# Patient Record
Sex: FEMALE | Race: WHITE | NOT HISPANIC OR LATINO | Employment: OTHER | URBAN - METROPOLITAN AREA
[De-identification: names, ages, dates, MRNs, and addresses within clinical notes are randomized per-mention and may not be internally consistent; named-entity substitution may affect disease eponyms.]

---

## 2017-01-23 ENCOUNTER — APPOINTMENT (OUTPATIENT)
Dept: OPTOMETRY | Facility: CLINIC | Age: 52
End: 2017-01-23
Payer: COMMERCIAL

## 2017-01-23 ENCOUNTER — OFFICE VISIT (OUTPATIENT)
Dept: OPTOMETRY | Facility: CLINIC | Age: 52
End: 2017-01-23
Payer: COMMERCIAL

## 2017-01-23 DIAGNOSIS — H40.003 GLAUCOMA SUSPECT, BILATERAL: ICD-10-CM

## 2017-01-23 DIAGNOSIS — H52.03 HYPEROPIA, BILATERAL: ICD-10-CM

## 2017-01-23 DIAGNOSIS — H52.4 PRESBYOPIA: ICD-10-CM

## 2017-01-23 DIAGNOSIS — H52.203 ASTIGMATISM, BILATERAL: Primary | ICD-10-CM

## 2017-01-23 PROCEDURE — 92014 COMPRE OPH EXAM EST PT 1/>: CPT | Performed by: OPTOMETRIST

## 2017-01-23 PROCEDURE — 92015 DETERMINE REFRACTIVE STATE: CPT | Performed by: OPTOMETRIST

## 2017-01-23 PROCEDURE — 92340 FIT SPECTACLES MONOFOCAL: CPT | Performed by: OPTOMETRIST

## 2017-01-23 ASSESSMENT — REFRACTION_WEARINGRX
OS_CYLINDER: +0.50
OS_AXIS: 175
OS_SPHERE: +2.00
OD_SPHERE: +2.00
OS_SPHERE: PLANO
OD_AXIS: 12
OD_SPHERE: -0.25
SPECS_TYPE: EXAM
OS_ADD: +2.00
OD_CYLINDER: +0.75
OD_ADD: +2.00

## 2017-01-23 ASSESSMENT — VISUAL ACUITY
OD_CC: 20/60
OD_SC: 20/30
OD_SC+: +3
OS_CC: 20/60
METHOD: SNELLEN - LINEAR
OS_SC+: -1
OS_SC: 20/30

## 2017-01-23 ASSESSMENT — TONOMETRY
OS_IOP_MMHG: 14
OD_IOP_MMHG: 14
IOP_METHOD: TONOPEN

## 2017-01-23 ASSESSMENT — EXTERNAL EXAM - RIGHT EYE: OD_EXAM: NORMAL

## 2017-01-23 ASSESSMENT — REFRACTION_MANIFEST
OD_SPHERE: PLANO
OS_AXIS: 172
OD_AXIS: 010
OD_CYLINDER: +0.50
OS_ADD: +2.00
OS_CYLINDER: +0.50
OD_ADD: +2.00
OS_SPHERE: PLANO

## 2017-01-23 ASSESSMENT — SLIT LAMP EXAM - LIDS
COMMENTS: NORMAL
COMMENTS: NORMAL

## 2017-01-23 ASSESSMENT — CUP TO DISC RATIO
OS_RATIO: 0.5
OD_RATIO: 0.7

## 2017-01-23 ASSESSMENT — EXTERNAL EXAM - LEFT EYE: OS_EXAM: NORMAL

## 2017-01-23 ASSESSMENT — CONF VISUAL FIELD
OS_NORMAL: 1
OD_NORMAL: 1

## 2017-01-23 NOTE — PROGRESS NOTES
Chief Complaint   Patient presents with     COMPREHENSIVE EYE EXAM      Accompanied by   Last Eye Exam: 1/13/16  Dilated Previously: Yes    What are you currently using to see?  glasses       Distance Vision Acuity: Noticed gradual change in both eyes    Near Vision Acuity: Not satisfied     Eye Comfort: sometimes dry in winter  Do you use eye drops? : No  Occupation or Hobbies:         Medical, surgical and family histories reviewed and updated 1/23/2017.    Paternal gm- glaucoma     OBJECTIVE: See Ophthalmology exam    ASSESSMENT:    ICD-10-CM    1. Astigmatism, bilateral H52.203 REFRACTION   2. Hyperopia, bilateral H52.03 REFRACTION   3. Presbyopia H52.4 REFRACTION   4. Glaucoma suspect, bilateral H40.003 EYE EXAM (SIMPLE-NONBILLABLE)    Last VF and GDX- 2012    PLAN:     Patient Instructions   Recommend new glasses.    Recommend follow up OCT and visual field at [place].    Return in 1 year for a complete eye exam or sooner if needed.    Eric Chu, OD

## 2017-01-24 NOTE — PATIENT INSTRUCTIONS
Recommend new glasses.    Recommend follow up OCT and visual field at [place].    Return in 1 year for a complete eye exam or sooner if needed.    Eric Chu, OD

## 2017-04-05 ENCOUNTER — OFFICE VISIT (OUTPATIENT)
Dept: FAMILY MEDICINE | Facility: CLINIC | Age: 52
End: 2017-04-05
Payer: COMMERCIAL

## 2017-04-05 VITALS
TEMPERATURE: 97.8 F | DIASTOLIC BLOOD PRESSURE: 84 MMHG | OXYGEN SATURATION: 97 % | HEART RATE: 82 BPM | SYSTOLIC BLOOD PRESSURE: 126 MMHG

## 2017-04-05 DIAGNOSIS — R68.84 JAW PAIN: Primary | ICD-10-CM

## 2017-04-05 DIAGNOSIS — M54.2 NECK PAIN: ICD-10-CM

## 2017-04-05 DIAGNOSIS — F32.9 MAJOR DEPRESSIVE DISORDER, SINGLE EPISODE, UNSPECIFIED: ICD-10-CM

## 2017-04-05 LAB
BASOPHILS # BLD AUTO: 0 10E9/L (ref 0–0.2)
BASOPHILS NFR BLD AUTO: 0.3 %
DIFFERENTIAL METHOD BLD: NORMAL
EOSINOPHIL # BLD AUTO: 0.1 10E9/L (ref 0–0.7)
EOSINOPHIL NFR BLD AUTO: 2.3 %
ERYTHROCYTE [DISTWIDTH] IN BLOOD BY AUTOMATED COUNT: 12.4 % (ref 10–15)
ERYTHROCYTE [SEDIMENTATION RATE] IN BLOOD BY WESTERGREN METHOD: 9 MM/H (ref 0–30)
HCT VFR BLD AUTO: 36.2 % (ref 35–47)
HGB BLD-MCNC: 12 G/DL (ref 11.7–15.7)
LYMPHOCYTES # BLD AUTO: 1.6 10E9/L (ref 0.8–5.3)
LYMPHOCYTES NFR BLD AUTO: 40.6 %
MCH RBC QN AUTO: 29.8 PG (ref 26.5–33)
MCHC RBC AUTO-ENTMCNC: 33.1 G/DL (ref 31.5–36.5)
MCV RBC AUTO: 90 FL (ref 78–100)
MONOCYTES # BLD AUTO: 0.4 10E9/L (ref 0–1.3)
MONOCYTES NFR BLD AUTO: 10.3 %
NEUTROPHILS # BLD AUTO: 1.9 10E9/L (ref 1.6–8.3)
NEUTROPHILS NFR BLD AUTO: 46.5 %
PLATELET # BLD AUTO: 242 10E9/L (ref 150–450)
RBC # BLD AUTO: 4.03 10E12/L (ref 3.8–5.2)
WBC # BLD AUTO: 4 10E9/L (ref 4–11)

## 2017-04-05 PROCEDURE — 85025 COMPLETE CBC W/AUTO DIFF WBC: CPT | Performed by: PHYSICIAN ASSISTANT

## 2017-04-05 PROCEDURE — 99214 OFFICE O/P EST MOD 30 MIN: CPT | Performed by: PHYSICIAN ASSISTANT

## 2017-04-05 PROCEDURE — 85652 RBC SED RATE AUTOMATED: CPT | Performed by: PHYSICIAN ASSISTANT

## 2017-04-05 PROCEDURE — 36415 COLL VENOUS BLD VENIPUNCTURE: CPT | Performed by: PHYSICIAN ASSISTANT

## 2017-04-05 ASSESSMENT — PAIN SCALES - GENERAL: PAINLEVEL: MODERATE PAIN (4)

## 2017-04-05 NOTE — NURSING NOTE
"Chief Complaint   Patient presents with     Jaw Pain       Initial /84 (BP Location: Right arm, Patient Position: Chair, Cuff Size: Adult Large)  Pulse 82  Temp 97.8  F (36.6  C) (Oral)  LMP 02/21/2014  SpO2 97%  Breastfeeding? No Estimated body mass index is 30.02 kg/(m^2) as calculated from the following:    Height as of 10/25/16: 1.645 m (5' 4.75\").    Weight as of 10/25/16: 81.2 kg (179 lb).  Medication Reconciliation: complete     Pt declined Height and weight.    KAYLA Ballard MA      "

## 2017-04-05 NOTE — NURSING NOTE
"Chief Complaint   Patient presents with     Jaw Pain       Initial /84 (BP Location: Right arm, Patient Position: Chair, Cuff Size: Adult Large)  Pulse 82  Temp 97.8  F (36.6  C) (Oral)  LMP 02/21/2014  SpO2 97%  Breastfeeding? No Estimated body mass index is 30.02 kg/(m^2) as calculated from the following:    Height as of 10/25/16: 1.645 m (5' 4.75\").    Weight as of 10/25/16: 81.2 kg (179 lb).  Medication Reconciliation: complete     KAYLA Ballard MA      "

## 2017-04-05 NOTE — PROGRESS NOTES
SUBJECTIVE:                                                    Aishwarya Savage is a 52 year old female who presents to clinic today for the following health issues:      Concern - Jaw Pain- Left side     Onset: 2 months    Description:   Feels impacted/cant sleep/ pain shoots into head    Intensity: mild, moderate    Progression of Symptoms:  worsening and intermittent    Accompanying Signs & Symptoms:  no       Previous history of similar problem:   no    Precipitating factors:   Worsened by: no    Alleviating factors:  Improved by: no       Therapies Tried and outcome: none      Jaw pain right side just inferior to lower jaw.  Taking ibuprofen 3-4 times a day without improvement.  Pain for two months.  Pain is constantly there but sometimes shoots up into top of head.  Ears will flush.  At times radiate into neck where she believes salivary gland is and have sharp pain in both ears.  Will have a hot flash into both ears as well. Adamantly reports not tmj.  Does not grind teeth at night   Not sleeping well at night.  Benadryl without improvement.    Wonders about imaging with MRI or antibiotic.   Admits under a lot of stress and emotional.  Had weaned self down to 10 mg prozac but feels needs to increase dose.   Sometimes will take 1/2 of 's hydrocodone to get to sleep and just needs something to help her relax.    Complains that multiple tendons hurt.  Will wake up in pain.  Naproxen without relief.    Reports ambien made too sedated next day and owns  and cannot be sedated next day    Problem list and histories reviewed & adjusted, as indicated.  Additional history: as documented    Patient Active Problem List   Diagnosis     Hypothyroidism     HSV (herpes simplex virus) infection     Depression     Allergic state     CARDIOVASCULAR SCREENING; LDL GOAL LESS THAN 160     Glaucoma suspect     Symptomatic menopausal or female climacteric states     Myalgia     HARLEY (generalized anxiety disorder)      CHEYANNE (obstructive sleep apnea)- mild (AHI 6)     Fibromyalgia     Hyperlipidemia LDL goal <160     Past Surgical History:   Procedure Laterality Date     CL AFF SURGICAL PATHOLOGY  2001     COLONOSCOPY  2/21/2014    Procedure: COLONOSCOPY;  Colonoscopy, screening;  Surgeon: Micheal Webb MD;  Location: [facility] OR     HC EXCISE HAND/FOOT NEUROMA  2009, 2010    Rt Foot     HC TOOTH EXTRACTION W/FORCEP       TUBAL LIGATION  1/91       Social History   Substance Use Topics     Smoking status: Never Smoker     Smokeless tobacco: Never Used     Alcohol use 1.2 oz/week     2 Standard drinks or equivalent per week      Comment: Socially     Family History   Problem Relation Age of Onset     CANCER Maternal Grandmother      lung     CANCER Mother      lung     Blood Disease Mother      nonhodgkins lymphoma     Thyroid Disease Mother      hypothyroid     DIABETES Mother      Type 2     DIABETES Father      2     Hypertension Father      C.A.D. Father      ? MI at 69     Cardiovascular Paternal Grandfather      AAA     Breast Cancer Other      Glaucoma Paternal Grandmother      DIABETES Sister      diabetic     CEREBROVASCULAR DISEASE Maternal Aunt      Macular Degeneration No family hx of          Current Outpatient Prescriptions   Medication Sig Dispense Refill       1     FLUoxetine (PROZAC) 10 MG capsule Take 3 capsules (30 mg) by mouth daily Take with 20 mg tablet for a total of 30 mg a day 90 capsule 1     Multiple Vitamins-Minerals (MULTIVITAMIN ADULT PO) Take 1 tablet by mouth daily       Calcium Carbonate-Vitamin D (CALCIUM-D PO) Take 1 tablet by mouth 2 times daily       levothyroxine (SYNTHROID, LEVOTHROID) 150 MCG tablet Take 1 tablet (150 mcg) by mouth daily 90 tablet 3     triamcinolone (KENALOG) 0.1 % ointment Apply sparingly to affected area bid for 14 days. 15 g 0     albuterol (PROAIR HFA, PROVENTIL HFA, VENTOLIN HFA) 108 (90 BASE) MCG/ACT inhaler Inhale 2 puffs into the lungs every 6 hours as needed for  shortness of breath / dyspnea or wheezing 1 Inhaler 3     zolpidem (AMBIEN) 5 MG tablet Take tablet by mouth 15 minutes prior to sleep, for Sleep Study 1 tablet 0     nystatin POWD Apply 1 Bottle topically as needed Apply topically, qd-bid, prn 1 each 3     acyclovir (ZOVIRAX) 5 % ointment Apply topically 6 times daily 15 g 1     OMEPRAZOLE PO        fluticasone (FLONASE) 50 MCG/ACT nasal spray Spray 1-2 sprays into both nostrils daily 1 Package 6     ZYRTEC 10 MG OR TABS 1 TABLET DAILY       IBUPROFEN 200 MG OR TABS 1 TABLET EVERY 4 TO 6 HOURS AS NEEDED         Reviewed and updated as needed this visit by clinical staff  Tobacco  Allergies  Meds  Med Hx  Surg Hx  Fam Hx  Soc Hx      Reviewed and updated as needed this visit by Provider         ROS:  Constitutional, HEENT, cardiovascular, pulmonary, gi and gu systems are negative, except as otherwise noted.    OBJECTIVE:                                                    /84 (BP Location: Right arm, Patient Position: Chair, Cuff Size: Adult Large)  Pulse 82  Temp 97.8  F (36.6  C) (Oral)  LMP 02/21/2014  SpO2 97%  Breastfeeding? No  There is no height or weight on file to calculate BMI.  GENERAL: healthy, alert and no distress  EYES: Eyes grossly normal to inspection, PERRL and conjunctivae and sclerae normal  HENT: ear canals and TM's normal, nose and mouth without ulcers or lesions  NECK: no adenopathy, no asymmetry, masses, or scars and thyroid normal to palpation, no carotid bruits   RESP: lungs clear to auscultation - no rales, rhonchi or wheezes  CV: regular rate and rhythm, normal S1 S2, no S3 or S4, no murmur, click or rub, no peripheral edema and peripheral pulses strong  MS: no gross musculoskeletal defects noted, no edema  NEURO: Normal strength and tone, sensory exam grossly normal, mentation intact, cranial nerves 2-12 intact, DTR's normal and symmetric bilaterally , gait normal including heel/toe/tandem walking and rapid alternating  movements normal  PSYCH: mentation appears normal, tearful, anxious, judgement and insight intact and appearance well groomed    Diagnostic Test Results:  Results for orders placed or performed in visit on 04/05/17   CBC with platelets differential   Result Value Ref Range    WBC 4.0 4.0 - 11.0 10e9/L    RBC Count 4.03 3.8 - 5.2 10e12/L    Hemoglobin 12.0 11.7 - 15.7 g/dL    Hematocrit 36.2 35.0 - 47.0 %    MCV 90 78 - 100 fl    MCH 29.8 26.5 - 33.0 pg    MCHC 33.1 31.5 - 36.5 g/dL    RDW 12.4 10.0 - 15.0 %    Platelet Count 242 150 - 450 10e9/L    Diff Method Automated Method     % Neutrophils 46.5 %    % Lymphocytes 40.6 %    % Monocytes 10.3 %    % Eosinophils 2.3 %    % Basophils 0.3 %    Absolute Neutrophil 1.9 1.6 - 8.3 10e9/L    Absolute Lymphocytes 1.6 0.8 - 5.3 10e9/L    Absolute Monocytes 0.4 0.0 - 1.3 10e9/L    Absolute Eosinophils 0.1 0.0 - 0.7 10e9/L    Absolute Basophils 0.0 0.0 - 0.2 10e9/L   ESR: Erythrocyte sedimentation rate   Result Value Ref Range    Sed Rate 9 0 - 30 mm/h        ASSESSMENT/PLAN:                                                            1. Jaw pain  Will image with CT although I do not find any abnormalities on physical exam  Normal cbc and sed rated and advised I did not feel antibiotic warranted  - CBC with platelets differential  - ESR: Erythrocyte sedimentation rate  - CT Soft Tissue Neck w Contrast; Future    2. Neck pain    - CT Soft Tissue Neck w Contrast; Future    3. Major depressive disorder, single episode, unspecified  Long discussion regarding medications to sleep and inappropriate use of medications and medications that I would feel comfortable using for insomnia and in end elected to not treat at this time.  Patient would like to increase prozac from 10 mg daily to 20 mg daily  Recommended follow up with PCP to discuss sleep especially if not improved with increasing prozac (had been on 30 mg but weaned self to 10 mg)   - FLUoxetine (PROZAC) 20 MG capsule; Take 1  capsule (20 mg) by mouth daily  Dispense: 30 capsule; Refill: 1    30 min spent in direct face to face time with this pt, greater than 50% in counseling and coordination of care.      CC chart to PCP for uriel Kay PA-C  [facility]

## 2017-04-05 NOTE — MR AVS SNAPSHOT
After Visit Summary   4/5/2017    Aishwarya Savage    MRN: [id number]           Patient Information     Date Of Birth          1965        Visit Information        Provider Department      4/5/2017 6:20 PM Margo Kay PA-C [facility]        Today's Diagnoses     Jaw pain    -  1    Neck pain        Major depressive disorder, single episode, unspecified           Follow-ups after your visit        Who to contact     If you have questions or need follow up information about today's clinic visit or your schedule please contact [facility] directly at [phone].  Normal or non-critical lab and imaging results will be communicated to you by Acumaticahart, letter or phone within 4 business days after the clinic has received the results. If you do not hear from us within 7 days, please contact the clinic through Acumaticahart or phone. If you have a critical or abnormal lab result, we will notify you by phone as soon as possible.  Submit refill requests through [employer]Rider or call your pharmacy and they will forward the refill request to us. Please allow 3 business days for your refill to be completed.          Additional Information About Your Visit        MyChart Information     [employer] gives you secure access to your electronic health record. If you see a primary care provider, you can also send messages to your care team and make appointments. If you have questions, please call your primary care clinic.  If you do not have a primary care provider, please call [phone] and they will assist you.        Care EveryWhere ID     This is your Care EveryWhere ID. This could be used by other organizations to access your [facility] medical records  [id number]        Your Vitals Were     Pulse Temperature Last Period Pulse Oximetry Breastfeeding?       82 97.8  F (36.6  C) (Oral) 02/21/2014 97% No        Blood Pressure from Last 3 Encounters:   04/05/17 126/84   10/25/16 138/74    09/08/16 121/76    Weight from Last 3 Encounters:   10/25/16 81.2 kg (179 lb)   09/08/16 80.7 kg (178 lb)   05/05/15 84.6 kg (186 lb 9.6 oz)              We Performed the Following     CBC with platelets differential     ESR: Erythrocyte sedimentation rate          Today's Medication Changes          These changes are accurate as of: 4/5/17 11:59 PM.  If you have any questions, ask your nurse or doctor.               These medicines have changed or have updated prescriptions.        Dose/Directions    FLUoxetine 20 MG capsule   Commonly known as:  PROzac   This may have changed:    - medication strength  - how much to take  - additional instructions   Used for:  Major depressive disorder, single episode, unspecified   Changed by:  Margo Kay PA-C        Dose:  20 mg   Take 1 capsule (20 mg) by mouth daily   Quantity:  30 capsule   Refills:  1            Where to get your medicines      These medications were sent to [place] [address] [facility] [place] - [address]  [address] [facility] [address]     Phone:  [phone]     FLUoxetine 20 MG capsule                Primary Care Provider Office Phone # Fax #    Makeda Gabrielle Bryan [phone]       [facility] [address] [facility] [address]        Thank you!     Thank you for choosing [facility]  for your care. Our goal is always to provide you with excellent care. Hearing back from our patients is one way we can continue to improve our services. Please take a few minutes to complete the written survey that you may receive in the mail after your visit with us. Thank you!             Your Updated Medication List - Protect others around you: Learn how to safely use, store and throw away your medicines at www.disposemymeds.org.          This list is accurate as of: 4/5/17 11:59 PM.  Always use your most recent med list.                   Brand Name Dispense Instructions for use    acyclovir  5 % ointment    ZOVIRAX    15 g    Apply topically 6 times daily       albuterol 108 (90 BASE) MCG/ACT Inhaler    PROAIR HFA/PROVENTIL HFA/VENTOLIN HFA    1 Inhaler    Inhale 2 puffs into the lungs every 6 hours as needed for shortness of breath / dyspnea or wheezing       CALCIUM-D PO      Take 1 tablet by mouth 2 times daily       FLUoxetine 20 MG capsule    PROzac    30 capsule    Take 1 capsule (20 mg) by mouth daily       fluticasone 50 MCG/ACT spray    FLONASE    1 Package    Spray 1-2 sprays into both nostrils daily       ibuprofen 200 MG tablet    ADVIL/MOTRIN     1 TABLET EVERY 4 TO 6 HOURS AS NEEDED       levothyroxine 150 MCG tablet    SYNTHROID/LEVOTHROID    90 tablet    Take 1 tablet (150 mcg) by mouth daily       MULTIVITAMIN ADULT PO      Take 1 tablet by mouth daily       nystatin Powd     1 each    Apply 1 Bottle topically as needed Apply topically, qd-bid, prn       OMEPRAZOLE PO          triamcinolone 0.1 % ointment    KENALOG    15 g    Apply sparingly to affected area bid for 14 days.       ZYRTEC 10 MG tablet   Generic drug:  cetirizine      1 TABLET DAILY

## 2017-04-26 ENCOUNTER — RADIANT APPOINTMENT (OUTPATIENT)
Dept: CT IMAGING | Facility: CLINIC | Age: 52
End: 2017-04-26
Attending: PHYSICIAN ASSISTANT
Payer: COMMERCIAL

## 2017-04-26 DIAGNOSIS — R68.84 JAW PAIN: ICD-10-CM

## 2017-04-26 DIAGNOSIS — M54.2 NECK PAIN: ICD-10-CM

## 2017-04-26 PROCEDURE — 70491 CT SOFT TISSUE NECK W/DYE: CPT | Performed by: RADIOLOGY

## 2017-04-26 RX ORDER — IOPAMIDOL 755 MG/ML
100 INJECTION, SOLUTION INTRAVASCULAR ONCE
Status: COMPLETED | OUTPATIENT
Start: 2017-04-26 | End: 2017-04-26

## 2017-04-26 RX ADMIN — IOPAMIDOL 100 ML: 755 INJECTION, SOLUTION INTRAVASCULAR at 16:10

## 2017-04-27 NOTE — PROGRESS NOTES
uriel Neff  Your CT showed some arthritic changes of the TMJ joint but nothing concerning.  If still symptomatic please follow up with your PCP . Please call or MyChart my office with any questions or concerns.    Margo Kay, PAC

## 2017-05-24 DIAGNOSIS — E03.9 HYPOTHYROIDISM, UNSPECIFIED TYPE: ICD-10-CM

## 2017-05-25 RX ORDER — LEVOTHYROXINE SODIUM 150 UG/1
TABLET ORAL
Qty: 90 TABLET | Refills: 2 | OUTPATIENT
Start: 2017-05-25

## 2017-05-25 NOTE — TELEPHONE ENCOUNTER
Medication is being denied due to: RX was sent 10/25/2016 for #90 with 3 refills.  No additional refills should be needed at this time.   Monet Cordova RN

## 2017-11-28 ENCOUNTER — OFFICE VISIT (OUTPATIENT)
Dept: FAMILY MEDICINE | Facility: CLINIC | Age: 52
End: 2017-11-28
Payer: COMMERCIAL

## 2017-11-28 VITALS
BODY MASS INDEX: 30.73 KG/M2 | HEART RATE: 74 BPM | SYSTOLIC BLOOD PRESSURE: 132 MMHG | WEIGHT: 180 LBS | OXYGEN SATURATION: 99 % | HEIGHT: 64 IN | DIASTOLIC BLOOD PRESSURE: 74 MMHG | TEMPERATURE: 97.7 F

## 2017-11-28 DIAGNOSIS — Z00.00 NORMAL PHYSICAL EXAM, ROUTINE: Primary | ICD-10-CM

## 2017-11-28 DIAGNOSIS — Z23 NEED FOR PROPHYLACTIC VACCINATION AND INOCULATION AGAINST INFLUENZA: ICD-10-CM

## 2017-11-28 DIAGNOSIS — F43.23 SITUATIONAL MIXED ANXIETY AND DEPRESSIVE DISORDER: ICD-10-CM

## 2017-11-28 DIAGNOSIS — R05.3 COUGH, PERSISTENT: ICD-10-CM

## 2017-11-28 DIAGNOSIS — F41.1 GAD (GENERALIZED ANXIETY DISORDER): Chronic | ICD-10-CM

## 2017-11-28 DIAGNOSIS — M79.7 FIBROMYALGIA: ICD-10-CM

## 2017-11-28 DIAGNOSIS — E03.9 HYPOTHYROIDISM, UNSPECIFIED TYPE: ICD-10-CM

## 2017-11-28 DIAGNOSIS — Z12.31 VISIT FOR SCREENING MAMMOGRAM: ICD-10-CM

## 2017-11-28 LAB — TSH SERPL DL<=0.005 MIU/L-ACNC: 0.43 MU/L (ref 0.4–4)

## 2017-11-28 PROCEDURE — 90471 IMMUNIZATION ADMIN: CPT | Performed by: FAMILY MEDICINE

## 2017-11-28 PROCEDURE — 84443 ASSAY THYROID STIM HORMONE: CPT | Performed by: FAMILY MEDICINE

## 2017-11-28 PROCEDURE — 90686 IIV4 VACC NO PRSV 0.5 ML IM: CPT | Performed by: FAMILY MEDICINE

## 2017-11-28 PROCEDURE — 36415 COLL VENOUS BLD VENIPUNCTURE: CPT | Performed by: FAMILY MEDICINE

## 2017-11-28 PROCEDURE — 99396 PREV VISIT EST AGE 40-64: CPT | Mod: 25 | Performed by: FAMILY MEDICINE

## 2017-11-28 RX ORDER — ALBUTEROL SULFATE 90 UG/1
2 AEROSOL, METERED RESPIRATORY (INHALATION) EVERY 6 HOURS PRN
Qty: 1 INHALER | Refills: 3 | Status: SHIPPED | OUTPATIENT
Start: 2017-11-28 | End: 2018-10-22

## 2017-11-28 RX ORDER — LEVOTHYROXINE SODIUM 150 UG/1
150 TABLET ORAL DAILY
Qty: 90 TABLET | Refills: 3 | Status: SHIPPED | OUTPATIENT
Start: 2017-11-28 | End: 2018-11-21

## 2017-11-28 ASSESSMENT — PAIN SCALES - GENERAL: PAINLEVEL: NO PAIN (1)

## 2017-11-28 NOTE — LETTER
My Depression Action Plan  Name: Aishwarya Savage   Date of Birth 1965  Date: 11/28/2017    My doctor: Makeda Bryan   My clinic: [address] [id number] [phone]          GREEN    ZONE   Good Control    What it looks like:     Things are going generally well. You have normal up s and down s. You may even feel depressed from time to time, but bad moods usually last less than a day.   What you need to do:  1. Continue to care for yourself (see self care plan)  2. Check your depression survival kit and update it as needed  3. Follow your physician s recommendations including any medication.  4. Do not stop taking medication unless you consult with your physician first.           YELLOW         ZONE Getting Worse    What it looks like:     Depression is starting to interfere with your life.     It may be hard to get out of bed; you may be starting to isolate yourself from others.    Symptoms of depression are starting to last most all day and this has happened for several days.     You may have suicidal thoughts but they are not constant.   What you need to do:     1. Call your care team, your response to treatment will improve if you keep your care team informed of your progress. Yellow periods are signs an adjustment may need to be made.     2. Continue your self-care, even if you have to fake it!    3. Talk to someone in your support network    4. Open up your depression survival kit           RED    ZONE Medical Alert - Get Help    What it looks like:     Depression is seriously interfering with your life.     You may experience these or other symptoms: You can t get out of bed most days, can t work or engage in other necessary activities, you have trouble taking care of basic hygiene, or basic responsibilities, thoughts of suicide or death that will not go away, self-injurious behavior.     What you need to do:  1. Call your  care team and request a same-day appointment. If they are not available (weekends or after hours) call your local crisis line, emergency room or 911.      Electronically signed by: Makeda Bryan, November 28, 2017    Depression Self Care Plan / Survival Kit    Self-Care for Depression  Here s the deal. Your body and mind are really not as separate as most people think.  What you do and think affects how you feel and how you feel influences what you do and think. This means if you do things that people who feel good do, it will help you feel better.  Sometimes this is all it takes.  There is also a place for medication and therapy depending on how severe your depression is, so be sure to consult with your medical provider and/ or Behavioral Health Consultant if your symptoms are worsening or not improving.     In order to better manage my stress, I will:    Exercise  Get some form of exercise, every day. This will help reduce pain and release endorphins, the  feel good  chemicals in your brain. This is almost as good as taking antidepressants!  This is not the same as joining a gym and then never going! (they count on that by the way ) It can be as simple as just going for a walk or doing some gardening, anything that will get you moving.      Hygiene   Maintain good hygiene (Get out of bed in the morning, Make your bed, Brush your teeth, Take a shower, and Get dressed like you were going to work, even if you are unemployed).  If your clothes don't fit try to get ones that do.    Diet  I will strive to eat foods that are good for me, drink plenty of water, and avoid excessive sugar, caffeine, alcohol, and other mood-altering substances.  Some foods that are helpful in depression are: complex carbohydrates, B vitamins, flaxseed, fish or fish oil, fresh fruits and vegetables.    Psychotherapy  I agree to participate in Individual Therapy (if recommended).    Medication  If prescribed medications, I agree to take  them.  Missing doses can result in serious side effects.  I understand that drinking alcohol, or other illicit drug use, may cause potential side effects.  I will not stop my medication abruptly without first discussing it with my provider.    Staying Connected With Others  I will stay in touch with my friends, family members, and my primary care provider/team.    Use your imagination  Be creative.  We all have a creative side; it doesn t matter if it s oil painting, sand castles, or mud pies! This will also kick up the endorphins.    Witness Beauty  (AKA stop and smell the roses) Take a look outside, even in mid-winter. Notice colors, textures. Watch the squirrels and birds.     Service to others  Be of service to others.  There is always someone else in need.  By helping others we can  get out of ourselves  and remember the really important things.  This also provides opportunities for practicing all the other parts of the program.    Humor  Laugh and be silly!  Adjust your TV habits for less news and crime-drama and more comedy.    Control your stress  Try breathing deep, massage therapy, biofeedback, and meditation. Find time to relax each day.     My support system    Clinic Contact:  Phone number:    Contact 1:  Phone number:    Contact 2:  Phone number:    Mormonism/:  Phone number:    Therapist:  Phone number:    Local crisis center:    Phone number:    Other community support:  Phone number:

## 2017-11-28 NOTE — NURSING NOTE
"Chief Complaint   Patient presents with     Physical       Initial /74 (BP Location: Right arm, Patient Position: Chair, Cuff Size: Adult Regular)  Pulse 74  Temp 97.7  F (36.5  C) (Oral)  Ht 5' 4.25\" (1.632 m)  Wt 180 lb (81.6 kg)  LMP 02/21/2014  SpO2 99%  Breastfeeding? No  BMI 30.66 kg/m2 Estimated body mass index is 30.66 kg/(m^2) as calculated from the following:    Height as of this encounter: 5' 4.25\" (1.632 m).    Weight as of this encounter: 180 lb (81.6 kg).  Medication Reconciliation: complete     Gordo Brink CMA    "

## 2017-11-28 NOTE — MR AVS SNAPSHOT
After Visit Summary   11/28/2017    Aishwarya Savage    MRN: [id number]           Patient Information     Date Of Birth          1965        Visit Information        Provider Department      11/28/2017 5:40 PM Makeda Bryan MD [facility]        Today's Diagnoses     Hypothyroidism, unspecified type    -  1    HARLEY (generalized anxiety disorder)        Visit for screening mammogram        Need for prophylactic vaccination and inoculation against influenza        Fibromyalgia        Situational mixed anxiety and depressive disorder          Care Instructions    At [facility], we strive to deliver an exceptional experience to you, every time we see you.  If you receive a survey in the mail, please send us back your thoughts. We really do value your feedback.    Based on your medical history, these are the current health maintenance/preventive care services that you are due for (some may have been done at this visit.)  Health Maintenance Due   Topic Date Due     PHQ-9 Q6 MONTHS  04/10/2017     INFLUENZA VACCINE (SYSTEM ASSIGNED)  09/01/2017     TSH Q1 YEAR  10/25/2017     DEPRESSION ACTION PLAN Q1 YR  10/25/2017     MAMMO SCREEN Q2 YR (SYSTEM ASSIGNED)  11/04/2017         Suggested websites for health information:  Www.[employer] : Up to date and easily searchable information on multiple topics.  Www.medlineplus.gov : medication info, interactive tutorials, watch real surgeries online  Www.familydoctor.org : good info from the Academy of Family Physicians  Www.cdc.gov : public health info, travel advisories, epidemics (H1N1)  Www.aap.org : children's health info, normal development, vaccinations  Www.health.state.[place].us : [place] dept of health, public health issues in [place], N1N1    Your care team:                            Family Medicine Internal Medicine   MD Toby Short MD Shantel Branch-Fleming, MD Katya Georgiev PA-C Nam Ho, MD  Pediatrics   JUAN Gonzalez CNP Amelia Massimini APRMD Coreen Rey CNP, MD Deborah Mielke, MD Kim Thein, APRN CNP      Clinic hours: Monday - Thursday 7 am-7 pm; Fridays 7 am-5 pm.   Urgent care: Monday - Friday 11 am-9 pm; Saturday and Sunday 9 am-5 pm.  Pharmacy : Monday -Thursday 8 am-8 pm; Friday 8 am-6 pm; Saturday and Sunday 9 am-5 pm.     Clinic: [phone]   Pharmacy: [phone]    Preventive Health Recommendations  Female Ages 50 - 64    Yearly exam: See your health care provider every year in order to  o Review health changes.   o Discuss preventive care.    o Review your medicines if your doctor has prescribed any.      Get a Pap test every three years (unless you have an abnormal result and your provider advises testing more often).    If you get Pap tests with HPV test, you only need to test every 5 years, unless you have an abnormal result.     You do not need a Pap test if your uterus was removed (hysterectomy) and you have not had cancer.    You should be tested each year for STDs (sexually transmitted diseases) if you're at risk.     Have a mammogram every 1 to 2 years.    Have a colonoscopy at age 50, or have a yearly FIT test (stool test). These exams screen for colon cancer.      Have a cholesterol test every 5 years, or more often if advised.    Have a diabetes test (fasting glucose) every three years. If you are at risk for diabetes, you should have this test more often.     If you are at risk for osteoporosis (brittle bone disease), think about having a bone density scan (DEXA).    Shots: Get a flu shot each year. Get a tetanus shot every 10 years.    Nutrition:     Eat at least 5 servings of fruits and vegetables each day.    Eat whole-grain bread, whole-wheat pasta and brown rice instead of white grains and rice.    Talk to your provider about Calcium and Vitamin D.     Lifestyle    Exercise at least 150 minutes a week (30 minutes a  day, 5 days a week). This will help you control your weight and prevent disease.    Limit alcohol to one drink per day.    No smoking.     Wear sunscreen to prevent skin cancer.     See your dentist every six months for an exam and cleaning.    See your eye doctor every 1 to 2 years.    Anxiety Reaction  Anxiety is the feeling we all get when we think something bad might happen. It is a normal response to stress and usually causes only a mild reaction. When anxiety becomes more severe, it can interfere with daily life. In some cases, you may not even be aware of what it is you re anxious about. There may also be a genetic link or it may be a learned behavior in the home.  Both psychological and physical triggers cause stress reaction. It's often a response to fear or emotional stress, real or imagined. This stress may come from home, family, work, or social relationships.  During an anxiety reaction, you may feel:    Helpless    Nervous    Depressed    Irritable  Your body may show signs of anxiety in many ways. You may experience:    Dry mouth    Shakiness    Dizziness    Weakness    Trouble breathing    Breathing fast (hyperventilating)    Chest pressure    Sweating    Headache    Nausea    Diarrhea    Tiredness    Inability to sleep    Sexual problems  Home care    Try to locate the sources of stress in your life. They may not be obvious. These may include:    Daily hassles of life (traffic jams, missed appointments, car troubles, etc.)    Major life changes, both good (new baby, job promotion) and bad (loss of job, loss of loved one)    Overload: feeling that you have too many responsibilities and can't take care of all of them at once    Feeling helpless, feeling that your problems are beyond what you re able to solve    Notice how your body reacts to stress. Learn to listen to your body signals. This will help you take action before the stress becomes severe.    When you can, do something about the source of  your stress. (Avoid hassles, limit the amount of change that happens in your life at one time and take a break when you feel overloaded).    Unfortunately, many stressful situations can't be avoided. It is necessary to learn how to better manage stress. There are many proven methods that will reduce your anxiety. These include simple things like exercise, good nutrition and adequate rest. Also, there are certain techniques that are helpful:    Relaxation    Breathing exercises    Visualization    Biofeedback    Meditation  For more information about this, consult your doctor or go to a local bookstore and review the many books and tapes available on this subject.  Follow-up care  If you feel that your anxiety is not responding to self-help measures, contact your doctor or make an appointment with a counselor. You may need short-term psychological counseling and temporary medicine to help you manage stress.  Call 911  Call your healthcare provider right away if any of these occur:    Trouble breathing    Confusion    Drowsiness or trouble wakening    Fainting or loss of consciousness    Rapid heart rate    Seizure    New chest pain that becomes more severe, lasts longer, or spreads into your shoulder, arm, neck, jaw, or back  When to seek medical advice  Call your healthcare provider right away if any of these occur:    Your symptoms get worse    Severe headache not relieved by rest and mild pain reliever  Date Last Reviewed: 9/29/2015 2000-2017 [employer]. [address] [place] [address], [place] [address]. All rights reserved. This information is not intended as a substitute for professional medical care. Always follow your healthcare professional's instructions.                Follow-ups after your visit        Additional Services     MENTAL HEALTH REFERRAL  - Adult; Outpatient Treatment; Individual/Couples/Family/Group Therapy/Health Psychology; [facility] [phone]; The  scheduling team will contact you to schedule your appointment.  If you have any ...       All scheduling is subject to the client's specific insurance plan & benefits, provider/location availability, and provider clinical specialities.  Please arrive 15 minutes early for your first appointment and bring your completed paperwork.    Please be aware that coverage of these services is subject to the terms and limitations of your health insurance plan.  Call member services at your health plan with any benefit or coverage questions.                            Future tests that were ordered for you today     Open Future Orders        Priority Expected Expires Ordered    MA SCREENING DIGITAL BILAT - Future  (s+30) Routine  11/28/2018 11/28/2017            Who to contact     If you have questions or need follow up information about today's clinic visit or your schedule please contact [facility] directly at [phone].  Normal or non-critical lab and imaging results will be communicated to you by CoTweethart, letter or phone within 4 business days after the clinic has received the results. If you do not hear from us within 7 days, please contact the clinic through CoTwe[employer]art or phone. If you have a critical or abnormal lab result, we will notify you by phone as soon as possible.  Submit refill requests through [employer]ter or call your pharmacy and they will forward the refill request to us. Please allow 3 business days for your refill to be completed.          Additional Information About Your Visit        D[employer]ter Information     [employer] gives you secure access to your electronic health record. If you see a primary care provider, you can also send messages to your care team and make appointments. If you have questions, please call your primary care clinic.  If you do not have a primary care provider, please call [phone] and they will assist you.        Care EveryWhere ID     This is your Care EveryWhere ID.  "This could be used by other organizations to access your [place] medical records  [id number]        Your Vitals Were     Pulse Temperature Height Last Period Pulse Oximetry Breastfeeding?    74 97.7  F (36.5  C) (Oral) 5' 4.25\" (1.632 m) 02/21/2014 99% No    BMI (Body Mass Index)                   30.66 kg/m2            Blood Pressure from Last 3 Encounters:   11/28/17 132/74   04/05/17 126/84   10/25/16 138/74    Weight from Last 3 Encounters:   11/28/17 180 lb (81.6 kg)   10/25/16 179 lb (81.2 kg)   09/08/16 178 lb (80.7 kg)              We Performed the Following          ADMIN VACCINE, FIRST [12392]     HC FLU VAC PRESRV FREE QUAD SPLIT VIR 3+YRS IM  [21288]     MENTAL HEALTH REFERRAL  - Adult; Outpatient Treatment; Individual/Couples/Family/Group Therapy/Health Psychology; [facility] [phone]; The scheduling team will contact you to schedule your appointment.  If you have any ...     TSH WITH FREE T4 REFLEX          Today's Medication Changes          These changes are accurate as of: 11/28/17  6:19 PM.  If you have any questions, ask your nurse or doctor.               Stop taking these medicines if you haven't already. Please contact your care team if you have questions.     acyclovir 5 % ointment   Commonly known as:  ZOVIRAX   Stopped by:  Makeda Bryan MD           fluticasone 50 MCG/ACT spray   Commonly known as:  FLONASE   Stopped by:  Makeda Bryan MD           nystatin Powd   Stopped by:  Makeda Bryan MD                Where to get your medicines      These medications were sent to [place] [address] [facility] [place] [place] - [address]  [address] [address]     Phone:  [phone]     FLUoxetine 20 MG capsule    levothyroxine 150 MCG tablet                Primary Care Provider Office Phone # Fax #    Makeda Bryan [phone]       [address]  [place] [address]        Equal Access to Services     " [facility] : Hadii aad ku ritika Mera, waaxda luqadaha, qaybta kaalmada adeezio, neeta sandie juanann alvarengaharveydurga vela . So [facility] [phone].    ATENCIÓN: Si habla español, tiene a benton disposición servicios gratuitos de asistencia lingüística. Llame al [phone].    We comply with applicable federal civil rights laws and [place] laws. We do not discriminate on the basis of race, color, national origin, age, disability, sex, sexual orientation, or gender identity.            Thank you!     Thank you for choosing [facility]  for your care. Our goal is always to provide you with excellent care. Hearing back from our patients is one way we can continue to improve our services. Please take a few minutes to complete the written survey that you may receive in the mail after your visit with us. Thank you!             Your Updated Medication List - Protect others around you: Learn how to safely use, store and throw away your medicines at www.disposemymeds.org.          This list is accurate as of: 11/28/17  6:19 PM.  Always use your most recent med list.                   Brand Name Dispense Instructions for use Diagnosis    albuterol 108 (90 BASE) MCG/ACT Inhaler    PROAIR HFA/PROVENTIL HFA/VENTOLIN HFA    1 Inhaler    Inhale 2 puffs into the lungs every 6 hours as needed for shortness of breath / dyspnea or wheezing    Cough, persistent       FLUoxetine 20 MG capsule    PROzac    90 capsule    Take 1 capsule (20 mg) by mouth daily    HARLEY (generalized anxiety disorder)       ibuprofen 200 MG tablet    ADVIL/MOTRIN     1 TABLET EVERY 4 TO 6 HOURS AS NEEDED        levothyroxine 150 MCG tablet    SYNTHROID/LEVOTHROID    90 tablet    Take 1 tablet (150 mcg) by mouth daily    Hypothyroidism, unspecified type       MULTIVITAMIN ADULT PO      Take 1 tablet by mouth daily        ZYRTEC 10 MG tablet   Generic drug:  cetirizine      1 TABLET DAILY

## 2017-11-28 NOTE — PROGRESS NOTES
SUBJECTIVE:   CC: Aishwarya Savage is an 52 year old woman who presents for preventive health visit.     Physical   Annual:     Getting at least 3 servings of Calcium per day::  Yes    Bi-annual eye exam::  Yes    Dental care twice a year::  Yes    Sleep apnea or symptoms of sleep apnea::  None (Hx sleep study negative)    Diet::  Regular (no restrictions)    Frequency of exercise::  None    Taking medications regularly::  Yes    Additional concerns today::  YES (trouble falling alseep and cold/strep throat exosure with feeling sick x4days)          Depression and Anxiety Follow-Up    Status since last visit: No change    Other associated symptoms:None    Complicating factors:     Significant life event: Yes-  Children have grown up and left home,  at home on disability, home  and has problems with increased stress.      Current substance abuse: None    PHQ-9 Score and MyChart F/U Questions 10/28/2015 10/11/2016 11/30/2017   Total Score 9 11 6   Q9: Suicide Ideation Not at all Several days Not at all     HARLEY-7 SCORE 10/28/2015 10/11/2016   Total Score 3 2       PHQ-9  English  PHQ-9   Any Language  GAD7  Suicide Assessment Five-step Evaluation and Treatment (SAFE-T)  Hypothyroidism Follow-up      Since last visit, patient describes the following symptoms: Weight stable, no hair loss, no skin changes, no constipation, no loose stools        Today's PHQ-2 Score:   PHQ-2 ( 1999 Pfizer) 11/28/2017   Q1: Little interest or pleasure in doing things 1   Q2: Feeling down, depressed or hopeless 0   PHQ-2 Score 1       Abuse: Current or Past(Physical, Sexual or Emotional)- No  Do you feel safe in your environment - Yes    Social History   Substance Use Topics     Smoking status: Never Smoker     Smokeless tobacco: Never Used     Alcohol use 1.2 oz/week     2 Standard drinks or equivalent per week      Comment: Socially     The patient does not drink >3 drinks per day nor >7 drinks per week.    Reviewed orders  with patient.  Reviewed health maintenance and updated orders accordingly - Yes  Labs reviewed in EPIC  BP Readings from Last 3 Encounters:   11/28/17 132/74   04/05/17 126/84   10/25/16 138/74    Wt Readings from Last 3 Encounters:   11/28/17 180 lb (81.6 kg)   10/25/16 179 lb (81.2 kg)   09/08/16 178 lb (80.7 kg)                  Patient Active Problem List   Diagnosis     Hypothyroidism     HSV (herpes simplex virus) infection     Depression     CARDIOVASCULAR SCREENING; LDL GOAL LESS THAN 160     Glaucoma suspect     HARLEY (generalized anxiety disorder)     CHEYANNE (obstructive sleep apnea)- mild (AHI 6)     Fibromyalgia     Hyperlipidemia LDL goal <160     Situational mixed anxiety and depressive disorder     Past Surgical History:   Procedure Laterality Date     CL AFF SURGICAL PATHOLOGY  2001     COLONOSCOPY  2/21/2014    Procedure: COLONOSCOPY;  Colonoscopy, screening;  Surgeon: Micheal Webb MD;  Location: [facility] OR     HC EXCISE HAND/FOOT NEUROMA  2009, 2010    Rt Foot     HC TOOTH EXTRACTION W/FORCEP       TUBAL LIGATION  1/91       Social History   Substance Use Topics     Smoking status: Never Smoker     Smokeless tobacco: Never Used     Alcohol use 1.2 oz/week     2 Standard drinks or equivalent per week      Comment: Socially     Family History   Problem Relation Age of Onset     CANCER Maternal Grandmother      lung     CANCER Mother      lung     Blood Disease Mother      nonhodgkins lymphoma     Thyroid Disease Mother      hypothyroid     DIABETES Mother      Type 2     DIABETES Father      2     Hypertension Father      C.A.D. Father      ? MI at 69     Cardiovascular Paternal Grandfather      AAA     Breast Cancer Other      Glaucoma Paternal Grandmother      DIABETES Sister      diabetic     CEREBROVASCULAR DISEASE Maternal Aunt      Macular Degeneration No family hx of          Current Outpatient Prescriptions   Medication Sig Dispense Refill     FLUoxetine (PROZAC) 20 MG capsule Take 1 capsule (20 mg)  by mouth daily 90 capsule 3     levothyroxine (SYNTHROID/LEVOTHROID) 150 MCG tablet Take 1 tablet (150 mcg) by mouth daily 90 tablet 3     albuterol (PROAIR HFA/PROVENTIL HFA/VENTOLIN HFA) 108 (90 BASE) MCG/ACT Inhaler Inhale 2 puffs into the lungs every 6 hours as needed for shortness of breath / dyspnea or wheezing 1 Inhaler 3     Multiple Vitamins-Minerals (MULTIVITAMIN ADULT PO) Take 1 tablet by mouth daily       ZYRTEC 10 MG OR TABS 1 TABLET DAILY       IBUPROFEN 200 MG OR TABS 1 TABLET EVERY 4 TO 6 HOURS AS NEEDED       Allergies   Allergen Reactions     Clindamycin Swelling     face     Sulfa Drugs Hives     Recent Labs   Lab Test  11/28/17   1821  10/25/16   1834  10/28/15   1554   10/28/14   1925   11/21/11   1842   LDL   --   102*   --    --    --    --   97   HDL   --   53   --    --    --    --   55   TRIG   --    --    --    --    --    --   82   ALT   --    --   31   --   34   --    --    CR   --    --   0.73   --   0.88   < >   --    GFRESTIMATED   --    --   84   --   68   < >   --    GFRESTBLACK   --    --   >90   GFR Calc     --   82   < >   --    POTASSIUM   --    --   3.7   --   4.3   < >   --    TSH  0.43  0.45  0.51   < >   --    < >  0.60    < > = values in this interval not displayed.              Patient over age 50, mutual decision to screen reflected in health maintenance.      Pertinent mammograms are reviewed under the imaging tab.  History of abnormal Pap smear: NO - age 30-65 PAP every 5 years with negative HPV co-testing recommended    Reviewed and updated as needed this visit by clinical staffTobacco  Allergies  Meds         Reviewed and updated as needed this visit by Provider            Review of Systems  C: NEGATIVE for fever, chills, change in weight  I: NEGATIVE for worrisome rashes, moles or lesions  E: NEGATIVE for vision changes or irritation  ENT: NEGATIVE for ear, mouth and throat problems  R: NEGATIVE for significant cough or SOB  B: NEGATIVE for masses,  "tenderness or discharge  CV: NEGATIVE for chest pain, palpitations or peripheral edema  GI: NEGATIVE for nausea, abdominal pain, heartburn, or change in bowel habits  : NEGATIVE for unusual urinary or vaginal symptoms. No vaginal bleeding.  M: NEGATIVE for significant arthralgias or myalgia  N: NEGATIVE for weakness, dizziness or paresthesias  P: NEGATIVE for changes in mood or affect      OBJECTIVE:   /74 (BP Location: Right arm, Patient Position: Chair, Cuff Size: Adult Regular)  Pulse 74  Temp 97.7  F (36.5  C) (Oral)  Ht 5' 4.25\" (1.632 m)  Wt 180 lb (81.6 kg)  LMP 02/21/2014  SpO2 99%  Breastfeeding? No  BMI 30.66 kg/m2  Physical Exam  GENERAL APPEARANCE: healthy, alert and no distress  EYES: Eyes grossly normal to inspection, PERRL and conjunctivae and sclerae normal  HENT: ear canals and TM's normal, nose and mouth without ulcers or lesions, oropharynx clear and oral mucous membranes moist  NECK: no adenopathy, no asymmetry, masses, or scars and thyroid normal to palpation  RESP: lungs clear to auscultation - no rales, rhonchi or wheezes  CV: regular rates and rhythm, normal S1 S2, no S3 or S4, no murmur, click or rub, no peripheral edema and peripheral pulses strong  ABDOMEN: soft, nontender, no hepatosplenomegaly, no masses and bowel sounds normal  MS: no musculoskeletal defects are noted and gait is age appropriate without ataxia  SKIN: no suspicious lesions or rashes  NEURO: Normal strength and tone, sensory exam grossly normal, mentation intact and speech normal  PSYCH: mentation appears normal and affect normal/bright but anxious    ASSESSMENT/PLAN:       ICD-10-CM    1. Hypothyroidism, unspecified type E03.9 TSH WITH FREE T4 REFLEX     levothyroxine (SYNTHROID/LEVOTHROID) 150 MCG tablet   2. HARLEY (generalized anxiety disorder) F41.1 FLUoxetine (PROZAC) 20 MG capsule   3. Visit for screening mammogram Z12.31 MA SCREENING DIGITAL BILAT - Future  (s+30)   4. Need for prophylactic " "vaccination and inoculation against influenza Z23 HC FLU VAC PRESRV FREE QUAD SPLIT VIR 3+YRS IM  [49704]          ADMIN VACCINE, FIRST [36833]   5. Fibromyalgia M79.7 May be due to chronic stress   6. Situational mixed anxiety and depressive disorder- patient says that she is unlikely to attend counseling due to time constraints.  F43.23 MENTAL HEALTH REFERRAL  - Adult; Outpatient Treatment; Individual/Couples/Family/Group Therapy/Health Psychology; [facility]: [facility] [phone]; The scheduling team will contact you to schedule your appointment.  If you have any ...   7. Cough, persistent R05 albuterol (PROAIR HFA/PROVENTIL HFA/VENTOLIN HFA) 108 (90 BASE) MCG/ACT Inhaler       COUNSELING:  Reviewed preventive health counseling, as reflected in patient instructions       Regular exercise       Healthy diet/nutrition       Osteoporosis Prevention/Bone Health         reports that she has never smoked. She has never used smokeless tobacco.    Estimated body mass index is 30.66 kg/(m^2) as calculated from the following:    Height as of this encounter: 5' 4.25\" (1.632 m).    Weight as of this encounter: 180 lb (81.6 kg).   Weight management plan: Discussed healthy diet and exercise guidelines and patient will follow up in 6 months in clinic to re-evaluate.    Counseling Resources:  ATP IV Guidelines  Pooled Cohorts Equation Calculator  Breast Cancer Risk Calculator  FRAX Risk Assessment  ICSI Preventive Guidelines  Dietary Guidelines for Americans, 2010  USDA's MyPlate  ASA Prophylaxis  Lung CA Screening    Makeda Bryan MD  [facility]  "

## 2017-11-28 NOTE — PATIENT INSTRUCTIONS
At [facility], we strive to deliver an exceptional experience to you, every time we see you.  If you receive a survey in the mail, please send us back your thoughts. We really do value your feedback.    Based on your medical history, these are the current health maintenance/preventive care services that you are due for (some may have been done at this visit.)  Health Maintenance Due   Topic Date Due     PHQ-9 Q6 MONTHS  04/10/2017     INFLUENZA VACCINE (SYSTEM ASSIGNED)  09/01/2017     TSH Q1 YEAR  10/25/2017     DEPRESSION ACTION PLAN Q1 YR  10/25/2017     MAMMO SCREEN Q2 YR (SYSTEM ASSIGNED)  11/04/2017         Suggested websites for health information:  Www.MedArkive.[employer] : Up to date and easily searchable information on multiple topics.  Www.Accessbio.gov : medication info, interactive tutorials, watch real surgeries online  Www.familydoctor.org : good info from the Academy of Family Physicians  Www.cdc.gov : public health info, travel advisories, epidemics (H1N1)  Www.aap.org : children's health info, normal development, vaccinations  Www.health.[facility].[place].us : [place] dept of health, public health issues in [place], N1N1    Your care team:                            Family Medicine Internal Medicine   MD Toby Short MD Shantel Branch-Fleming, MD Katya Georgiev PA-C Nam Ho, MD Pediatrics   JUAN Gonzalez, MD Coreen Landry CNP, MD Deborah Mielke, MD Kim Thein, APRN [facility]      Clinic hours: Monday - Thursday 7 am-7 pm; Fridays 7 am-5 pm.   Urgent care: Monday - Friday 11 am-9 pm; Saturday and Sunday 9 am-5 pm.  Pharmacy : Monday -Thursday 8 am-8 pm; Friday 8 am-6 pm; Saturday and Sunday 9 am-5 pm.     Clinic: [phone]   Pharmacy: [phone]    Preventive Health Recommendations  Female Ages 50 - 64    Yearly exam: See your health care provider every year in order to  o Review health changes.   o Discuss  preventive care.    o Review your medicines if your doctor has prescribed any.      Get a Pap test every three years (unless you have an abnormal result and your provider advises testing more often).    If you get Pap tests with HPV test, you only need to test every 5 years, unless you have an abnormal result.     You do not need a Pap test if your uterus was removed (hysterectomy) and you have not had cancer.    You should be tested each year for STDs (sexually transmitted diseases) if you're at risk.     Have a mammogram every 1 to 2 years.    Have a colonoscopy at age 50, or have a yearly FIT test (stool test). These exams screen for colon cancer.      Have a cholesterol test every 5 years, or more often if advised.    Have a diabetes test (fasting glucose) every three years. If you are at risk for diabetes, you should have this test more often.     If you are at risk for osteoporosis (brittle bone disease), think about having a bone density scan (DEXA).    Shots: Get a flu shot each year. Get a tetanus shot every 10 years.    Nutrition:     Eat at least 5 servings of fruits and vegetables each day.    Eat whole-grain bread, whole-wheat pasta and brown rice instead of white grains and rice.    Talk to your provider about Calcium and Vitamin D.     Lifestyle    Exercise at least 150 minutes a week (30 minutes a day, 5 days a week). This will help you control your weight and prevent disease.    Limit alcohol to one drink per day.    No smoking.     Wear sunscreen to prevent skin cancer.     See your dentist every six months for an exam and cleaning.    See your eye doctor every 1 to 2 years.    Anxiety Reaction  Anxiety is the feeling we all get when we think something bad might happen. It is a normal response to stress and usually causes only a mild reaction. When anxiety becomes more severe, it can interfere with daily life. In some cases, you may not even be aware of what it is you re anxious about. There may  also be a genetic link or it may be a learned behavior in the home.  Both psychological and physical triggers cause stress reaction. It's often a response to fear or emotional stress, real or imagined. This stress may come from home, family, work, or social relationships.  During an anxiety reaction, you may feel:    Helpless    Nervous    Depressed    Irritable  Your body may show signs of anxiety in many ways. You may experience:    Dry mouth    Shakiness    Dizziness    Weakness    Trouble breathing    Breathing fast (hyperventilating)    Chest pressure    Sweating    Headache    Nausea    Diarrhea    Tiredness    Inability to sleep    Sexual problems  Home care    Try to locate the sources of stress in your life. They may not be obvious. These may include:    Daily hassles of life (traffic jams, missed appointments, car troubles, etc.)    Major life changes, both good (new baby, job promotion) and bad (loss of job, loss of loved one)    Overload: feeling that you have too many responsibilities and can't take care of all of them at once    Feeling helpless, feeling that your problems are beyond what you re able to solve    Notice how your body reacts to stress. Learn to listen to your body signals. This will help you take action before the stress becomes severe.    When you can, do something about the source of your stress. (Avoid hassles, limit the amount of change that happens in your life at one time and take a break when you feel overloaded).    Unfortunately, many stressful situations can't be avoided. It is necessary to learn how to better manage stress. There are many proven methods that will reduce your anxiety. These include simple things like exercise, good nutrition and adequate rest. Also, there are certain techniques that are helpful:    Relaxation    Breathing exercises    Visualization    Biofeedback    Meditation  For more information about this, consult your doctor or go to a local bookstore and  review the many books and tapes available on this subject.  Follow-up care  If you feel that your anxiety is not responding to self-help measures, contact your doctor or make an appointment with a counselor. You may need short-term psychological counseling and temporary medicine to help you manage stress.  Call 911  Call your healthcare provider right away if any of these occur:    Trouble breathing    Confusion    Drowsiness or trouble wakening    Fainting or loss of consciousness    Rapid heart rate    Seizure    New chest pain that becomes more severe, lasts longer, or spreads into your shoulder, arm, neck, jaw, or back  When to seek medical advice  Call your healthcare provider right away if any of these occur:    Your symptoms get worse    Severe headache not relieved by rest and mild pain reliever  Date Last Reviewed: 9/29/2015 2000-2017 [employer]. [address] [place] [address], [place] [address]. All rights reserved. This information is not intended as a substitute for professional medical care. Always follow your healthcare professional's instructions.

## 2017-11-30 ASSESSMENT — ANXIETY QUESTIONNAIRES
5. BEING SO RESTLESS THAT IT IS HARD TO SIT STILL: NOT AT ALL
3. WORRYING TOO MUCH ABOUT DIFFERENT THINGS: NOT AT ALL
7. FEELING AFRAID AS IF SOMETHING AWFUL MIGHT HAPPEN: NOT AT ALL
2. NOT BEING ABLE TO STOP OR CONTROL WORRYING: NOT AT ALL
IF YOU CHECKED OFF ANY PROBLEMS ON THIS QUESTIONNAIRE, HOW DIFFICULT HAVE THESE PROBLEMS MADE IT FOR YOU TO DO YOUR WORK, TAKE CARE OF THINGS AT HOME, OR GET ALONG WITH OTHER PEOPLE: NOT DIFFICULT AT ALL
GAD7 TOTAL SCORE: 1
6. BECOMING EASILY ANNOYED OR IRRITABLE: NOT AT ALL
1. FEELING NERVOUS, ANXIOUS, OR ON EDGE: SEVERAL DAYS

## 2017-11-30 ASSESSMENT — PATIENT HEALTH QUESTIONNAIRE - PHQ9
SUM OF ALL RESPONSES TO PHQ QUESTIONS 1-9: 6
5. POOR APPETITE OR OVEREATING: NOT AT ALL

## 2017-11-30 NOTE — PROGRESS NOTES
Dear Aishwarya    Your test results are attached. I am happy to let you know that they are stable.    The thyroid test is normal.     Please contact me by MyChart if you have any questions about your labs or management.    Makeda Bryan MD

## 2017-12-01 ASSESSMENT — ANXIETY QUESTIONNAIRES: GAD7 TOTAL SCORE: 1

## 2017-12-16 ENCOUNTER — RADIANT APPOINTMENT (OUTPATIENT)
Dept: MAMMOGRAPHY | Facility: CLINIC | Age: 52
End: 2017-12-16
Attending: FAMILY MEDICINE
Payer: COMMERCIAL

## 2017-12-16 DIAGNOSIS — Z12.31 VISIT FOR SCREENING MAMMOGRAM: ICD-10-CM

## 2017-12-16 PROCEDURE — G0202 SCR MAMMO BI INCL CAD: HCPCS | Mod: TC

## 2017-12-16 NOTE — LETTER
December 19, 2017      Aishwarya Savage  [address] [place] [address]        Dear ,    We are writing to inform you of your test results.    I am happy to let you know that your mammogram was normal. You may schedule a follow up mammogram in 1-2 years depending on your risk factors and family history. Please let me know if you have any questions about your results. You should be receiving a letter from the radiologist.     Resulted Orders   MA SCREENING DIGITAL BILAT - Future  (s+30)    Narrative    SCREENING MAMMOGRAM, BILATERAL, DIGITAL w/CAD - 12/16/2017 9:39 AM.    BREAST SYMPTOMS: No current breast complaints.     COMPARISON:  11/04/2015, 01/05/2013, 11/26/2011, 03/25/2010.    BREAST DENSITY: Scattered fibroglandular densities.    COMMENTS: No findings of suspicion for malignancy.       Impression    IMPRESSION: BI-RADS CATEGORY: 1 - Negative.    RECOMMENDED FOLLOW-UP: Annual Mammography.  Recommend routine annual screening mammography.    Exam results letter mailed to patient.                TWAN GARCÍA MD           If you have any questions or concerns, please call the clinic at the number listed above.       Sincerely,    Dr. Bryan

## 2017-12-19 NOTE — PROGRESS NOTES
Dear Aishwarya Savage,    I am happy to let you know that your mammogram was normal. You may schedule a follow up mammogram in 1-2 years depending on your risk factors and family history. Please let me know if you have any questions about your results. You should be receiving a letter from the radiologist.    Makeda Bryan MD

## 2018-04-04 ENCOUNTER — OFFICE VISIT (OUTPATIENT)
Dept: OPTOMETRY | Facility: CLINIC | Age: 53
End: 2018-04-04
Payer: COMMERCIAL

## 2018-04-04 ENCOUNTER — APPOINTMENT (OUTPATIENT)
Dept: OPTOMETRY | Facility: CLINIC | Age: 53
End: 2018-04-04
Payer: COMMERCIAL

## 2018-04-04 DIAGNOSIS — H52.4 HYPEROPIA OF BOTH EYES WITH ASTIGMATISM AND PRESBYOPIA: Primary | ICD-10-CM

## 2018-04-04 DIAGNOSIS — H52.203 HYPEROPIA OF BOTH EYES WITH ASTIGMATISM AND PRESBYOPIA: Primary | ICD-10-CM

## 2018-04-04 DIAGNOSIS — H40.003 GLAUCOMA SUSPECT OF BOTH EYES: ICD-10-CM

## 2018-04-04 DIAGNOSIS — H52.03 HYPEROPIA OF BOTH EYES WITH ASTIGMATISM AND PRESBYOPIA: Primary | ICD-10-CM

## 2018-04-04 PROCEDURE — 92340 FIT SPECTACLES MONOFOCAL: CPT | Performed by: OPTOMETRIST

## 2018-04-04 PROCEDURE — 92015 DETERMINE REFRACTIVE STATE: CPT | Performed by: OPTOMETRIST

## 2018-04-04 PROCEDURE — 92014 COMPRE OPH EXAM EST PT 1/>: CPT | Performed by: OPTOMETRIST

## 2018-04-04 ASSESSMENT — SLIT LAMP EXAM - LIDS
COMMENTS: MEIBOMIAN GLAND DYSFUNCTION
COMMENTS: MEIBOMIAN GLAND DYSFUNCTION

## 2018-04-04 ASSESSMENT — REFRACTION_MANIFEST
OS_SPHERE: +0.50
OD_AXIS: 010
OS_SPHERE: +0.25
OD_SPHERE: +0.25
OD_SPHERE: PLANO
OS_AXIS: 166
OS_CYLINDER: +0.50
OD_CYLINDER: +0.75
OS_AXIS: 170
OS_CYLINDER: +0.50
OD_CYLINDER: +0.50
OD_AXIS: 012
METHOD_AUTOREFRACTION: 1

## 2018-04-04 ASSESSMENT — TONOMETRY
OD_IOP_MMHG: 19
IOP_METHOD: APPLANATION
OS_IOP_MMHG: 18

## 2018-04-04 ASSESSMENT — CUP TO DISC RATIO
OD_RATIO: 0.7
OS_RATIO: 0.5

## 2018-04-04 ASSESSMENT — VISUAL ACUITY
OD_SC+: -2
OD_CC: 20/60
CORRECTION_TYPE: GLASSES
OS_CC: 20/60
METHOD: SNELLEN - LINEAR
OS_SC: 20/30
OD_SC: 20/30

## 2018-04-04 ASSESSMENT — REFRACTION_WEARINGRX
OS_ADD: +2.00
OS_AXIS: 172
OS_CYLINDER: +0.50
OS_SPHERE: PLANO
OD_ADD: +2.00
OD_AXIS: 010
OD_CYLINDER: +0.50
OD_SPHERE: PLANO

## 2018-04-04 ASSESSMENT — CONF VISUAL FIELD
METHOD: COUNTING FINGERS
OD_NORMAL: 1
OS_NORMAL: 1

## 2018-04-04 ASSESSMENT — EXTERNAL EXAM - LEFT EYE: OS_EXAM: NORMAL

## 2018-04-04 ASSESSMENT — EXTERNAL EXAM - RIGHT EYE: OD_EXAM: NORMAL

## 2018-04-04 NOTE — PROGRESS NOTES
Chief Complaint   Patient presents with     COMPREHENSIVE EYE EXAM      Accompanied by  who also has an appointment  Last Eye Exam: 2017  Dilated Previously: Yes    What are you currently using to see?  Glasses for distance - dog ate  - using readers - may be interested in a bifocal       Distance Vision Acuity: Noticed gradual change in both eyes    Near Vision Acuity: Not satisfied     Eye Comfort: dry  Do you use eye drops? : No  Occupation or Hobbies:  provider    Keira Davidson  Optometric Tech            Medical, surgical and family histories reviewed and updated 4/4/2018.       OBJECTIVE: See Ophthalmology exam    ASSESSMENT:    ICD-10-CM    1. Hyperopia of both eyes with astigmatism and presbyopia H52.03 EYE EXAM (SIMPLE-NONBILLABLE)    H52.203 REFRACTION    H52.4    2. Glaucoma suspect of both eyes H40.003 EYE EXAM (SIMPLE-NONBILLABLE)     OPHTHALMOLOGY ADULT REFERRAL      PLAN:     Patient Instructions   It would be a good idea to repeat the glaucoma testing again. Referral card given. Aishwarya will check insurance coverage and call to make an appointment.    Your eyes may be blurry at near and sensitive to light for several hours from the dilating drops.    Yearly eye exams recommended.    Thank you!

## 2018-04-04 NOTE — MR AVS SNAPSHOT
After Visit Summary   4/4/2018    Aishwarya Savage    MRN: [id number]           Patient Information     Date Of Birth          1965        Visit Information        Provider Department      4/4/2018 5:40 PM Giulia Bah OD [facility]        Today's Diagnoses     Hyperopia of both eyes with astigmatism and presbyopia    -  1    Glaucoma suspect of both eyes          Care Instructions    It would be a good idea to repeat the glaucoma testing again. Referral card given. Aishwarya will check insurance coverage and call to make an appointment.    Your eyes may be blurry at near and sensitive to light for several hours from the dilating drops.    Yearly eye exams recommended.    Thank you!            Follow-ups after your visit        Additional Services     OPHTHALMOLOGY ADULT REFERRAL       Your provider has referred you to:  [facility] [phone]   [email or web address]      Please be aware that coverage of these services is subject to the terms and limitations of your health insurance plan.  Call member services at your health plan with any benefit or coverage questions.      Please bring the following to your appointment:  >>   Any x-rays, CTs or MRIs which have been performed.  Contact the facility where they were done to arrange for  prior to your scheduled appointment.  Any new CT, MRI or other procedures ordered by your specialist must be performed at a [facility] facility or coordinated by your clinic's referral office.    >>   List of current medications   >>   This referral request   >>   Any documents/labs given to you for this referral                  Who to contact     If you have questions or need follow up information about today's clinic visit or your schedule please contact [facility] directly at [phone].  Normal or non-critical lab and imaging results will be communicated to you by  MyChart, letter or phone within 4 business days after the clinic has received the results. If you do not hear from us within 7 days, please contact the clinic through Lottayt or phone. If you have a critical or abnormal lab result, we will notify you by phone as soon as possible.  Submit refill requests through [employer] or call your pharmacy and they will forward the refill request to us. Please allow 3 business days for your refill to be completed.          Additional Information About Your Visit        [employer] Information     [employer] gives you secure access to your electronic health record. If you see a primary care provider, you can also send messages to your care team and make appointments. If you have questions, please call your primary care clinic.  If you do not have a primary care provider, please call [phone] and they will assist you.        Care EveryWhere ID     This is your Care EveryWhere ID. This could be used by other organizations to access your [facility] medical records  [id number]        Your Vitals Were     Last Period                   02/24/2014            Blood Pressure from Last 3 Encounters:   11/28/17 132/74   04/05/17 126/84   10/25/16 138/74    Weight from Last 3 Encounters:   11/28/17 81.6 kg (180 lb)   10/25/16 81.2 kg (179 lb)   09/08/16 80.7 kg (178 lb)              We Performed the Following     EYE EXAM (SIMPLE-NONBILLABLE)     OPHTHALMOLOGY ADULT REFERRAL     REFRACTION        Primary Care Provider Office Phone # Fax #    Makeda Gabrielle Bryan [phone]       [address]  [facility] [address]        Equal Access to Services     [facility]: Hadii aad ku hadasho Soomaali, waaxda luqadaha, qaybta kaalmada riya, neeta vela . So [facility] [phone].    ATENCIÓN: Si habla español, tiene a benton disposición servicios gratuitos de asistencia lingüística. Llame al [phone].    We comply with applicable federal civil rights  laws and [place] laws. We do not discriminate on the basis of race, color, national origin, age, disability, sex, sexual orientation, or gender identity.            Thank you!     Thank you for choosing [facility]  for your care. Our goal is always to provide you with excellent care. Hearing back from our patients is one way we can continue to improve our services. Please take a few minutes to complete the written survey that you may receive in the mail after your visit with us. Thank you!             Your Updated Medication List - Protect others around you: Learn how to safely use, store and throw away your medicines at www.disposemymeds.org.          This list is accurate as of 4/4/18 11:59 PM.  Always use your most recent med list.                   Brand Name Dispense Instructions for use Diagnosis    albuterol 108 (90 BASE) MCG/ACT Inhaler    PROAIR HFA/PROVENTIL HFA/VENTOLIN HFA    1 Inhaler    Inhale 2 puffs into the lungs every 6 hours as needed for shortness of breath / dyspnea or wheezing    Cough, persistent       FLUoxetine 20 MG capsule    PROzac    90 capsule    Take 1 capsule (20 mg) by mouth daily    HARLEY (generalized anxiety disorder)       ibuprofen 200 MG tablet    ADVIL/MOTRIN     1 TABLET EVERY 4 TO 6 HOURS AS NEEDED        levothyroxine 150 MCG tablet    SYNTHROID/LEVOTHROID    90 tablet    Take 1 tablet (150 mcg) by mouth daily    Hypothyroidism, unspecified type       MULTIVITAMIN ADULT PO      Take 1 tablet by mouth daily        ZYRTEC 10 MG tablet   Generic drug:  cetirizine      1 TABLET DAILY

## 2018-04-04 NOTE — LETTER
4/4/2018         RE: Aishwarya Savage  [address]  [facility] [address]        Dear Colleague,    Thank you for referring your patient, Aishwarya Savage, to the [facility]. Please see a copy of my visit note below.    Chief Complaint   Patient presents with     COMPREHENSIVE EYE EXAM      Accompanied by  who also has an appointment  Last Eye Exam: 2017  Dilated Previously: Yes    What are you currently using to see?  Glasses for distance - dog ate  - using readers - may be interested in a bifocal       Distance Vision Acuity: Noticed gradual change in both eyes    Near Vision Acuity: Not satisfied     Eye Comfort: dry  Do you use eye drops? : No  Occupation or Hobbies:  provider    Keira Davidson  Optometric Tech            Medical, surgical and family histories reviewed and updated 4/4/2018.       OBJECTIVE: See Ophthalmology exam    ASSESSMENT:    ICD-10-CM    1. Hyperopia of both eyes with astigmatism and presbyopia H52.03 EYE EXAM (SIMPLE-NONBILLABLE)    H52.203 REFRACTION    H52.4    2. Glaucoma suspect of both eyes H40.003 EYE EXAM (SIMPLE-NONBILLABLE)     OPHTHALMOLOGY ADULT REFERRAL      PLAN:     Patient Instructions   It would be a good idea to repeat the glaucoma testing again. Referral card given. Aishwarya will check insurance coverage and call to make an appointment.    Your eyes may be blurry at near and sensitive to light for several hours from the dilating drops.    Yearly eye exams recommended.    Thank you!           Again, thank you for allowing me to participate in the care of your patient.        Sincerely,        Giulia Bah OD

## 2018-04-05 NOTE — PATIENT INSTRUCTIONS
It would be a good idea to repeat the glaucoma testing again. Referral card given. Aishwarya will check insurance coverage and call to make an appointment.    Your eyes may be blurry at near and sensitive to light for several hours from the dilating drops.    Yearly eye exams recommended.    Thank you!

## 2018-04-11 ENCOUNTER — TELEPHONE (OUTPATIENT)
Dept: OPHTHALMOLOGY | Facility: CLINIC | Age: 53
End: 2018-04-11

## 2018-04-11 NOTE — TELEPHONE ENCOUNTER
Prior authorization not required for office visits. There will be no procedures done during this visit. There is a referral in the chart already for this visit from the doctor that saw her and wants additional tests done.  Devika HOOVER. [facility]

## 2018-04-11 NOTE — TELEPHONE ENCOUNTER
[facility] Call Center    Phone Message    May a detailed message be left on voicemail: yes    Reason for Call: Other: Patient is requesting a prior auth be sent for Dr. Jameson visit. Please advise.      Action Taken: Message routed to:  Adult Clinics: Eye p [phone]

## 2018-04-20 ASSESSMENT — REFRACTION_MANIFEST
OS_ADD: +2.25
OD_ADD: +2.25

## 2018-04-24 ENCOUNTER — APPOINTMENT (OUTPATIENT)
Dept: OPTOMETRY | Facility: CLINIC | Age: 53
End: 2018-04-24
Payer: COMMERCIAL

## 2018-04-24 PROCEDURE — 92340 FIT SPECTACLES MONOFOCAL: CPT | Performed by: OPTOMETRIST

## 2018-05-01 ENCOUNTER — OFFICE VISIT (OUTPATIENT)
Dept: FAMILY MEDICINE | Facility: CLINIC | Age: 53
End: 2018-05-01
Payer: COMMERCIAL

## 2018-05-01 VITALS
TEMPERATURE: 98.1 F | HEIGHT: 64 IN | HEART RATE: 69 BPM | OXYGEN SATURATION: 96 % | SYSTOLIC BLOOD PRESSURE: 130 MMHG | RESPIRATION RATE: 18 BRPM | DIASTOLIC BLOOD PRESSURE: 82 MMHG

## 2018-05-01 DIAGNOSIS — J06.9 URI WITH COUGH AND CONGESTION: Primary | ICD-10-CM

## 2018-05-01 DIAGNOSIS — G47.33 OSA (OBSTRUCTIVE SLEEP APNEA): ICD-10-CM

## 2018-05-01 DIAGNOSIS — F41.1 GAD (GENERALIZED ANXIETY DISORDER): ICD-10-CM

## 2018-05-01 DIAGNOSIS — J20.9 ACUTE BRONCHITIS WITH SYMPTOMS > 10 DAYS: ICD-10-CM

## 2018-05-01 DIAGNOSIS — E03.4 HYPOTHYROIDISM DUE TO ACQUIRED ATROPHY OF THYROID: ICD-10-CM

## 2018-05-01 DIAGNOSIS — R06.02 SHORTNESS OF BREATH: ICD-10-CM

## 2018-05-01 DIAGNOSIS — J30.1 ACUTE SEASONAL ALLERGIC RHINITIS DUE TO POLLEN: ICD-10-CM

## 2018-05-01 PROCEDURE — 99214 OFFICE O/P EST MOD 30 MIN: CPT | Performed by: FAMILY MEDICINE

## 2018-05-01 RX ORDER — CETIRIZINE HYDROCHLORIDE 10 MG/1
10 TABLET ORAL DAILY
Qty: 60 TABLET | Refills: 1 | Status: SHIPPED | OUTPATIENT
Start: 2018-05-01 | End: 2018-09-09

## 2018-05-01 RX ORDER — AZITHROMYCIN 250 MG/1
TABLET, FILM COATED ORAL
Qty: 6 TABLET | Refills: 0 | Status: SHIPPED | OUTPATIENT
Start: 2018-05-01 | End: 2018-12-10

## 2018-05-01 ASSESSMENT — PAIN SCALES - GENERAL: PAINLEVEL: MODERATE PAIN (4)

## 2018-05-01 NOTE — PATIENT INSTRUCTIONS
At [facility], we strive to deliver an exceptional experience to you, every time we see you.  If you receive a survey in the mail, please send us back your thoughts. We really do value your feedback.    Based on your medical history, these are the current health maintenance/preventive care services that you are due for (some may have been done at this visit.)  Health Maintenance Due   Topic Date Due     HIV SCREEN (SYSTEM ASSIGNED)  03/29/1981     ADVANCE DIRECTIVE PLANNING Q5 YRS  03/29/2018     PHQ-9 Q6 MONTHS  05/28/2018         Suggested websites for health information:  Www.DJZ.[employer] : Up to date and easily searchable information on multiple topics.  Www.Scint-X.gov : medication info, interactive tutorials, watch real surgeries online  Www.familydoctor.org : good info from the Academy of Family Physicians  Www.cdc.gov : public health info, travel advisories, epidemics (H1N1)  Www.aap.org : children's health info, normal development, vaccinations  Www.health.[facility].[place].us : [place] dept of health, public health issues in [place], N1N1    Your care team:                            Family Medicine Internal Medicine   MD Toby Short MD Shantel Branch-Fleming, MD Katya Georgiev PA-C Nam Ho, MD Pediatrics   JUAN Gonzalez, MD Coreen Landry CNP, MD Deborah Mielke, MD Kim Thein, APRN [employer]      Clinic hours: Monday - Thursday 7 am-7 pm; Fridays 7 am-5 pm.   Urgent care: Monday - Friday 11 am-9 pm; Saturday and Sunday 9 am-5 pm.  Pharmacy : Monday -Thursday 8 am-8 pm; Friday 8 am-6 pm; Saturday and Sunday 9 am-5 pm.     Clinic: [phone]   Pharmacy: [phone]    Allergic Rhinitis  Allergic rhinitis is an allergic reaction that affects the nose, and often the eyes. It s often known as nasal allergies. Nasal allergies are often due to things in the environment that are breathed in. Depending what you are  sensitive to, nasal allergies may occur only during certain seasons. Or they may occur year round. Common indoor allergens include house dust mites, mold, cockroaches, and pet dander. Outdoor allergens include pollen from trees, grasses, and weeds.   Symptoms include a drippy, stuffy, and itchy nose. They also include sneezing and red and itchy eyes. You may feel tired more often. Severe allergies may also affect your breathing and trigger a condition called asthma.   Tests can be done to see what allergens are affecting you. You may be referred to an allergy specialist for testing and further evaluation.  Home care  Your healthcare provider may prescribe medicines to help relieve allergy symptoms. These may include oral medicines, nasal sprays, or eye drops.  Ask your provider for advice on how to avoid substances that you are allergic to. Below are a few tips for each type of allergen.  Pet dander:    Do not have pets with fur and feathers.    If you can't avoid having a pet, keep it out of your bedroom and off upholstered furniture.  Pollen:    When pollen counts are high, keep windows of your car and home closed. If possible, use an air conditioner instead.    Wear a filter mask when mowing or doing yard work.  House dust mites:    Wash bedding every week in warm water and detergent and dry on a hot setting.    Cover the mattress, box spring, and pillows with allergy covers.     If possible, sleep in a room with no carpet, curtains, or upholstered furniture.  Cockroaches:    Store food in sealed containers.    Remove garbage from the home promptly.    Fix water leaks  Mold:    Keep humidity low by using a dehumidifier or air conditioner. Keep the dehumidifier and air conditioner clean and free of mold.    Clean moldy areas with bleach and water.  In general:    Vacuum once or twice a week. If possible, use a vacuum with a high-efficiency particulate air (HEPA) filter.    Do not smoke. Avoid cigarette smoke.  Cigarette smoke is an irritant that can make symptoms worse.  Follow-up care  Follow up as advised by the healthcare provider or our staff. If you were referred to an allergy specialist, make this appointment promptly.  When to seek medical advice  Call your healthcare provider right away if the following occur:    Coughing or wheezing    Fever of 100.4 F (38 C) or higher, or as directed by your healthcare provider    Raised red bumps (hives)    Continuing symptoms, new symptoms, or worsening symptoms  Call 911 if you have:    Trouble breathing    Severe swelling of the face or severe itching of the eyes or mouth  Date Last Reviewed: 3/1/2017    5351-4524 [employer]. [address] [place] [address]. All rights reserved. This information is not intended as a substitute for professional medical care. Always follow your healthcare professional's instructions.        Bronchitis, Antibiotic Treatment (Adult)    Bronchitis is an infection of the air passages (bronchial tubes) in your lungs. It often occurs when you have a cold. This illness is contagious during the first few days and is spread through the air by coughing and sneezing, or by direct contact (touching the sick person and then touching your own eyes, nose, or mouth).  Symptoms of bronchitis include cough with mucus (phlegm) and low-grade fever. Bronchitis usually lasts 7 to 14 days. Mild cases can be treated with simple home remedies. More severe infection is treated with an antibiotic.  Home care  Follow these guidelines when caring for yourself at home:    If your symptoms are severe, rest at home for the first 2 to 3 days. When you go back to your usual activities, don't let yourself get too tired.    Do not smoke. Also avoid being exposed to secondhand smoke.    You may use over-the-counter medicines to control fever or pain, unless another medicine was prescribed. If you have chronic liver or kidney disease or have ever had a  stomach ulcer or gastrointestinal bleeding, talk with your healthcare provider before using these medicines. Also talk to your provider if you are taking medicine to prevent blood clots. Aspirin should never be given to anyone younger than 18 years of age who is ill with a viral infection or fever. It may cause severe liver or brain damage.    Your appetite may be poor, so a light diet is fine. Avoid dehydration by drinking 6 to 8 glasses of fluids per day (such as water, soft drinks, sports drinks, juices, tea, or soup). Extra fluids will help loosen secretions in the nose and lungs.    Over-the-counter cough, cold, and sore-throat medicines will not shorten the length of the illness, but they may be helpful to reduce symptoms. (Note: Do not use decongestants if you have high blood pressure.)    Finish all antibiotic medicine. Do this even if you are feeling better after only a few days.  Follow-up care  Follow up with your healthcare provider, or as advised. If you had an X-ray or ECG (electrocardiogram), a specialist will review it. You will be notified of any new findings that may affect your care.  If you are age 65 or older, or if you have a chronic lung disease or condition that affects your immune system, or you smoke, ask your healthcare provider about getting a pneumococcal vaccine and a yearly flu shot (influenza vaccine).  When to seek medical advice  Call your healthcare provider right away if any of these occur:    Fever of 100.4 F (38 C) or higher, or as directed by your healthcare provider    Coughing up increased amounts of colored sputum    Weakness, drowsiness, headache, facial pain, ear pain, or a stiff neck  Call 911  Call 911 if any of these occur.    Coughing up blood    Worsening weakness, drowsiness, headache, or stiff neck    Trouble breathing, wheezing, or pain with breathing  Date Last Reviewed: 9/13/2015 2000-2017 [employer]. [address] [place] [address], [place] [address].  All rights reserved. This information is not intended as a substitute for professional medical care. Always follow your healthcare professional's instructions.

## 2018-05-01 NOTE — MR AVS SNAPSHOT
After Visit Summary   5/1/2018    Aishwarya Savage    MRN: [id number]           Patient Information     Date Of Birth          1965        Visit Information        Provider Department      5/1/2018 5:40 PM Makeda Bryan MD [facility]        Today's Diagnoses     URI with cough and congestion    -  1    Shortness of breath        Hypothyroidism due to acquired atrophy of thyroid        HARLEY (generalized anxiety disorder)        CHEYANNE (obstructive sleep apnea)- mild (AHI 6)        Acute seasonal allergic rhinitis due to pollen        Acute bronchitis with symptoms > 10 days          Care Instructions    At [facility], we strive to deliver an exceptional experience to you, every time we see you.  If you receive a survey in the mail, please send us back your thoughts. We really do value your feedback.    Based on your medical history, these are the current health maintenance/preventive care services that you are due for (some may have been done at this visit.)  Health Maintenance Due   Topic Date Due     HIV SCREEN (SYSTEM ASSIGNED)  03/29/1981     ADVANCE DIRECTIVE PLANNING Q5 YRS  03/29/2018     PHQ-9 Q6 MONTHS  05/28/2018         Suggested websites for health information:  Www.[employer] : Up to date and easily searchable information on multiple topics.  Www.medlineplus.gov : medication info, interactive tutorials, watch real surgeries online  Www.familydoctor.org : good info from the Academy of Family Physicians  Www.cdc.gov : public health info, travel advisories, epidemics (H1N1)  Www.aap.org : children's health info, normal development, vaccinations  Www.health.[facility].[place].us : [place] dept of health, public health issues in [place], N1N1    Your care team:                            Family Medicine Internal Medicine   MD Toby Short MD Shantel Branch-Fleming, MD Katya Georgiev PA-C Nam Ho, MD Pediatrics   JUAN Gonzalez  OPAL Piedra APRMD Coreen Rey CNP, MD Deborah Mielke, MD Kim Thein, APRN CNP      Clinic hours: Monday - Thursday 7 am-7 pm; Fridays 7 am-5 pm.   Urgent care: Monday - Friday 11 am-9 pm; Saturday and Sunday 9 am-5 pm.  Pharmacy : Monday -Thursday 8 am-8 pm; Friday 8 am-6 pm; Saturday and Sunday 9 am-5 pm.     Clinic: [phone]   Pharmacy: [phone]    Allergic Rhinitis  Allergic rhinitis is an allergic reaction that affects the nose, and often the eyes. It s often known as nasal allergies. Nasal allergies are often due to things in the environment that are breathed in. Depending what you are sensitive to, nasal allergies may occur only during certain seasons. Or they may occur year round. Common indoor allergens include house dust mites, mold, cockroaches, and pet dander. Outdoor allergens include pollen from trees, grasses, and weeds.   Symptoms include a drippy, stuffy, and itchy nose. They also include sneezing and red and itchy eyes. You may feel tired more often. Severe allergies may also affect your breathing and trigger a condition called asthma.   Tests can be done to see what allergens are affecting you. You may be referred to an allergy specialist for testing and further evaluation.  Home care  Your healthcare provider may prescribe medicines to help relieve allergy symptoms. These may include oral medicines, nasal sprays, or eye drops.  Ask your provider for advice on how to avoid substances that you are allergic to. Below are a few tips for each type of allergen.  Pet dander:    Do not have pets with fur and feathers.    If you can't avoid having a pet, keep it out of your bedroom and off upholstered furniture.  Pollen:    When pollen counts are high, keep windows of your car and home closed. If possible, use an air conditioner instead.    Wear a filter mask when mowing or doing yard work.  House dust mites:    Wash bedding every week in warm water  and detergent and dry on a hot setting.    Cover the mattress, box spring, and pillows with allergy covers.     If possible, sleep in a room with no carpet, curtains, or upholstered furniture.  Cockroaches:    Store food in sealed containers.    Remove garbage from the home promptly.    Fix water leaks  Mold:    Keep humidity low by using a dehumidifier or air conditioner. Keep the dehumidifier and air conditioner clean and free of mold.    Clean moldy areas with bleach and water.  In general:    Vacuum once or twice a week. If possible, use a vacuum with a high-efficiency particulate air (HEPA) filter.    Do not smoke. Avoid cigarette smoke. Cigarette smoke is an irritant that can make symptoms worse.  Follow-up care  Follow up as advised by the healthcare provider or our staff. If you were referred to an allergy specialist, make this appointment promptly.  When to seek medical advice  Call your healthcare provider right away if the following occur:    Coughing or wheezing    Fever of 100.4 F (38 C) or higher, or as directed by your healthcare provider    Raised red bumps (hives)    Continuing symptoms, new symptoms, or worsening symptoms  Call 911 if you have:    Trouble breathing    Severe swelling of the face or severe itching of the eyes or mouth  Date Last Reviewed: 3/1/2017    3472-5906 [employer]. [address] [place], [place] [address]. All rights reserved. This information is not intended as a substitute for professional medical care. Always follow your healthcare professional's instructions.        Bronchitis, Antibiotic Treatment (Adult)    Bronchitis is an infection of the air passages (bronchial tubes) in your lungs. It often occurs when you have a cold. This illness is contagious during the first few days and is spread through the air by coughing and sneezing, or by direct contact (touching the sick person and then touching your own eyes, nose, or mouth).  Symptoms of bronchitis  include cough with mucus (phlegm) and low-grade fever. Bronchitis usually lasts 7 to 14 days. Mild cases can be treated with simple home remedies. More severe infection is treated with an antibiotic.  Home care  Follow these guidelines when caring for yourself at home:    If your symptoms are severe, rest at home for the first 2 to 3 days. When you go back to your usual activities, don't let yourself get too tired.    Do not smoke. Also avoid being exposed to secondhand smoke.    You may use over-the-counter medicines to control fever or pain, unless another medicine was prescribed. If you have chronic liver or kidney disease or have ever had a stomach ulcer or gastrointestinal bleeding, talk with your healthcare provider before using these medicines. Also talk to your provider if you are taking medicine to prevent blood clots. Aspirin should never be given to anyone younger than 18 years of age who is ill with a viral infection or fever. It may cause severe liver or brain damage.    Your appetite may be poor, so a light diet is fine. Avoid dehydration by drinking 6 to 8 glasses of fluids per day (such as water, soft drinks, sports drinks, juices, tea, or soup). Extra fluids will help loosen secretions in the nose and lungs.    Over-the-counter cough, cold, and sore-throat medicines will not shorten the length of the illness, but they may be helpful to reduce symptoms. (Note: Do not use decongestants if you have high blood pressure.)    Finish all antibiotic medicine. Do this even if you are feeling better after only a few days.  Follow-up care  Follow up with your healthcare provider, or as advised. If you had an X-ray or ECG (electrocardiogram), a specialist will review it. You will be notified of any new findings that may affect your care.  If you are age 65 or older, or if you have a chronic lung disease or condition that affects your immune system, or you smoke, ask your healthcare provider about getting  a pneumococcal vaccine and a yearly flu shot (influenza vaccine).  When to seek medical advice  Call your healthcare provider right away if any of these occur:    Fever of 100.4 F (38 C) or higher, or as directed by your healthcare provider    Coughing up increased amounts of colored sputum    Weakness, drowsiness, headache, facial pain, ear pain, or a stiff neck  Call 911  Call 911 if any of these occur.    Coughing up blood    Worsening weakness, drowsiness, headache, or stiff neck    Trouble breathing, wheezing, or pain with breathing  Date Last Reviewed: 9/13/2015 2000-2017 [employer]. [address] [place] [address]. All rights reserved. This information is not intended as a substitute for professional medical care. Always follow your healthcare professional's instructions.                Follow-ups after your visit        Follow-up notes from your care team     Return in about 3 months (around 8/1/2018).      Your next 10 appointments already scheduled     Jun 11, 2018  3:30 PM CDT   New Visit with Olvin Jameson MD, [facility]    [address] [place] [id number] [phone]              Who to contact     If you have questions or need follow up information about today's clinic visit or your schedule please contact [facility] directly at [phone].  Normal or non-critical lab and imaging results will be communicated to you by MyChart, letter or phone within 4 business days after the clinic has received the results. If you do not hear from us within 7 days, please contact the clinic through MyChart or phone. If you have a critical or abnormal lab result, we will notify you by phone as soon as possible.  Submit refill requests through [employer] or call your pharmacy and they will forward the refill request to us. Please allow 3 business days for your refill to be  "completed.          Additional Information About Your Visit        [employer] Information     [employer] gives you secure access to your electronic health record. If you see a primary care provider, you can also send messages to your care team and make appointments. If you have questions, please call your primary care clinic.  If you do not have a primary care provider, please call [phone] and they will assist you.        Care EveryWhere ID     This is your Care EveryWhere ID. This could be used by other organizations to access your [place] medical records  [id number]        Your Vitals Were     Pulse Temperature Respirations Height Last Period Pulse Oximetry    69 98.1  F (36.7  C) (Oral) 18 5' 4.25\" (1.632 m) 02/24/2014 96%    Breastfeeding?                   No            Blood Pressure from Last 3 Encounters:   05/01/18 130/82   11/28/17 132/74   04/05/17 126/84    Weight from Last 3 Encounters:   11/28/17 180 lb (81.6 kg)   10/25/16 179 lb (81.2 kg)   09/08/16 178 lb (80.7 kg)              Today, you had the following     No orders found for display         Today's Medication Changes          These changes are accurate as of 5/1/18  6:42 PM.  If you have any questions, ask your nurse or doctor.               Start taking these medicines.        Dose/Directions    azithromycin 250 MG tablet   Commonly known as:  ZITHROMAX   Used for:  Acute bronchitis with symptoms > 10 days   Started by:  Makeda Bryan MD        Two tablets first day, then one tablet daily for four days.   Quantity:  6 tablet   Refills:  0         These medicines have changed or have updated prescriptions.        Dose/Directions    cetirizine 10 MG tablet   Commonly known as:  ZYRTEC   This may have changed:  See the new instructions.   Used for:  Acute seasonal allergic rhinitis due to pollen   Changed by:  Makeda Bryan MD        Dose:  10 mg   Take 1 tablet (10 mg) by mouth daily   Quantity:  60 tablet   Refills:  1          "   Where to get your medicines      These medications were sent to [facility] [address] [facility] - [place], [place] - [address]  [address] [address]     Phone:  [phone]     azithromycin 250 MG tablet    cetirizine 10 MG tablet                Primary Care Provider Office Phone # Fax #    Makeda Gabrielle Bryan [phone]       [address]  [place] [address]        Equal Access to Services     [facility]: Hadii aad ku hadasho Soomaali, waaxda luqadaha, qaybta kaalmada adeegyada, waxay idiin hayaan adeeg kharash la'raj . So [facility] [phone].    ATENCIÓN: Si habla español, tiene a benton disposición servicios gratuitos de asistencia lingüística. [facility] [phone].    We comply with applicable federal civil rights laws and [place] laws. We do not discriminate on the basis of race, color, national origin, age, disability, sex, sexual orientation, or gender identity.            Thank you!     Thank you for choosing [facility]  for your care. Our goal is always to provide you with excellent care. Hearing back from our patients is one way we can continue to improve our services. Please take a few minutes to complete the written survey that you may receive in the mail after your visit with us. Thank you!             Your Updated Medication List - Protect others around you: Learn how to safely use, store and throw away your medicines at www.disposemymeds.org.          This list is accurate as of 5/1/18  6:42 PM.  Always use your most recent med list.                   Brand Name Dispense Instructions for use Diagnosis    albuterol 108 (90 Base) MCG/ACT Inhaler    PROAIR HFA/PROVENTIL HFA/VENTOLIN HFA    1 Inhaler    Inhale 2 puffs into the lungs every 6 hours as needed for shortness of breath / dyspnea or wheezing    Cough, persistent       azithromycin 250 MG tablet    ZITHROMAX    6 tablet    Two tablets first day, then one tablet daily for four days.     Acute bronchitis with symptoms > 10 days       cetirizine 10 MG tablet    ZYRTEC    60 tablet    Take 1 tablet (10 mg) by mouth daily    Acute seasonal allergic rhinitis due to pollen       FLUoxetine 20 MG capsule    PROzac    90 capsule    Take 1 capsule (20 mg) by mouth daily    HARLEY (generalized anxiety disorder)       ibuprofen 200 MG tablet    ADVIL/MOTRIN     1 TABLET EVERY 4 TO 6 HOURS AS NEEDED        levothyroxine 150 MCG tablet    SYNTHROID/LEVOTHROID    90 tablet    Take 1 tablet (150 mcg) by mouth daily    Hypothyroidism, unspecified type       MULTIVITAMIN ADULT PO      Take 1 tablet by mouth daily

## 2018-05-01 NOTE — PROGRESS NOTES
SUBJECTIVE:   Aishwarya Savage is a 53 year old female who presents to clinic today for the following health issues:    Acute Illness   Acute illness concerns: Nasal drainage with SOB  Onset: 3/2/18 started with burning in throat, loss of voice and sinus problems x10 days. Voice came back, throat pain subsided and chest congestion subsided but continues to have other symptoms, including cough and back pain    Fever: no    Chills/Sweats: no     Headache (location?): YES- with intermittent dizziness    Sinus Pressure:YES    Conjunctivitis:  no    Ear Pain: no    Rhinorrhea: YES- post nasal drainage    Congestion: No but chest tightness    Sore Throat: no but feels like there is a heart beat in the throat     Cough: YES intermittent, SOB    Wheeze: YES- sometimes but not severe    Decreased Appetite: no     Nausea: no     Vomiting: no     Diarrhea:  no    Dysuria/Freq.: no    Fatigue/Achiness: no    Sick/Strep Exposure: no     Therapies Tried and outcome: Inhaler, steam, Sudafed, nasal spray, allergy pill      Depression and Anxiety Follow-Up    Status since last visit: No change    Other associated symptoms:None    Complicating factors:     Significant life event: No     Current substance abuse: None    PHQ-9 10/28/2015 10/11/2016 11/30/2017   Total Score 9 11 6   Q9: Suicide Ideation Not at all Several days Not at all     HARLEY-7 SCORE 10/28/2015 10/11/2016 11/30/2017   Total Score 3 2 1       PHQ-9  English  PHQ-9   Any Language  HARLEY-7  Suicide Assessment Five-step Evaluation and Treatment (SAFE-T)  Hypothyroidism Follow-up      Since last visit, patient describes the following symptoms: Weight stable, no hair loss, no skin changes, no constipation, no loose stools      Problem list and histories reviewed & adjusted, as indicated.  Additional history: as documented    Patient Active Problem List   Diagnosis     Hypothyroidism     HSV (herpes simplex virus) infection     Depression     CARDIOVASCULAR SCREENING; LDL  GOAL LESS THAN 160     Glaucoma suspect     HARLEY (generalized anxiety disorder)     CHEYANNE (obstructive sleep apnea)- mild (AHI 6)     Fibromyalgia     Hyperlipidemia LDL goal <160     Situational mixed anxiety and depressive disorder     Past Surgical History:   Procedure Laterality Date     CL AFF SURGICAL PATHOLOGY  2001     COLONOSCOPY  2/21/2014    Procedure: COLONOSCOPY;  Colonoscopy, screening;  Surgeon: Micheal Webb MD;  Location: [facility]     HC EXCISE HAND/FOOT NEUROMA  2009, 2010    Rt Foot     HC TOOTH EXTRACTION W/FORCEP       TUBAL LIGATION  1/91       Social History   Substance Use Topics     Smoking status: Never Smoker     Smokeless tobacco: Never Used     Alcohol use 1.2 oz/week     2 Standard drinks or equivalent per week      Comment: Socially     Family History   Problem Relation Age of Onset     CANCER Maternal Grandmother      lung     CANCER Mother      lung     Blood Disease Mother      nonhodgkins lymphoma     Thyroid Disease Mother      hypothyroid     DIABETES Mother      Type 2     DIABETES Father      2     Hypertension Father      C.A.D. Father      ? MI at 69     Cardiovascular Paternal Grandfather      AAA     Breast Cancer Other      Glaucoma Paternal Grandmother      DIABETES Sister      diabetic     CEREBROVASCULAR DISEASE Maternal Aunt      Macular Degeneration No family hx of          Current Outpatient Prescriptions   Medication Sig Dispense Refill     albuterol (PROAIR HFA/PROVENTIL HFA/VENTOLIN HFA) 108 (90 BASE) MCG/ACT Inhaler Inhale 2 puffs into the lungs every 6 hours as needed for shortness of breath / dyspnea or wheezing 1 Inhaler 3     FLUoxetine (PROZAC) 20 MG capsule Take 1 capsule (20 mg) by mouth daily 90 capsule 3     IBUPROFEN 200 MG OR TABS 1 TABLET EVERY 4 TO 6 HOURS AS NEEDED       levothyroxine (SYNTHROID/LEVOTHROID) 150 MCG tablet Take 1 tablet (150 mcg) by mouth daily 90 tablet 3     Multiple Vitamins-Minerals (MULTIVITAMIN ADULT PO) Take 1 tablet by mouth  "daily       ZYRTEC 10 MG OR TABS 1 TABLET DAILY       Allergies   Allergen Reactions     Clindamycin Swelling     face     Sulfa Drugs Hives     Recent Labs   Lab Test  11/28/17   1821  10/25/16   1834  10/28/15   1554   10/28/14   1925   11/21/11   1842   LDL   --   102*   --    --    --    --   97   HDL   --   53   --    --    --    --   55   TRIG   --    --    --    --    --    --   82   ALT   --    --   31   --   34   --    --    CR   --    --   0.73   --   0.88   < >   --    GFRESTIMATED   --    --   84   --   68   < >   --    GFRESTBLACK   --    --   >90   GFR Calc     --   82   < >   --    POTASSIUM   --    --   3.7   --   4.3   < >   --    TSH  0.43  0.45  0.51   < >   --    < >  0.60    < > = values in this interval not displayed.      BP Readings from Last 3 Encounters:   05/01/18 130/82   11/28/17 132/74   04/05/17 126/84    Wt Readings from Last 3 Encounters:   11/28/17 180 lb (81.6 kg)   10/25/16 179 lb (81.2 kg)   09/08/16 178 lb (80.7 kg)                  Labs reviewed in EPIC    Reviewed and updated as needed this visit by clinical staff       Reviewed and updated as needed this visit by Provider         ROS:  Constitutional, HEENT, cardiovascular, pulmonary, gi and gu systems are negative, except as otherwise noted.    OBJECTIVE:     /82 (BP Location: Right arm, Patient Position: Chair, Cuff Size: Adult Regular)  Pulse 69  Temp 98.1  F (36.7  C) (Oral)  Resp 18  Ht 5' 4.25\" (1.632 m)  LMP 02/24/2014  SpO2 96%  Breastfeeding? No  There is no height or weight on file to calculate BMI.  GENERAL: acutely ill, alert, well nourished, well hydrated, no distress, with cough  HENT: ear canals- normal; TMs- normal; Nose- rhinorrhea ; Mouth- no ulcers, no lesions, throat is erythematous without exudate. Sinuses without tenderness to percussion.   NECK: no tenderness, negative anterior cervical adenopathy, no asymmetry, no masses, no stiffness; thyroid- normal to palpation  RESP: " lungs clear to auscultation - no rales, no rhonchi, some expiratory wheezes  CV: regular rates and rhythm, normal S1 S2, no S3 or S4 and no murmur, no click or rub -  ABDOMEN: soft, no tenderness, no  hepatosplenomegaly, no masses, normal bowel sounds  MS: extremities- no gross deformities noted, no edema  SKIN: no suspicious lesions, no rashes  PSYCH: Alert and oriented times 3; affect- normal     Diagnostic Test Results:  none     ASSESSMENT/PLAN:               ICD-10-CM    1. URI with cough and congestion J06.9 Symptoms started with upper respiratory infection but seem to be more bronchitis that is persistent beyond 3 weeks. Symptomatic treatment with rest, fluids, tylenol and OTC cold medications as needed. Call if symptoms worse or do not improve for further evaluation and treatment.    2. Acute bronchitis with symptoms > 10 days J20.9 azithromycin (ZITHROMAX) 250 MG tablet   3. Shortness of breath R06.02 Using albuterol inhaler and this is helping.   4. Hypothyroidism due to acquired atrophy of thyroid E03.4 Stable    5. HARLEY (generalized anxiety disorder) F41.1 Increased anxiety due to relationship issues and not being allowed to rest.    6. CHEYANNE (obstructive sleep apnea)- mild (AHI 6) G47.33 Snoring more with upper respiratory infection symptoms    7. Acute seasonal allergic rhinitis due to pollen J30.1 cetirizine (ZYRTEC) 10 MG tablet restart and may use twice a day during season if symptoms are severe.        FUTURE APPOINTMENTS:       - Follow-up for annual visit or as needed  See Patient Instructions    Makeda Bryan MD  [facility]

## 2018-06-11 ENCOUNTER — OFFICE VISIT (OUTPATIENT)
Dept: OPHTHALMOLOGY | Facility: CLINIC | Age: 53
End: 2018-06-11
Attending: OPTOMETRIST
Payer: COMMERCIAL

## 2018-06-11 DIAGNOSIS — H40.003 GLAUCOMA SUSPECT OF BOTH EYES: Primary | ICD-10-CM

## 2018-06-11 PROCEDURE — 92004 COMPRE OPH EXAM NEW PT 1/>: CPT | Performed by: OPHTHALMOLOGY

## 2018-06-11 PROCEDURE — 92133 CPTRZD OPH DX IMG PST SGM ON: CPT | Performed by: OPHTHALMOLOGY

## 2018-06-11 PROCEDURE — 92083 EXTENDED VISUAL FIELD XM: CPT | Performed by: OPHTHALMOLOGY

## 2018-06-11 ASSESSMENT — VISUAL ACUITY
OD_SC: 20/25
METHOD: SNELLEN - LINEAR
OD_SC+: -1
OS_SC: 20/20

## 2018-06-11 ASSESSMENT — CUP TO DISC RATIO
OD_RATIO: 0.7
OS_RATIO: 0.5

## 2018-06-11 ASSESSMENT — EXTERNAL EXAM - RIGHT EYE: OD_EXAM: NORMAL

## 2018-06-11 ASSESSMENT — SLIT LAMP EXAM - LIDS
COMMENTS: MEIBOMIAN GLAND DYSFUNCTION
COMMENTS: MEIBOMIAN GLAND DYSFUNCTION

## 2018-06-11 ASSESSMENT — EXTERNAL EXAM - LEFT EYE: OS_EXAM: NORMAL

## 2018-06-11 ASSESSMENT — TONOMETRY
OS_IOP_MMHG: 18
IOP_METHOD: ICARE
OD_IOP_MMHG: 17

## 2018-06-11 NOTE — NURSING NOTE
Patient presents with:  Glaucoma Evaluation: ref by dr bah      Referring Provider:  [place]  [address] [place] [address]    HPI    Last Eye Exam:  4/10/18   Informant(s):  pt   Symptoms:           Do you have eye pain now?:  No      Comments:  Ref by Dr Bah for glaucoma evaluation  Pt states she has occasional headaches behind both eyes  No visual complaints  Pt uses art tears prn             Erika Gonzalez, COA

## 2018-06-11 NOTE — PROGRESS NOTES
Assessment & Plan   Aishwarya Savage is a 53 year old female who presents with:   Review of systems for the eyes was negative other than the pertinent positives and negatives noted in the HPI.  History is obtained from the patient.    Chief Complaint   Patient presents with     Glaucoma Evaluation     ref by dr giordano       Glaucoma suspect of both eyes  - OCT Optic Nerve RNFL Optovue OU (both eyes)  - HVF 24-2 OU  - Recommend Dyopisis at the [place] office    All reassuring. Recommend repeat testing in one year.      Return in 1 year for DFE, OCT, HVF    Documentation for today's encounter was performed by Devika Del Toro COA. OSC. Acting as a scribe in my presence. I have reviewed and verified that it is an accurate recording of today's encounter.    Attending Physician Attestation:  Complete documentation of historical and exam elements from today's encounter can be found in the full encounter summary report (not reduplicated in this progress note).  I personally obtained the chief complaint(s) and history of present illness.  I confirmed and edited as necessary the review of systems, past medical/surgical history, family history, social history, and examination findings as documented by others; and I examined the patient myself.  I personally reviewed the relevant tests, images, and reports as documented above.  I formulated and edited as necessary the assessment and plan and discussed the findings and management plan with the patient and family. - Olvin Jameson MD

## 2018-06-11 NOTE — MR AVS SNAPSHOT
After Visit Summary   6/11/2018    Aishwarya Savage    MRN: [id number]           Patient Information     Date Of Birth          1965        Visit Information        Provider Department      6/11/2018 3:30 PM Olvin Jameson MD;  OPHTH NURSE ONLY [facility]        Today's Diagnoses     Glaucoma suspect of both eyes    -  1      Care Instructions    What Is Glaucoma?   Glaucoma is an eye disease that can cause blindness. If caught early, it can usually be controlled. But it often has no symptoms, so you need regular eye exams. Glaucoma usually begins when pressure builds up in the eye. This pressure can damage the optic nerve. The optic nerve sends messages to the brain so you can see. There are two main kinds of glaucoma:  open-angle  and  closed-angle.   Drainage area  The eye is always producing fluid. The eye s drainage areas may become clogged or blocked. Too much fluid stays in the eye. This increases eye pressure.  Optic nerve  Too much pressure in the eye can damage the optic nerve. If damaged, this nerve cannot send the messages to the brain that let you see.  Open-Angle Glaucoma  Open-angle is the most common kind of glaucoma. It occurs slowly as people age. The drainage area in the eye becomes clogged. Not enough fluid drains from the eye, so pressure slowly builds up. This causes gradual loss of side (peripheral) vision. You may not even notice changes until much of your vision is lost.   Closed-Angle Glaucoma  Closed-angle glaucoma is less common than open-angle. It usually comes on quickly. The drainage area in the eye suddenly becomes completely blocked. Eye pressure builds rapidly. You may notice blurred vision and rainbow halos around lights. You may also have headaches, nausea, vomiting, and severe pain. If not treated right away, blindness can occur quickly.    7351-5055 [facility], [address] [place] [address], [place] [address]. All rights reserved.  This information is not intended as a substitute for professional medical care. Always follow your healthcare professional's instructions.            Follow-ups after your visit        Follow-up notes from your care team     Return in about 1 year (around 6/11/2019) for Annual Eye Exam, Glaucoma check, Visual field and OCT.      Who to contact     If you have questions or need follow up information about today's clinic visit or your schedule please contact [facility] directly at [phone].  Normal or non-critical lab and imaging results will be communicated to you by Office Depothart, letter or phone within 4 business days after the clinic has received the results. If you do not hear from us within 7 days, please contact the clinic through Office Depothart or phone. If you have a critical or abnormal lab result, we will notify you by phone as soon as possible.  Submit refill requests through [employer] or call your pharmacy and they will forward the refill request to us. Please allow 3 business days for your refill to be completed.          Additional Information About Your Visit        Office Depothart Information     [employer] gives you secure access to your electronic health record. If you see a primary care provider, you can also send messages to your care team and make appointments. If you have questions, please call your primary care clinic.  If you do not have a primary care provider, please call [phone] and they will assist you.      [employer] is an electronic gateway that provides easy, online access to your medical records. With [employer], you can request a clinic appointment, read your test results, renew a prescription or communicate with your care team.     To access your existing account, please contact your [facility] or call [phone] for assistance.        Care EveryWhere ID     This is your Care EveryWhere ID. This could be used by other organizations to access your [place]  medical records  [id number]        Your Vitals Were     Last Period                   02/24/2014            Blood Pressure from Last 3 Encounters:   05/01/18 130/82   11/28/17 132/74   04/05/17 126/84    Weight from Last 3 Encounters:   11/28/17 81.6 kg (180 lb)   10/25/16 81.2 kg (179 lb)   09/08/16 80.7 kg (178 lb)              We Performed the Following     EYE EXAM, NEW PATIENT,COMPREHESV     HVF 24-2 OU     OCT Optic Nerve RNFL Optovue OU (both eyes)        Primary Care Provider Office Phone # Fax #    Makeda Gabrielle Bryan [phone]       [address] [place]  [place] [facility] [address]        Equal Access to Services     DAMARI FIGUEROA : Hadii susan boyd hadasho Soomaali, waaxda luqadaha, qaybta kaalmada adeegyada, waxay idiin hayraj vela . So [facility] [phone].    ATENCIÓN: Si habla español, tiene a benton disposición servicios gratuitos de asistencia lingüística. Llame al [phone].    We comply with applicable federal civil rights laws and [place] laws. We do not discriminate on the basis of race, color, national origin, age, disability, sex, sexual orientation, or gender identity.            Thank you!     Thank you for choosing [facility]  for your care. Our goal is always to provide you with excellent care. Hearing back from our patients is one way we can continue to improve our services. Please take a few minutes to complete the written survey that you may receive in the mail after your visit with us. Thank you!             Your Updated Medication List - Protect others around you: Learn how to safely use, store and throw away your medicines at www.disposemymeds.org.          This list is accurate as of 6/11/18 11:59 PM.  Always use your most recent med list.                   Brand Name Dispense Instructions for use Diagnosis    albuterol 108 (90 Base) MCG/ACT Inhaler    PROAIR HFA/PROVENTIL HFA/VENTOLIN HFA    1 Inhaler    Inhale 2 puffs into the lungs every 6  hours as needed for shortness of breath / dyspnea or wheezing    Cough, persistent       azithromycin 250 MG tablet    ZITHROMAX    6 tablet    Two tablets first day, then one tablet daily for four days.    Acute bronchitis with symptoms > 10 days       cetirizine 10 MG tablet    ZYRTEC    60 tablet    Take 1 tablet (10 mg) by mouth daily    Acute seasonal allergic rhinitis due to pollen       FLUoxetine 20 MG capsule    PROzac    90 capsule    Take 1 capsule (20 mg) by mouth daily    HARLEY (generalized anxiety disorder)       ibuprofen 200 MG tablet    ADVIL/MOTRIN     1 TABLET EVERY 4 TO 6 HOURS AS NEEDED        levothyroxine 150 MCG tablet    SYNTHROID/LEVOTHROID    90 tablet    Take 1 tablet (150 mcg) by mouth daily    Hypothyroidism, unspecified type       MULTIVITAMIN ADULT PO      Take 1 tablet by mouth daily

## 2018-06-11 NOTE — PATIENT INSTRUCTIONS
What Is Glaucoma?   Glaucoma is an eye disease that can cause blindness. If caught early, it can usually be controlled. But it often has no symptoms, so you need regular eye exams. Glaucoma usually begins when pressure builds up in the eye. This pressure can damage the optic nerve. The optic nerve sends messages to the brain so you can see. There are two main kinds of glaucoma:  open-angle  and  closed-angle.   Drainage area  The eye is always producing fluid. The eye s drainage areas may become clogged or blocked. Too much fluid stays in the eye. This increases eye pressure.  Optic nerve  Too much pressure in the eye can damage the optic nerve. If damaged, this nerve cannot send the messages to the brain that let you see.  Open-Angle Glaucoma  Open-angle is the most common kind of glaucoma. It occurs slowly as people age. The drainage area in the eye becomes clogged. Not enough fluid drains from the eye, so pressure slowly builds up. This causes gradual loss of side (peripheral) vision. You may not even notice changes until much of your vision is lost.   Closed-Angle Glaucoma  Closed-angle glaucoma is less common than open-angle. It usually comes on quickly. The drainage area in the eye suddenly becomes completely blocked. Eye pressure builds rapidly. You may notice blurred vision and rainbow halos around lights. You may also have headaches, nausea, vomiting, and severe pain. If not treated right away, blindness can occur quickly.    [id number] [facility], [address] [place] [address]. All rights reserved. This information is not intended as a substitute for professional medical care. Always follow your healthcare professional's instructions.

## 2018-06-26 ENCOUNTER — TELEPHONE (OUTPATIENT)
Dept: OPHTHALMOLOGY | Facility: CLINIC | Age: 53
End: 2018-06-26

## 2018-06-26 NOTE — TELEPHONE ENCOUNTER
"Feels like there is something in the eye. She has been using refresh but the dryness is not going away. Advised her to use ATs frequently. But not to use \"gets the red out gtts\" explained that the twitching is a muscle spasm and that is usually caused by stress / or to much caffeine.   Pt verbalized understanding and is going to call Dr Bah and see if she can get something stronger than OTC lubricating gtts.  Devika CARRILLO. COA. OSC    "

## 2018-06-26 NOTE — TELEPHONE ENCOUNTER
6.26.18  Pt is requesting a call back.    Had eyes dilated for last appt.  She has continued eye blurriness and twitching.  Has used refresh drops and not working.    [phone]

## 2018-09-09 DIAGNOSIS — J30.1 ACUTE SEASONAL ALLERGIC RHINITIS DUE TO POLLEN: ICD-10-CM

## 2018-09-10 NOTE — TELEPHONE ENCOUNTER
"Requested Prescriptions   Pending Prescriptions Disp Refills     cetirizine (ZYRTEC) 10 MG tablet [Pharmacy Med Name: CETIRIZINE HCL 10 MG TABLET]  Last Written Prescription Date:  05/01/18  Last Fill Quantity: 60,  # refills: 1   Last Office Visit with [facility]ULIO, [facility] or [facility] prescribing provider:  05/01/18-Randi   Future Office Visit:    60 tablet 1     Sig: TAKE 1 TABLET BY MOUTH EVERY DAY    Antihistamines Protocol Passed    9/9/2018 11:40 AM       Passed - Patient is 3-64 years of age    Apply weight-based dosing for peds patients age 3 - 12 years of age.    Forward request to provider for patients under the age of 3 or over the age of 64.         Passed - Recent (12 mo) or future (30 days) visit within the authorizing provider's specialty    Patient had office visit in the last 12 months or has a visit in the next 30 days with authorizing provider or within the authorizing provider's specialty.  See \"Patient Info\" tab in inbasket, or \"Choose Columns\" in Meds & Orders section of the refill encounter.              "

## 2018-09-11 RX ORDER — CETIRIZINE HYDROCHLORIDE 10 MG/1
TABLET ORAL
Qty: 60 TABLET | Refills: 3 | Status: SHIPPED | OUTPATIENT
Start: 2018-09-11

## 2018-10-22 DIAGNOSIS — R05.3 COUGH, PERSISTENT: ICD-10-CM

## 2018-10-22 NOTE — TELEPHONE ENCOUNTER
"Requested Prescriptions   Pending Prescriptions Disp Refills     albuterol (PROAIR HFA/PROVENTIL HFA/VENTOLIN HFA) 108 (90 Base) MCG/ACT inhaler [Pharmacy Med Name: VENTOLIN HFA 90 MCG INHALER]  Last Written Prescription Date:  11/28/17  Last Fill Quantity: 1,  # refills: 3   Last Office Visit with [facility], [facility] or [facility] prescribing provider:  05/01/18-Randi   Future Office Visit:    Next 5 appointments (look out 90 days)     Nov 13, 2018  5:40 PM CST   [facility]    [address] [facility] [address]   [phone]            Dec 11, 2018  5:40 PM CST   [facility]    [address] [facility] [address]   [phone]                18 Inhaler 3     Sig: INHALE 2 PUFFS BY MOUTH EVERY 6 HOURS AS NEEDED FOR SHORTNESS OF BREATH / DYSPNEA OR WHEEZING    Asthma Maintenance Inhalers - Anticholinergics Passed    10/22/2018  8:23 AM       Passed - Patient is age 12 years or older       Passed - Recent (12 mo) or future (30 days) visit within the authorizing provider's specialty    Patient had office visit in the last 12 months or has a visit in the next 30 days with authorizing provider or within the authorizing provider's specialty.  See \"Patient Info\" tab in inbasket, or \"Choose Columns\" in Meds & Orders section of the refill encounter.                "

## 2018-10-23 RX ORDER — ALBUTEROL SULFATE 90 UG/1
AEROSOL, METERED RESPIRATORY (INHALATION)
Qty: 1 INHALER | Refills: 3 | Status: SHIPPED | OUTPATIENT
Start: 2018-10-23 | End: 2019-12-16

## 2018-10-23 NOTE — TELEPHONE ENCOUNTER
Prescription approved per [facility] Refill Protocol.      Jose Gutierrez RN, BSN

## 2018-11-21 DIAGNOSIS — E03.9 HYPOTHYROIDISM, UNSPECIFIED TYPE: ICD-10-CM

## 2018-11-21 NOTE — TELEPHONE ENCOUNTER
"Requested Prescriptions   Pending Prescriptions Disp Refills     levothyroxine (SYNTHROID/LEVOTHROID) 150 MCG tablet [Pharmacy Med Name: LEVOTHYROXINE 150 MCG TABLET] 90 tablet 3     Sig: TAKE 1 TABLET (150 MCG) BY MOUTH DAILY    Thyroid Protocol Passed    11/21/2018 10:47 AM       Passed - Patient is 12 years or older       Passed - Recent (12 mo) or future (30 days) visit within the authorizing provider's specialty    Patient had office visit in the last 12 months or has a visit in the next 30 days with authorizing provider or within the authorizing provider's specialty.  See \"Patient Info\" tab in inbasket, or \"Choose Columns\" in Meds & Orders section of the refill encounter.             Passed - Normal TSH on file in past 12 months    Recent Labs   Lab Test  11/28/17   1821   TSH  0.43             Passed - No active pregnancy on record    If patient is pregnant or has had a positive pregnancy test, please check TSH.         Passed - No positive pregnancy test in past 12 months    If patient is pregnant or has had a positive pregnancy test, please check TSH.          Last Written Prescription Date:  11/28/17  Last Fill Quantity: 90,  # refills: 3   Last office visit: 5/1/2018 with prescribing provider:  5/1/18   Future Office Visit:   Next 5 appointments (look out 90 days)     Dec 11, 2018  5:40 PM CST   PHYSICAL with Makeda Bryan MD   [facility]    [address] [place] [id number]   [phone]                   "

## 2018-11-26 RX ORDER — LEVOTHYROXINE SODIUM 150 UG/1
TABLET ORAL
Qty: 90 TABLET | Refills: 1 | Status: SHIPPED | OUTPATIENT
Start: 2018-11-26 | End: 2019-07-13

## 2018-11-26 NOTE — TELEPHONE ENCOUNTER
Prescription approved per [facility] [place] Refill Protocol.  Itzel Gibbs RN

## 2018-11-27 ENCOUNTER — DOCUMENTATION ONLY (OUTPATIENT)
Dept: LAB | Facility: CLINIC | Age: 53
End: 2018-11-27

## 2018-11-27 DIAGNOSIS — E03.4 HYPOTHYROIDISM DUE TO ACQUIRED ATROPHY OF THYROID: ICD-10-CM

## 2018-11-27 DIAGNOSIS — E78.5 HYPERLIPIDEMIA LDL GOAL <160: Primary | ICD-10-CM

## 2018-11-27 NOTE — PROGRESS NOTES
Patient has an upcoming previsit appointment on 12/08/2018. Please review pended orders and add additional orders if needed.     Thank you,   Ileana Mejia

## 2018-12-06 DIAGNOSIS — E03.4 HYPOTHYROIDISM DUE TO ACQUIRED ATROPHY OF THYROID: ICD-10-CM

## 2018-12-06 DIAGNOSIS — E78.5 HYPERLIPIDEMIA LDL GOAL <160: ICD-10-CM

## 2018-12-06 PROCEDURE — 84443 ASSAY THYROID STIM HORMONE: CPT | Performed by: FAMILY MEDICINE

## 2018-12-06 PROCEDURE — 80061 LIPID PANEL: CPT | Performed by: FAMILY MEDICINE

## 2018-12-06 PROCEDURE — 36415 COLL VENOUS BLD VENIPUNCTURE: CPT | Performed by: FAMILY MEDICINE

## 2018-12-07 LAB
CHOLEST SERPL-MCNC: 181 MG/DL
HDLC SERPL-MCNC: 61 MG/DL
LDLC SERPL CALC-MCNC: 109 MG/DL
NONHDLC SERPL-MCNC: 120 MG/DL
TRIGL SERPL-MCNC: 57 MG/DL
TSH SERPL DL<=0.005 MIU/L-ACNC: 0.46 MU/L (ref 0.4–4)

## 2018-12-09 ASSESSMENT — ENCOUNTER SYMPTOMS
WEAKNESS: 0
ARTHRALGIAS: 1
PALPITATIONS: 0
HEADACHES: 0
NAUSEA: 0
SHORTNESS OF BREATH: 0
JOINT SWELLING: 0
FREQUENCY: 0
SORE THROAT: 0
BREAST MASS: 0
EYE PAIN: 0
MYALGIAS: 1
DIARRHEA: 0
CONSTIPATION: 0
HEMATOCHEZIA: 0
DYSURIA: 0
FEVER: 0
CHILLS: 0
ABDOMINAL PAIN: 0
COUGH: 0
HEMATURIA: 0
NERVOUS/ANXIOUS: 0
DIZZINESS: 0
PARESTHESIAS: 0
HEARTBURN: 0

## 2018-12-11 ENCOUNTER — OFFICE VISIT (OUTPATIENT)
Dept: FAMILY MEDICINE | Facility: CLINIC | Age: 53
End: 2018-12-11
Payer: COMMERCIAL

## 2018-12-11 VITALS
HEART RATE: 84 BPM | BODY MASS INDEX: 30.66 KG/M2 | OXYGEN SATURATION: 95 % | HEIGHT: 64 IN | RESPIRATION RATE: 20 BRPM | TEMPERATURE: 98.1 F | SYSTOLIC BLOOD PRESSURE: 124 MMHG | DIASTOLIC BLOOD PRESSURE: 82 MMHG

## 2018-12-11 DIAGNOSIS — F41.1 GAD (GENERALIZED ANXIETY DISORDER): Chronic | ICD-10-CM

## 2018-12-11 DIAGNOSIS — Z13.1 SCREENING FOR DIABETES MELLITUS: ICD-10-CM

## 2018-12-11 DIAGNOSIS — Z00.00 NORMAL PHYSICAL EXAM: Primary | ICD-10-CM

## 2018-12-11 DIAGNOSIS — Z23 NEED FOR PROPHYLACTIC VACCINATION AND INOCULATION AGAINST INFLUENZA: ICD-10-CM

## 2018-12-11 DIAGNOSIS — F32.0 CURRENT MILD EPISODE OF MAJOR DEPRESSIVE DISORDER WITHOUT PRIOR EPISODE (H): ICD-10-CM

## 2018-12-11 PROCEDURE — 90686 IIV4 VACC NO PRSV 0.5 ML IM: CPT | Performed by: FAMILY MEDICINE

## 2018-12-11 PROCEDURE — 90714 TD VACC NO PRESV 7 YRS+ IM: CPT | Performed by: FAMILY MEDICINE

## 2018-12-11 PROCEDURE — 90471 IMMUNIZATION ADMIN: CPT | Performed by: FAMILY MEDICINE

## 2018-12-11 PROCEDURE — 99396 PREV VISIT EST AGE 40-64: CPT | Mod: 25 | Performed by: FAMILY MEDICINE

## 2018-12-11 PROCEDURE — 90472 IMMUNIZATION ADMIN EACH ADD: CPT | Performed by: FAMILY MEDICINE

## 2018-12-11 PROCEDURE — 99213 OFFICE O/P EST LOW 20 MIN: CPT | Mod: 25 | Performed by: FAMILY MEDICINE

## 2018-12-11 RX ORDER — HYDROXYZINE HYDROCHLORIDE 25 MG/1
25 TABLET, FILM COATED ORAL EVERY 8 HOURS PRN
Qty: 30 TABLET | Refills: 3 | Status: SHIPPED | OUTPATIENT
Start: 2018-12-11 | End: 2018-12-21

## 2018-12-11 ASSESSMENT — ANXIETY QUESTIONNAIRES
7. FEELING AFRAID AS IF SOMETHING AWFUL MIGHT HAPPEN: SEVERAL DAYS
GAD7 TOTAL SCORE: 6
6. BECOMING EASILY ANNOYED OR IRRITABLE: NOT AT ALL
3. WORRYING TOO MUCH ABOUT DIFFERENT THINGS: SEVERAL DAYS
5. BEING SO RESTLESS THAT IT IS HARD TO SIT STILL: NOT AT ALL
2. NOT BEING ABLE TO STOP OR CONTROL WORRYING: SEVERAL DAYS
1. FEELING NERVOUS, ANXIOUS, OR ON EDGE: MORE THAN HALF THE DAYS
IF YOU CHECKED OFF ANY PROBLEMS ON THIS QUESTIONNAIRE, HOW DIFFICULT HAVE THESE PROBLEMS MADE IT FOR YOU TO DO YOUR WORK, TAKE CARE OF THINGS AT HOME, OR GET ALONG WITH OTHER PEOPLE: SOMEWHAT DIFFICULT

## 2018-12-11 ASSESSMENT — ENCOUNTER SYMPTOMS
HEARTBURN: 0
DIZZINESS: 0
ARTHRALGIAS: 1
MYALGIAS: 1
NAUSEA: 0
PALPITATIONS: 0
DIARRHEA: 0
SORE THROAT: 0
JOINT SWELLING: 0
PARESTHESIAS: 0
HEMATOCHEZIA: 0
SHORTNESS OF BREATH: 0
DYSURIA: 0
FREQUENCY: 0
CONSTIPATION: 0
HEMATURIA: 0
BREAST MASS: 0
EYE PAIN: 0
ABDOMINAL PAIN: 0
COUGH: 0
WEAKNESS: 0
HEADACHES: 0
CHILLS: 0
FEVER: 0
NERVOUS/ANXIOUS: 0

## 2018-12-11 ASSESSMENT — PATIENT HEALTH QUESTIONNAIRE - PHQ9
5. POOR APPETITE OR OVEREATING: SEVERAL DAYS
SUM OF ALL RESPONSES TO PHQ QUESTIONS 1-9: 6

## 2018-12-11 ASSESSMENT — PAIN SCALES - GENERAL: PAINLEVEL: NO PAIN (0)

## 2018-12-11 NOTE — RESULT ENCOUNTER NOTE
Dear Aishwarya    Your test results are attached. I am happy to let you know that they are stable.    The thyroid test is normal. The cholesterol looks good also.     Please contact me by MyChart if you have any questions about your labs or management.    Makeda Bryan MD

## 2018-12-11 NOTE — PROGRESS NOTES
SUBJECTIVE:   CC: Aishwarya Savage is an 53 year old woman who presents for preventive health visit.     Physical   Annual:     Getting at least 3 servings of Calcium per day:  Yes    Bi-annual eye exam:  Yes    Dental care twice a year:  NO    Sleep apnea or symptoms of sleep apnea:  Excessive snoring    Diet:  Regular (no restrictions)    Frequency of exercise:  None    Taking medications regularly:  Yes    Medication side effects:  Other    Additional concerns today:  Yes    PHQ-2 Total Score: 0    Additional Concerns:  1. Anxiety/panic attack  2. Pain in left ovary area  3. Rash in the vaginal area towards rectum x few months, tried Desitin  4. Pain in back and legs. Sister has familial spastic hemiplegia. 9 years older than patient and started to having symptoms 5 years ago.         Depression and Anxiety Follow-Up    Status since last visit: No change    Other associated symptoms:None    Complicating factors:     Significant life event: No     Current substance abuse: None    PHQ 10/11/2016 11/30/2017 12/11/2018   PHQ-9 Total Score 11 6 6   Q9: Suicide Ideation Several days Not at all Not at all     HARLEY-7 SCORE 10/11/2016 11/30/2017 12/11/2018   Total Score 2 1 6       PHQ-9  English  PHQ-9   Any Language  HARLEY-7  Suicide Assessment Five-step Evaluation and Treatment (SAFE-T)    Today's PHQ-2 Score:   PHQ-2 ( 1999 Pf[employer]) 12/9/2018   Q1: Little interest or pleasure in doing things 0   Q2: Feeling down, depressed or hopeless 0   PHQ-2 Score 0   Q1: Little interest or pleasure in doing things Not at all   Q2: Feeling down, depressed or hopeless Not at all   PHQ-2 Score 0       Abuse: Current or Past(Physical, Sexual or Emotional)- No  Do you feel safe in your environment? Yes    Social History     Tobacco Use     Smoking status: Never Smoker     Smokeless tobacco: Never Used   Substance Use Topics     Alcohol use: Yes     Alcohol/week: 1.2 oz     Types: 2 Standard drinks or equivalent per week     Comment:  Socially     Alcohol Use 12/9/2018   If you drink alcohol do you typically have greater than 3 drinks per day OR greater than 7 drinks per week? No   No flowsheet data found.    Reviewed orders with patient.  Reviewed health maintenance and updated orders accordingly - Yes  Labs reviewed in EPIC  BP Readings from Last 3 Encounters:   12/11/18 124/82   05/01/18 130/82   11/28/17 132/74    Wt Readings from Last 3 Encounters:   11/28/17 81.6 kg (180 lb)   10/25/16 81.2 kg (179 lb)   09/08/16 80.7 kg (178 lb)                  Patient Active Problem List   Diagnosis     Hypothyroidism     HSV (herpes simplex virus) infection     Depression     Glaucoma suspect     HARLEY (generalized anxiety disorder)     CHEYANNE (obstructive sleep apnea)- mild (AHI 6)     Fibromyalgia     Hyperlipidemia LDL goal <160     Situational mixed anxiety and depressive disorder     Acute seasonal allergic rhinitis due to pollen     Current mild episode of major depressive disorder without prior episode (H)     Past Surgical History:   Procedure Laterality Date     CL AFF SURGICAL PATHOLOGY  2001     COLONOSCOPY  2/21/2014    Procedure: COLONOSCOPY;  Colonoscopy, screening;  Surgeon: Micheal Webb MD;  Location: [facility] OR     HC EXCISE HAND/FOOT NEUROMA  2009, 2010    Rt Foot     HC TOOTH EXTRACTION W/FORCEP       TUBAL LIGATION  1/91       Social History     Tobacco Use     Smoking status: Never Smoker     Smokeless tobacco: Never Used   Substance Use Topics     Alcohol use: Yes     Alcohol/week: 1.2 oz     Types: 2 Standard drinks or equivalent per week     Comment: Socially     Family History   Problem Relation Age of Onset     Cancer Maternal Grandmother         lung     Cancer Mother         lung     Blood Disease Mother         nonhodgkins lymphoma     Thyroid Disease Mother         hypothyroid     Diabetes Mother         Type 2     Diabetes Father         2     Hypertension Father      C.A.D. Father         ? MI at 69     Cardiovascular Paternal  Grandfather         AAA     Breast Cancer Other      Glaucoma Paternal Grandmother      Diabetes Sister         diabetic     Cerebrovascular Disease Maternal Aunt      Macular Degeneration No family hx of          Current Outpatient Medications   Medication Sig Dispense Refill     albuterol (PROAIR HFA/PROVENTIL HFA/VENTOLIN HFA) 108 (90 Base) MCG/ACT inhaler INHALE 2 PUFFS BY MOUTH EVERY 6 HOURS AS NEEDED FOR SHORTNESS OF BREATH / DYSPNEA OR WHEEZING 1 Inhaler 3     cetirizine (ZYRTEC) 10 MG tablet TAKE 1 TABLET BY MOUTH EVERY DAY 60 tablet 3     FLUoxetine (PROZAC) 20 MG capsule Take 1 capsule (20 mg) by mouth daily 90 capsule 3     hydrOXYzine (ATARAX) 25 MG tablet Take 1 tablet (25 mg) by mouth every 8 hours as needed for anxiety 30 tablet 3     IBUPROFEN 200 MG OR TABS 1 TABLET EVERY 4 TO 6 HOURS AS NEEDED       levothyroxine (SYNTHROID/LEVOTHROID) 150 MCG tablet TAKE 1 TABLET (150 MCG) BY MOUTH DAILY 90 tablet 1     Multiple Vitamins-Minerals (MULTIVITAMIN ADULT PO) Take 1 tablet by mouth daily       Allergies   Allergen Reactions     Clindamycin Swelling     face     Sulfa Drugs Hives     Recent Labs   Lab Test 12/06/18  1757 11/28/17  1821 10/25/16  1834 10/28/15  1554  10/28/14  1925  11/21/11  1842   *  --  102*  --   --   --   --  97   HDL 61  --  53  --   --   --   --  55   TRIG 57  --   --   --   --   --   --  82   ALT  --   --   --  31  --  34  --   --    CR  --   --   --  0.73  --  0.88   < >  --    GFRESTIMATED  --   --   --  84  --  68   < >  --    GFRESTBLACK  --   --   --  >90  African American GFR Calc    --  82   < >  --    POTASSIUM  --   --   --  3.7  --  4.3   < >  --    TSH 0.46 0.43 0.45 0.51   < >  --    < > 0.60    < > = values in this interval not displayed.        Mammogram Screening: Patient over age 50, mutual decision to screen reflected in health maintenance.    Pertinent mammograms are reviewed under the imaging tab.  History of abnormal Pap smear: NO - age 30-65 PAP every 5  years with negative HPV co-testing recommended  PAP / HPV 12/23/2014 11/21/2011 9/28/2009   PAP NIL NIL NIL     Reviewed and updated as needed this visit by clinical staff  Tobacco  Allergies  Meds  Problems  Med Hx  Surg Hx  Fam Hx  Soc Hx          Reviewed and updated as needed this visit by Provider  Meds  Problems            Review of Systems   Constitutional: Negative for chills and fever.   HENT: Negative for congestion, ear pain, hearing loss and sore throat.    Eyes: Negative for pain and visual disturbance.   Respiratory: Negative for cough and shortness of breath.    Cardiovascular: Negative for chest pain, palpitations and peripheral edema.   Gastrointestinal: Negative for abdominal pain, constipation, diarrhea, heartburn, hematochezia and nausea.   Breasts:  Negative for tenderness, breast mass and discharge.   Genitourinary: Positive for pelvic pain and urgency. Negative for dysuria, frequency, genital sores, hematuria, vaginal bleeding and vaginal discharge.   Musculoskeletal: Positive for arthralgias and myalgias. Negative for joint swelling.   Skin: Positive for rash.   Neurological: Negative for dizziness, weakness, headaches and paresthesias.   Psychiatric/Behavioral: Negative for mood changes. The patient is not nervous/anxious.      CONSTITUTIONAL: NEGATIVE for fever, chills, change in weight  INTEGUMENTARY/SKIN: NEGATIVE for worrisome rashes, moles or lesions  EYES: NEGATIVE for vision changes or irritation  ENT: NEGATIVE for ear, mouth and throat problems  RESP: NEGATIVE for significant cough or SOB  BREAST: NEGATIVE for masses, tenderness or discharge  CV: NEGATIVE for chest pain, palpitations or peripheral edema  GI: NEGATIVE for nausea, abdominal pain, heartburn, or change in bowel habits  : NEGATIVE for unusual urinary or vaginal symptoms. No vaginal bleeding.  MUSCULOSKELETAL: NEGATIVE for significant arthralgias or myalgia  NEURO: NEGATIVE for weakness, dizziness or  "paresthesias  PSYCHIATRIC: NEGATIVE for changes in mood or affect      OBJECTIVE:   /82 (BP Location: Right arm, Patient Position: Chair, Cuff Size: Adult Large)   Pulse 84   Temp 98.1  F (36.7  C) (Oral)   Resp 20   Ht 1.632 m (5' 4.25\")   LMP 02/24/2014   SpO2 95%   BMI 30.66 kg/m    Physical Exam  GENERAL APPEARANCE: healthy, alert and no distress  EYES: Eyes grossly normal to inspection, PERRL and conjunctivae and sclerae normal  HENT: ear canals and TM's normal, nose and mouth without ulcers or lesions, oropharynx clear and oral mucous membranes moist  NECK: no adenopathy, no asymmetry, masses, or scars and thyroid normal to palpation  RESP: lungs clear to auscultation - no rales, rhonchi or wheezes  CV: regular rate and rhythm, normal S1 S2, no S3 or S4, no murmur, click or rub, no peripheral edema and peripheral pulses strong  ABDOMEN: soft, nontender, no hepatosplenomegaly, no masses and bowel sounds normal  MS: no musculoskeletal defects are noted and gait is age appropriate without ataxia  SKIN: no suspicious lesions or rashes  NEURO: Normal strength and tone, sensory exam grossly normal, mentation intact and speech normal  PSYCH: mentation appears normal and affect normal/bright    Diagnostic Test Results:  Results for orders placed or performed in visit on 12/06/18   Lipid panel reflex to direct LDL Fasting   Result Value Ref Range    Cholesterol 181 <200 mg/dL    Triglycerides 57 <150 mg/dL    HDL Cholesterol 61 >49 mg/dL    LDL Cholesterol Calculated 109 (H) <100 mg/dL    Non HDL Cholesterol 120 <130 mg/dL   **TSH with free T4 reflex FUTURE anytime   Result Value Ref Range    TSH 0.46 0.40 - 4.00 mU/L       ASSESSMENT/PLAN:       ICD-10-CM    1. Normal physical exam Z00.00 Stable with no significant changes. Most of her issues and concerns are due to her 's mental health issues and paranoid/controlling behaviors. He is in outpatient treatment for this but has not involved her in " "treatment.   2. HARLEY (generalized anxiety disorder) F41.1 FLUoxetine (PROZAC) 20 MG capsule     hydrOXYzine (ATARAX) 25 MG tablet- will use this for panic like attacks. I recommend mental health referral but she does not feel that she can take time away from work during the day.   3. Need for prophylactic vaccination and inoculation against influenza Z23 HC FLU VAC PRESRV FREE QUAD SPLIT VIR 3+YRS IM  [54396]          ADMIN VACCINE, FIRST [30468]     EA ADD'L VACCINE   4. Screening for diabetes mellitus Z13.1 Glucose     CANCELED: GLUCOSE   5. Current mild episode of major depressive disorder without prior episode (H) F32.0 Stable. Change in medication declined.       COUNSELING:  Reviewed preventive health counseling, as reflected in patient instructions       Regular exercise       Healthy diet/nutrition       Osteoporosis Prevention/Bone Health       Colon cancer screening    BP Readings from Last 1 Encounters:   12/11/18 124/82     Estimated body mass index is 30.66 kg/m  as calculated from the following:    Height as of this encounter: 1.632 m (5' 4.25\").    Weight as of 11/28/17: 81.6 kg (180 lb).    BP Screening:   Last 3 BP Readings:    BP Readings from Last 3 Encounters:   12/11/18 124/82   05/01/18 130/82   11/28/17 132/74       The following was recommended to the patient:  Re-screen BP within a year and recommended lifestyle modifications  Weight management plan: Discussed healthy diet and exercise guidelines     reports that  has never smoked. she has never used smokeless tobacco.      Counseling Resources:  ATP IV Guidelines  Pooled Cohorts Equation Calculator  Breast Cancer Risk Calculator  FRAX Risk Assessment  ICSI Preventive Guidelines  Dietary Guidelines for Americans, 2010  USDA's MyPlate  ASA Prophylaxis  Lung CA Screening    Makeda Bryan MD  [facility]  "

## 2018-12-11 NOTE — PATIENT INSTRUCTIONS
At [facility], we strive to deliver an exceptional experience to you, every time we see you.  If you receive a survey in the mail, please send us back your thoughts. We really do value your feedback.    Based on your medical history, these are the current health maintenance/preventive care services that you are due for (some may have been done at this visit.)  Health Maintenance Due   Topic Date Due     DTAP/TDAP/TD IMMUNIZATION (1 - Tdap) 03/29/1970     HIV SCREEN (SYSTEM ASSIGNED)  03/29/1981     PNEUMOVAX IMMUNIZATION 19-64 HIGHEST RISK (1 of 3 - PCV13) 03/29/1982     ZOSTER IMMUNIZATION (1 of 2) 03/29/2013     ADVANCE DIRECTIVE PLANNING Q5 YRS  03/29/2018     PHQ-9 Q6 MONTHS  05/28/2018     INFLUENZA VACCINE (1) 09/01/2018         Suggested websites for health information:  Www.Etece.Viddler : Up to date and easily searchable information on multiple topics.  Www.milog.gov : medication info, interactive tutorials, watch real surgeries online  Www.familydoctor.org : good info from the Academy of Family Physicians  Www.cdc.gov : public health info, travel advisories, epidemics (H1N1)  Www.aap.org : children's health info, normal development, vaccinations  Www.health.[facility].[place].us : [place] dept of health, public health issues in [place], N1N1    Your care team:                            Family Medicine Internal Medicine   MD Toby Short MD Shantel Branch-Fleming, MD Katya Georgiev PA-C Nam Ho, MD Pediatrics   JUAN Gonzalez, OPAL Ricks APRN MD Coreen Martinez MD Deborah Mielke, MD Kim Thein, APRN CNP      Clinic hours: Monday - Thursday 7 am-7 pm; Fridays 7 am-5 pm.   Urgent care: Monday - Friday 11 am-9 pm; Saturday and Sunday 9 am-5 pm.  Pharmacy : Monday -Thursday 8 am-8 pm; Friday 8 am-6 pm; Saturday and Sunday 9 am-5 pm.     Clinic: [phone]   Pharmacy: [phone]    Preventive Health  Recommendations  Female Ages 50 - 64    Yearly exam: See your health care provider every year in order to  o Review health changes.   o Discuss preventive care.    o Review your medicines if your doctor has prescribed any.      Get a Pap test every three years (unless you have an abnormal result and your provider advises testing more often).    If you get Pap tests with HPV test, you only need to test every 5 years, unless you have an abnormal result.     You do not need a Pap test if your uterus was removed (hysterectomy) and you have not had cancer.    You should be tested each year for STDs (sexually transmitted diseases) if you're at risk.     Have a mammogram every 1 to 2 years.    Have a colonoscopy at age 50, or have a yearly FIT test (stool test). These exams screen for colon cancer.      Have a cholesterol test every 5 years, or more often if advised.    Have a diabetes test (fasting glucose) every three years. If you are at risk for diabetes, you should have this test more often.     If you are at risk for osteoporosis (brittle bone disease), think about having a bone density scan (DEXA).    Shots: Get a flu shot each year. Get a tetanus shot every 10 years.    Nutrition:     Eat at least 5 servings of fruits and vegetables each day.    Eat whole-grain bread, whole-wheat pasta and brown rice instead of white grains and rice.    Get adequate Calcium and Vitamin D.     Lifestyle    Exercise at least 150 minutes a week (30 minutes a day, 5 days a week). This will help you control your weight and prevent disease.    Limit alcohol to one drink per day.    No smoking.     Wear sunscreen to prevent skin cancer.     See your dentist every six months for an exam and cleaning.    See your eye doctor every 1 to 2 years.

## 2018-12-12 ASSESSMENT — ANXIETY QUESTIONNAIRES: GAD7 TOTAL SCORE: 6

## 2019-02-19 ENCOUNTER — OFFICE VISIT (OUTPATIENT)
Dept: FAMILY MEDICINE | Facility: CLINIC | Age: 54
End: 2019-02-19
Payer: COMMERCIAL

## 2019-02-19 VITALS
SYSTOLIC BLOOD PRESSURE: 138 MMHG | HEIGHT: 64 IN | OXYGEN SATURATION: 99 % | TEMPERATURE: 98.1 F | DIASTOLIC BLOOD PRESSURE: 80 MMHG | RESPIRATION RATE: 18 BRPM | BODY MASS INDEX: 30.66 KG/M2 | HEART RATE: 71 BPM

## 2019-02-19 DIAGNOSIS — M79.7 FIBROMYALGIA: ICD-10-CM

## 2019-02-19 DIAGNOSIS — R10.9 FLANK PAIN: ICD-10-CM

## 2019-02-19 DIAGNOSIS — F41.1 GAD (GENERALIZED ANXIETY DISORDER): ICD-10-CM

## 2019-02-19 DIAGNOSIS — R53.82 CHRONIC FATIGUE: ICD-10-CM

## 2019-02-19 DIAGNOSIS — E03.4 HYPOTHYROIDISM DUE TO ACQUIRED ATROPHY OF THYROID: ICD-10-CM

## 2019-02-19 DIAGNOSIS — K21.00 GASTROESOPHAGEAL REFLUX DISEASE WITH ESOPHAGITIS: ICD-10-CM

## 2019-02-19 DIAGNOSIS — F32.0 CURRENT MILD EPISODE OF MAJOR DEPRESSIVE DISORDER WITHOUT PRIOR EPISODE (H): ICD-10-CM

## 2019-02-19 DIAGNOSIS — R00.2 PALPITATIONS: Primary | ICD-10-CM

## 2019-02-19 LAB
ALBUMIN UR-MCNC: NEGATIVE MG/DL
APPEARANCE UR: CLEAR
BILIRUB UR QL STRIP: NEGATIVE
COLOR UR AUTO: YELLOW
ERYTHROCYTE [DISTWIDTH] IN BLOOD BY AUTOMATED COUNT: 12.2 % (ref 10–15)
GLUCOSE UR STRIP-MCNC: NEGATIVE MG/DL
HCT VFR BLD AUTO: 35.9 % (ref 35–47)
HGB BLD-MCNC: 11.9 G/DL (ref 11.7–15.7)
HGB UR QL STRIP: NEGATIVE
KETONES UR STRIP-MCNC: NEGATIVE MG/DL
LEUKOCYTE ESTERASE UR QL STRIP: NEGATIVE
MCH RBC QN AUTO: 30.2 PG (ref 26.5–33)
MCHC RBC AUTO-ENTMCNC: 33.1 G/DL (ref 31.5–36.5)
MCV RBC AUTO: 91 FL (ref 78–100)
NITRATE UR QL: NEGATIVE
PH UR STRIP: 6.5 PH (ref 5–7)
PLATELET # BLD AUTO: 264 10E9/L (ref 150–450)
RBC # BLD AUTO: 3.94 10E12/L (ref 3.8–5.2)
SOURCE: NORMAL
SP GR UR STRIP: <=1.005 (ref 1–1.03)
UROBILINOGEN UR STRIP-ACNC: 0.2 EU/DL (ref 0.2–1)
WBC # BLD AUTO: 3.9 10E9/L (ref 4–11)

## 2019-02-19 PROCEDURE — 80069 RENAL FUNCTION PANEL: CPT | Performed by: FAMILY MEDICINE

## 2019-02-19 PROCEDURE — 36415 COLL VENOUS BLD VENIPUNCTURE: CPT | Performed by: FAMILY MEDICINE

## 2019-02-19 PROCEDURE — 93000 ELECTROCARDIOGRAM COMPLETE: CPT | Performed by: FAMILY MEDICINE

## 2019-02-19 PROCEDURE — 85027 COMPLETE CBC AUTOMATED: CPT | Performed by: FAMILY MEDICINE

## 2019-02-19 PROCEDURE — 84443 ASSAY THYROID STIM HORMONE: CPT | Performed by: FAMILY MEDICINE

## 2019-02-19 PROCEDURE — 81003 URINALYSIS AUTO W/O SCOPE: CPT | Performed by: FAMILY MEDICINE

## 2019-02-19 PROCEDURE — 99214 OFFICE O/P EST MOD 30 MIN: CPT | Performed by: FAMILY MEDICINE

## 2019-02-19 ASSESSMENT — PAIN SCALES - GENERAL: PAINLEVEL: MODERATE PAIN (4)

## 2019-02-20 LAB
ALBUMIN SERPL-MCNC: 4 G/DL (ref 3.4–5)
ANION GAP SERPL CALCULATED.3IONS-SCNC: 7 MMOL/L (ref 3–14)
BUN SERPL-MCNC: 17 MG/DL (ref 7–30)
CALCIUM SERPL-MCNC: 9 MG/DL (ref 8.5–10.1)
CHLORIDE SERPL-SCNC: 106 MMOL/L (ref 94–109)
CO2 SERPL-SCNC: 28 MMOL/L (ref 20–32)
CREAT SERPL-MCNC: 0.78 MG/DL (ref 0.52–1.04)
GFR SERPL CREATININE-BSD FRML MDRD: 84 ML/MIN/{1.73_M2}
GLUCOSE SERPL-MCNC: 86 MG/DL (ref 70–99)
PHOSPHATE SERPL-MCNC: 4.2 MG/DL (ref 2.5–4.5)
POTASSIUM SERPL-SCNC: 3.9 MMOL/L (ref 3.4–5.3)
SODIUM SERPL-SCNC: 141 MMOL/L (ref 133–144)
TSH SERPL DL<=0.005 MIU/L-ACNC: 1.68 MU/L (ref 0.4–4)

## 2019-02-20 NOTE — PROGRESS NOTES
SUBJECTIVE:   Aishwarya Savage is a 53 year old female who presents to clinic today for the following health issues:    Acute Illness- thick mucous.    Acute illness concerns: sinus infection  Onset: a month or so     Fever: no    Chills/Sweats: YES    Headache (location?): YES    Sinus Pressure:YES    Conjunctivitis:  no    Ear Pain: no    Rhinorrhea: YES    Congestion: no    Sore Throat: no     Cough: no    Wheeze: no    Decreased Appetite: no    Nausea: no    Vomiting: no    Diarrhea:  no    Dysuria/Freq.: YES- freq     Fatigue/Achiness: YES    Sick/Strep Exposure: no     Therapies Tried and outcome: Ibuprofen; little relief     CHEST PAIN- stopped drinking coffee decaf.      Onset: 2-3 weeks    Description:   Location:  entire chest  Character: burning  Radiation: back and stomach   Duration: comes on at night a lot and sometimes during the day     Intensity: moderate    Progression of Symptoms:  same    Accompanying Signs & Symptoms:  Shortness of breath: no  Sweating: YES- a little bit but could be related to being sick  Nausea/vomiting: no  Lightheadedness: YES- was painting so could be that  Palpitations: YES  Fever/Chills: no  Cough: no  Heartburn: YES    History:   Family history of heart disease YES- father   Tobacco use: no    Precipitating factors:   Worse with exertion: no- but feels good   Worse with deep breaths :  no  Related to food: YES- thought it was coffee so stopped taking it and got better but still it present    Alleviating factors:  Stress at home. Patient stated she cant tell if its her stomach, back, or chest       Therapies Tried and outcome: Antacids     Depression and Anxiety Follow-Up    Status since last visit: No change    Other associated symptoms:None    Complicating factors:     Significant life event: No     Current substance abuse: None    PHQ 10/11/2016 11/30/2017 12/11/2018   PHQ-9 Total Score 11 6 6   Q9: Suicide Ideation Several days Not at all Not at all     HARLEY-7 SCORE  10/11/2016 11/30/2017 12/11/2018   Total Score 2 1 6       PHQ-9  English  PHQ-9   Any Language  HARLEY-7  Suicide Assessment Five-step Evaluation and Treatment (SAFE-T)  Hypothyroidism Follow-up      Since last visit, patient describes the following symptoms: Weight stable, no hair loss, no skin changes, no constipation, no loose stools      Problem list and histories reviewed & adjusted, as indicated.  Additional history: as documented    Patient Active Problem List   Diagnosis     Hypothyroidism     HSV (herpes simplex virus) infection     Depression     Glaucoma suspect     HARLEY (generalized anxiety disorder)     CHEYANNE (obstructive sleep apnea)- mild (AHI 6)     Fibromyalgia     Hyperlipidemia LDL goal <160     Situational mixed anxiety and depressive disorder     Acute seasonal allergic rhinitis due to pollen     Current mild episode of major depressive disorder without prior episode (H)     Past Surgical History:   Procedure Laterality Date     CL AFF SURGICAL PATHOLOGY  2001     COLONOSCOPY  2/21/2014    Procedure: COLONOSCOPY;  Colonoscopy, screening;  Surgeon: Micheal Webb MD;  Location: [facility]     HC EXCISE HAND/FOOT NEUROMA  2009, 2010    Rt Foot     HC TOOTH EXTRACTION W/FORCEP       TUBAL LIGATION  1/91       Social History     Tobacco Use     Smoking status: Never Smoker     Smokeless tobacco: Never Used   Substance Use Topics     Alcohol use: Yes     Alcohol/week: 1.2 oz     Types: 2 Standard drinks or equivalent per week     Comment: Socially     Family History   Problem Relation Age of Onset     Cancer Maternal Grandmother         lung     Cancer Mother         lung     Blood Disease Mother         nonhodgkins lymphoma     Thyroid Disease Mother         hypothyroid     Diabetes Mother         Type 2     Diabetes Father         2     Hypertension Father      C.A.D. Father         ? MI at 69     Cardiovascular Paternal Grandfather         AAA     Breast Cancer Other      Glaucoma Paternal Grandmother       Diabetes Sister         diabetic     Cerebrovascular Disease Maternal Aunt      Macular Degeneration No family hx of          Current Outpatient Medications   Medication Sig Dispense Refill     albuterol (PROAIR HFA/PROVENTIL HFA/VENTOLIN HFA) 108 (90 Base) MCG/ACT inhaler INHALE 2 PUFFS BY MOUTH EVERY 6 HOURS AS NEEDED FOR SHORTNESS OF BREATH / DYSPNEA OR WHEEZING 1 Inhaler 3     cetirizine (ZYRTEC) 10 MG tablet TAKE 1 TABLET BY MOUTH EVERY DAY 60 tablet 3     FLUoxetine (PROZAC) 20 MG capsule Take 1 capsule (20 mg) by mouth daily 90 capsule 3     IBUPROFEN 200 MG OR TABS 1 TABLET EVERY 4 TO 6 HOURS AS NEEDED       levothyroxine (SYNTHROID/LEVOTHROID) 150 MCG tablet TAKE 1 TABLET (150 MCG) BY MOUTH DAILY 90 tablet 1     Multiple Vitamins-Minerals (MULTIVITAMIN ADULT PO) Take 1 tablet by mouth daily       omeprazole (PRILOSEC) 20 MG DR capsule Take 1 capsule (20 mg) by mouth daily 30 capsule 3     Allergies   Allergen Reactions     Clindamycin Swelling     face     Sulfa Drugs Hives     Recent Labs   Lab Test 12/06/18  1757 11/28/17  1821 10/25/16  1834 10/28/15  1554  10/28/14  1925  11/21/11  1842   *  --  102*  --   --   --   --  97   HDL 61  --  53  --   --   --   --  55   TRIG 57  --   --   --   --   --   --  82   ALT  --   --   --  31  --  34  --   --    CR  --   --   --  0.73  --  0.88   < >  --    GFRESTIMATED  --   --   --  84  --  68   < >  --    GFRESTBLACK  --   --   --  >90  African American GFR Calc    --  82   < >  --    POTASSIUM  --   --   --  3.7  --  4.3   < >  --    TSH 0.46 0.43 0.45 0.51   < >  --    < > 0.60    < > = values in this interval not displayed.      BP Readings from Last 3 Encounters:   02/19/19 138/80   12/11/18 124/82   05/01/18 130/82    Wt Readings from Last 3 Encounters:   11/28/17 81.6 kg (180 lb)   10/25/16 81.2 kg (179 lb)   09/08/16 80.7 kg (178 lb)                  Labs reviewed in EPIC    Reviewed and updated as needed this visit by clinical staff  Tobacco   "Allergies  Meds  Med Hx  Surg Hx  Fam Hx  Soc Hx      Reviewed and updated as needed this visit by Provider  Tobacco         ROS:  Constitutional, HEENT, cardiovascular, pulmonary, gi and gu systems are negative, except as otherwise noted.    OBJECTIVE:     /80 (BP Location: Right arm, Patient Position: Sitting, Cuff Size: Adult Large)   Pulse 71   Temp 98.1  F (36.7  C) (Oral)   Resp 18   Ht 1.632 m (5' 4.25\")   LMP 02/24/2014   SpO2 99%   Breastfeeding? No   BMI 30.66 kg/m    Body mass index is 30.66 kg/m .  GENERAL: healthy, alert, well nourished, well hydrated, no distress, obese  HENT: ear canals- normal; TMs- normal; Nose- normal; Mouth- no ulcers, no lesions, throat is clear with no erythema or exudate.   NECK: no tenderness, no adenopathy, no asymmetry, no masses, no stiffness; thyroid- normal to palpation  RESP: lungs clear to auscultation - no rales, no rhonchi, no wheezes  CV: regular rates and rhythm, normal S1 S2, no S3 or S4 and no murmur, no click or rub -  ABDOMEN: soft, no tenderness, no  hepatosplenomegaly, no masses, normal bowel sounds  MS: extremities- no gross deformities noted, no edema  SKIN: no suspicious lesions, no rashes  NEURO: strength and tone- normal, sensory exam- grossly normal, mentation- intact, speech- normal, reflexes- symmetric  BACK: right CVA tenderness, no paralumbar tenderness  PSYCH: Alert and oriented times 3; speech- coherent , normal rate and volume; able to articulate logical thoughts, able to abstract reason, no tangential thoughts, no hallucinations or delusions, affect- normal but anxious. Here with her  and seems annoyed with him.    Diagnostic Test Results:  Results for orders placed or performed in visit on 02/19/19 (from the past 24 hour(s))   CBC with platelets   Result Value Ref Range    WBC 3.9 (L) 4.0 - 11.0 10e9/L    RBC Count 3.94 3.8 - 5.2 10e12/L    Hemoglobin 11.9 11.7 - 15.7 g/dL    Hematocrit 35.9 35.0 - 47.0 %    MCV 91 78 " "- 100 fl    MCH 30.2 26.5 - 33.0 pg    MCHC 33.1 31.5 - 36.5 g/dL    RDW 12.2 10.0 - 15.0 %    Platelet Count 264 150 - 450 10e9/L   UA reflex to Microscopic and Culture   Result Value Ref Range    Color Urine Yellow     Appearance Urine Clear     Glucose Urine Negative NEG^Negative mg/dL    Bilirubin Urine Negative NEG^Negative    Ketones Urine Negative NEG^Negative mg/dL    Specific Gravity Urine <=1.005 1.003 - 1.035    Blood Urine Negative NEG^Negative    pH Urine 6.5 5.0 - 7.0 pH    Protein Albumin Urine Negative NEG^Negative mg/dL    Urobilinogen Urine 0.2 0.2 - 1.0 EU/dL    Nitrite Urine Negative NEG^Negative    Leukocyte Esterase Urine Negative NEG^Negative    Source Midstream Urine        ASSESSMENT/PLAN:         Tobacco Cessation:   reports that  has never smoked. she has never used smokeless tobacco.      BMI:   Estimated body mass index is 30.66 kg/m  as calculated from the following:    Height as of this encounter: 1.632 m (5' 4.25\").    Weight as of 11/28/17: 81.6 kg (180 lb).   Weight management plan: Discussed healthy diet and exercise guidelines        ICD-10-CM    1. Palpitations- no signs of arrhythmia  R00.2 EKG 12-lead complete w/read - Clinics   2. Flank pain- urine is clear R10.9 Renal panel     CBC with platelets     UA reflex to Microscopic and Culture   3. Current mild episode of major depressive disorder without prior episode (H) F32.0 Problems with sleeping and anxiety worse in winter. Discussed daylight bulbs.    4. Chronic fatigue R53.82 Gets tired after working.    5. Hypothyroidism due to acquired atrophy of thyroid E03.4 TSH with free T4 reflex   6. HARLEY (generalized anxiety disorder) F41.1 Anxiety is worse at night. Hydroxyzine did not help and wants something stronger.    7. Fibromyalgia M79.7 Diffuse pain   8. Gastroesophageal reflux disease with esophagitis K21.0 omeprazole (PRILOSEC) 20 MG DR capsule daily for heart burn symptoms. Take instead of Tums or Zantac. "       CONSULTATION/REFERRAL to mental health for anxiety related issues  FUTURE LABS:       - Schedule fasting labs in 6 months  FUTURE APPOINTMENTS:       - Follow-up visit in 1-2 months or sooner if any questions or concerns.   Work on weight loss  Regular exercise  See Patient Instructions    Makeda Bryan MD  [facility]

## 2019-02-20 NOTE — RESULT ENCOUNTER NOTE
Dear Aishwarya    Your test results are attached. I am happy to let you know that they are stable.    The blood sugar is normal and you do not have diabetes. The kidneys are healthy. The thyroid test is normal. The hemoglobin is normal and you do not have anemia. The urine does not show any infection. There are no abnormalities that explain your symptoms. Please let me know if the omeprazole helps with the stomach symptoms at night.     Please contact me by [facility] if you have any questions about your labs or management.    Makeda Bryan MD

## 2019-03-09 ENCOUNTER — ANCILLARY PROCEDURE (OUTPATIENT)
Dept: MAMMOGRAPHY | Facility: CLINIC | Age: 54
End: 2019-03-09
Payer: COMMERCIAL

## 2019-03-09 DIAGNOSIS — Z12.31 VISIT FOR SCREENING MAMMOGRAM: ICD-10-CM

## 2019-03-09 PROCEDURE — 77067 SCR MAMMO BI INCL CAD: CPT | Mod: TC

## 2019-03-09 NOTE — LETTER
March 12, 2019      Aishwarya Savage  [address] [place] [address]        Dear Aishwarya Savage    I am happy to let you know that your mammogram was normal. You may schedule a follow up mammogram in 1-2 years depending on your risk factors and family history. Please let me know if you have any questions about your results. You should be receiving a letter from the radiologist.      Enclosed is a copy of the results.  It was a pleasure to see you at your last appointment.      Sincerely,      Makeda Bryan MD, MPH /PHILLIP So MA      Results for orders placed or performed in visit on 03/09/19   MA Screening Digital Bilateral    Narrative    SCREENING MAMMOGRAM, BILATERAL, DIGITAL w/CAD - 3/9/2019 10:51 AM    BREAST SYMPTOMS: No current breast complaints.     COMPARISON:  12/16/2017, 11/04/2015, 01/05/2013, 11/26/2011.    BREAST DENSITY: Scattered fibroglandular densities.    COMMENTS: No findings of suspicion for malignancy.       Impression    IMPRESSION: BI-RADS CATEGORY: 1 -  Negative    RECOMMENDED FOLLOW-UP: Annual Mammography.    Exam results letter mailed to patient.      HARSHAD URIBE MD

## 2019-04-12 ENCOUNTER — MYC MEDICAL ADVICE (OUTPATIENT)
Dept: FAMILY MEDICINE | Facility: CLINIC | Age: 54
End: 2019-04-12

## 2019-04-23 ENCOUNTER — ANCILLARY PROCEDURE (OUTPATIENT)
Dept: ULTRASOUND IMAGING | Facility: CLINIC | Age: 54
End: 2019-04-23
Attending: FAMILY MEDICINE
Payer: COMMERCIAL

## 2019-04-23 DIAGNOSIS — N83.209 CYST OF OVARY, UNSPECIFIED LATERALITY: ICD-10-CM

## 2019-04-23 PROCEDURE — 76856 US EXAM PELVIC COMPLETE: CPT | Mod: 59

## 2019-04-23 PROCEDURE — 76830 TRANSVAGINAL US NON-OB: CPT

## 2019-04-26 NOTE — RESULT ENCOUNTER NOTE
Dear Aishwarya    Your test results are attached. I am happy to let you know that they are stable.    The left ovarian cyst has not changed. The endometrium or lining to the uterus is a little thick but this also is the same. No treatment is needed.     Please contact me by MyChart if you have any questions about your labs or management.    Makeda Bryan MD

## 2019-06-11 ENCOUNTER — OFFICE VISIT (OUTPATIENT)
Dept: FAMILY MEDICINE | Facility: CLINIC | Age: 54
End: 2019-06-11
Payer: COMMERCIAL

## 2019-06-11 VITALS
TEMPERATURE: 98.2 F | HEIGHT: 64 IN | OXYGEN SATURATION: 94 % | SYSTOLIC BLOOD PRESSURE: 138 MMHG | HEART RATE: 74 BPM | RESPIRATION RATE: 18 BRPM | DIASTOLIC BLOOD PRESSURE: 76 MMHG | BODY MASS INDEX: 30.66 KG/M2

## 2019-06-11 DIAGNOSIS — G44.209 TENSION HEADACHE: ICD-10-CM

## 2019-06-11 DIAGNOSIS — S16.1XXA STRAIN OF NECK MUSCLE, INITIAL ENCOUNTER: Primary | ICD-10-CM

## 2019-06-11 PROCEDURE — 99214 OFFICE O/P EST MOD 30 MIN: CPT | Performed by: PHYSICIAN ASSISTANT

## 2019-06-11 RX ORDER — DICLOFENAC SODIUM 75 MG/1
75 TABLET, DELAYED RELEASE ORAL 3 TIMES DAILY PRN
Qty: 30 TABLET | Refills: 1 | Status: SHIPPED | OUTPATIENT
Start: 2019-06-11 | End: 2019-07-08

## 2019-06-11 RX ORDER — METHOCARBAMOL 750 MG/1
750 TABLET, FILM COATED ORAL 4 TIMES DAILY PRN
Qty: 40 TABLET | Refills: 1 | Status: SHIPPED | OUTPATIENT
Start: 2019-06-11 | End: 2019-12-16

## 2019-06-11 ASSESSMENT — PAIN SCALES - GENERAL: PAINLEVEL: SEVERE PAIN (7)

## 2019-06-11 NOTE — PROGRESS NOTES
Subjective     Aishwarya Savage is a 54 year old female who presents to clinic today for the following health issues:    HPI   Neck Pain  Onset: 2 weeks     Description:   Location:  base of skull , neck, shoulder  Radiation: to the back of the head, into eyes, and both shoulders     Intensity: severe, but not moderate patient thinks sheds just getting used to it- patient states she can hear it snapping or cracking     Progression of Symptoms:  improving    Accompanying Signs & Symptoms:  Burning, prickly sensation (paresthesias) in arm(s): no   Numbness in arm(s): no   Weakness in arm(s):  no   Fever: no   Headache: YES  Nausea and/or vomiting: no   Blurred vision: YES   Jaw kept locking open     History:   Trauma: YES, patient did a fast jarring movement with her neck and the next day pain started  Previous neck pain: YES- but worse now   Previous surgery or injections: no   Previous Imaging (MRI,X ray): YES- 2017    Precipitating factors:   Does movement increase the pain:  YES    Alleviating factors:  Patient was dreaming about something that had grabbed her and then felt the pain when waking up. Patient states it feels like she hit her funny bone but in the back of her neck and would go up towards the top of her head     Therapies Tried and outcome:  ibuprofen and ice- some relief        Patient Active Problem List   Diagnosis     Hypothyroidism     HSV (herpes simplex virus) infection     Depression     Glaucoma suspect     HARLEY (generalized anxiety disorder)     CHEYANNE (obstructive sleep apnea)- mild (AHI 6)     Fibromyalgia     Hyperlipidemia LDL goal <160     Situational mixed anxiety and depressive disorder     Acute seasonal allergic rhinitis due to pollen     Current mild episode of major depressive disorder without prior episode (H)     Past Surgical History:   Procedure Laterality Date     CL AFF SURGICAL PATHOLOGY  2001     COLONOSCOPY  2/21/2014    Procedure: COLONOSCOPY;  Colonoscopy, screening;   Surgeon: Micheal Webb MD;  Location: [facility] OR     HC EXCISE HAND/FOOT NEUROMA  2009, 2010    Rt Foot     HC TOOTH EXTRACTION W/FORCEP       TUBAL LIGATION  1/91       Social History     Tobacco Use     Smoking status: Never Smoker     Smokeless tobacco: Never Used   Substance Use Topics     Alcohol use: Yes     Alcohol/week: 1.2 oz     Types: 2 Standard drinks or equivalent per week     Comment: Socially     Family History   Problem Relation Age of Onset     Cancer Maternal Grandmother         lung     Cancer Mother         lung     Blood Disease Mother         nonhodgkins lymphoma     Thyroid Disease Mother         hypothyroid     Diabetes Mother         Type 2     Diabetes Father         2     Hypertension Father      C.A.D. Father         ? MI at 69     Cardiovascular Paternal Grandfather         AAA     Breast Cancer Other      Glaucoma Paternal Grandmother      Diabetes Sister         diabetic     Cerebrovascular Disease Maternal Aunt      Macular Degeneration No family hx of          Current Outpatient Medications   Medication Sig Dispense Refill     diclofenac (VOLTAREN) 75 MG EC tablet Take 1 tablet (75 mg) by mouth 3 times daily as needed for moderate pain 30 tablet 1     methocarbamol (ROBAXIN) 750 MG tablet Take 1 tablet (750 mg) by mouth 4 times daily as needed for muscle spasms 40 tablet 1     albuterol (PROAIR HFA/PROVENTIL HFA/VENTOLIN HFA) 108 (90 Base) MCG/ACT inhaler INHALE 2 PUFFS BY MOUTH EVERY 6 HOURS AS NEEDED FOR SHORTNESS OF BREATH / DYSPNEA OR WHEEZING 1 Inhaler 3     cetirizine (ZYRTEC) 10 MG tablet TAKE 1 TABLET BY MOUTH EVERY DAY 60 tablet 3     FLUoxetine (PROZAC) 20 MG capsule Take 1 capsule (20 mg) by mouth daily 90 capsule 3     IBUPROFEN 200 MG OR TABS 1 TABLET EVERY 4 TO 6 HOURS AS NEEDED       levothyroxine (SYNTHROID/LEVOTHROID) 150 MCG tablet TAKE 1 TABLET (150 MCG) BY MOUTH DAILY 90 tablet 1     Multiple Vitamins-Minerals (MULTIVITAMIN ADULT PO) Take 1 tablet by mouth daily    "    omeprazole (PRILOSEC) 20 MG DR capsule Take 1 capsule (20 mg) by mouth daily 30 capsule 3     Allergies   Allergen Reactions     Clindamycin Swelling     face     Sulfa Drugs Hives         Reviewed and updated as needed this visit by Provider  Tobacco  Allergies  Meds  Problems  Med Hx  Surg Hx  Fam Hx         Review of Systems   ROS COMP: Constitutional, HEENT, cardiovascular, pulmonary, gi and gu systems are negative, except as otherwise noted.      Objective    /76 (BP Location: Right arm, Patient Position: Sitting, Cuff Size: Adult Large)   Pulse 74   Temp 98.2  F (36.8  C) (Oral)   Resp 18   Ht 1.632 m (5' 4.25\")   LMP 02/24/2014   SpO2 94%   Breastfeeding? No   BMI 30.66 kg/m    Body mass index is 30.66 kg/m .  Physical Exam   GENERAL: healthy, alert and no distress  EYES: Eyes grossly normal to inspection, PERRL and conjunctivae and sclerae normal  HENT: ear canals and TM's normal, nose and mouth without ulcers or lesions  NECK: no adenopathy, no asymmetry, masses, or scars and thyroid normal to palpation  RESP: lungs clear to auscultation - no rales, rhonchi or wheezes  MS: RUE, LUE exam shows normal strength and muscle mass, no deformities, no erythema, induration, or nodules, no evidence of joint effusion, ROM of all joints is normal and no evidence of joint instability,    neck exam shows normal strength, noted torticollis and ROM is limited with flexion/ extention  SKIN: no suspicious lesions or rashes  NEURO: Normal strength and tone, sensory exam grossly normal, mentation intact, oriented times 3, speech normal, cranial nerves 2-12 intact, DTR's normal and symmetric +2 and gait normal including heel/toe/tandem walking    Diagnostic Test Results:  Labs reviewed in Epic  none       Assessment & Plan       ICD-10-CM    1. Strain of neck muscle, initial encounter S16.1XXA diclofenac (VOLTAREN) 75 MG EC tablet     methocarbamol (ROBAXIN) 750 MG tablet   2. Tension headache G44.209 " diclofenac (VOLTAREN) 75 MG EC tablet     methocarbamol (ROBAXIN) 750 MG tablet      Diclofenac 75 mg three times a day as needed   Robaxin 750 mg three times a day as needed   Massages  Apply warm packs   Daily stretches        Return in about 2 weeks (around 6/25/2019), or if symptoms worsen or fail to improve.    Joya Wheeler PA-C  [facility]

## 2019-06-12 NOTE — PATIENT INSTRUCTIONS
Patient Education     Tension Headache    A muscle tension headache is a very common cause of head pain. It s also called a stress headache. When some people are under stress, they tense the muscles of their shoulder, neck, and scalp without knowing it. If this tension lasts long enough, a headache can occur. A tension headache can be quite painful. It can last for hours or even days.  Home care  Follow these tips when caring for yourself at home:    Don t drive yourself home if you were given pain medicine for your headache. Instead, have someone else drive you home. Try to sleep when you get home. You should feel much better when you wake up.    Put heat on the back of your neck to help ease neck spasm.    Drink only clear liquids or eat a light diet until your symptoms get better. This will help you avoid nausea or vomiting.  How to prevent headaches    Figure out what is causing stress in your life. Learn new ways to handle your stress. Ideas include regular exercise, biofeedback, self-hypnosis, yoga, and meditation. Talk with your healthcare provider to find out more information about managing stress. Many books and digital media are also available on this subject.    Take time out at the first sign of a tension headache, if possible. Take yourself out of the stressful situation. Find a quiet, comfortable place to sit or lie down and let yourself relax. Heat and deep massage of the tight areas in the neck and shoulders may help ease muscle spasm. You may also get relief from a medicine like ibuprofen or a prescribed muscle relaxant.  Follow-up care  Follow up with your healthcare provider, or as advised. Talk with your provider if you have frequent headaches. He or she can figure out a treatment plan. Ask if you can have medicine to take at home the next time you get a bad headache. This may keep you from having to visit the emergency department in the future. You may need to see a headache specialist  (neurologist) if you continue to have headaches.  When to seek medical advice  Call your healthcare provider right away if any of these occur:    Your head pain gets worse during sexual intercourse or strenuous activity    Your head pain doesn t get better within 24 hours    You aren t able to keep liquids down (repeated vomiting)    Fever of 100.4 F (38 C) or higher, or as directed by your healthcare provider    Stiff neck    Extreme drowsiness, confusion, or fainting    Dizziness or dizziness with spinning sensation (vertigo)    Weakness in an arm or leg or one side of your face    You have difficulty speaking    Your vision changes  Date Last Reviewed: 8/1/2016 2000-2018 [employer]. [address] [place] [address]. All rights reserved. This information is not intended as a substitute for professional medical care. Always follow your healthcare professional's instructions.           Patient Education     Neck Exercises: Head Lifts    Do this exercise on your hands and knees. Keep your knees under your hips and your hands under your shoulders. Keep your spine in a neutral position (not arched or sagging). Keep your ears in line with your shoulders. Hold for a few seconds before starting the exercise:    Keeping your back straight, slowly drop your chin toward your chest. Tuck in your chin.    Hold for 5 seconds. Then lift your head until your neck is level with your back.    Hold for 5 seconds. Repeat 5 to10 times.  Date Last Reviewed: 3/1/2018    [id number] [employer]. [address] [place] [address]. All rights reserved. This information is not intended as a substitute for professional medical care. Always follow your healthcare professional's instructions.           Patient Education     Shoulder Shrug Exercise    To start, sit in a chair with your feet flat on the floor. Shift your weight slightly forward to avoid rounding your back. Relax. Keep your ears,  shoulders, and hips aligned:    Raise both of your shoulders as high as you can, as if you were trying to touch them to your ears. Keep your head and neck still and relaxed.    Hold for a count of 10. Release.    Repeat 5 times.  For your safety, check with your healthcare provider before starting an exercise program.   Date Last Reviewed: 11/1/2017 2000-2018 [employer]. [address] [place] [address]. All rights reserved. This information is not intended as a substitute for professional medical care. Always follow your healthcare professional's instructions.           Patient Education     Exercises: Neck Isometrics  To start, sit in a chair with your feet flat on the floor. Your weight should be slightly forward so that you re balanced evenly on your buttocks. Relax your shoulders and keep your head level. Using a chair with arms may help you keep your balance.       1. Press your palm against your forehead. Resist with your neck muscles. Hold for 10 seconds. Relax. Repeat 5 times.  2. Do the exercise again, pressing on the side of your head. Repeat 5 times. Switch sides.  3. Do the exercise again, pressing on the back of your head. Repeat 5 times.  For your safety, check with your healthcare provider before starting an exercise program.   Date Last Reviewed: 11/1/2017 2000-2018 [employer]. [address] [place] [address]. All rights reserved. This information is not intended as a substitute for professional medical care. Always follow your healthcare professional's instructions.

## 2019-07-05 ENCOUNTER — TELEPHONE (OUTPATIENT)
Dept: ORTHOPEDICS | Facility: CLINIC | Age: 54
End: 2019-07-05

## 2019-07-05 NOTE — TELEPHONE ENCOUNTER
Called and talked with patient, she would like an appointment here. Appointment made with Dr. Escoto for Monday at 1:40pm  Rekha Mayers RN

## 2019-07-05 NOTE — TELEPHONE ENCOUNTER
[facility] Call Center    Phone Message    May a detailed message be left on voicemail: yes    Reason for Call: Other: Patient needs an appt 07/08/2019 to be seen for foot fracture. Please advise.     Action Taken: Message routed to:  Adult Clinics: Sports Medicine p [phone]

## 2019-07-08 ENCOUNTER — OFFICE VISIT (OUTPATIENT)
Dept: ORTHOPEDICS | Facility: CLINIC | Age: 54
End: 2019-07-08
Payer: COMMERCIAL

## 2019-07-08 ENCOUNTER — OFFICE VISIT (OUTPATIENT)
Dept: FAMILY MEDICINE | Facility: CLINIC | Age: 54
End: 2019-07-08
Payer: COMMERCIAL

## 2019-07-08 VITALS
HEART RATE: 85 BPM | TEMPERATURE: 97.8 F | SYSTOLIC BLOOD PRESSURE: 142 MMHG | DIASTOLIC BLOOD PRESSURE: 80 MMHG | OXYGEN SATURATION: 99 %

## 2019-07-08 VITALS
DIASTOLIC BLOOD PRESSURE: 77 MMHG | HEART RATE: 78 BPM | BODY MASS INDEX: 30.66 KG/M2 | HEIGHT: 64 IN | SYSTOLIC BLOOD PRESSURE: 148 MMHG

## 2019-07-08 DIAGNOSIS — S82.899A CLOSED FRACTURE OF ANKLE, UNSPECIFIED LATERALITY, INITIAL ENCOUNTER: Primary | ICD-10-CM

## 2019-07-08 DIAGNOSIS — Z01.818 PREOP GENERAL PHYSICAL EXAM: Primary | ICD-10-CM

## 2019-07-08 DIAGNOSIS — S82.842A CLOSED BIMALLEOLAR FRACTURE OF LEFT ANKLE, INITIAL ENCOUNTER: ICD-10-CM

## 2019-07-08 DIAGNOSIS — S82.842A CLOSED BIMALLEOLAR FRACTURE OF LEFT ANKLE: Primary | ICD-10-CM

## 2019-07-08 PROCEDURE — 99214 OFFICE O/P EST MOD 30 MIN: CPT | Performed by: FAMILY MEDICINE

## 2019-07-08 PROCEDURE — 99214 OFFICE O/P EST MOD 30 MIN: CPT | Performed by: PREVENTIVE MEDICINE

## 2019-07-08 RX ORDER — HYDROCODONE BITARTRATE AND ACETAMINOPHEN 5; 325 MG/1; MG/1
1 TABLET ORAL EVERY 6 HOURS PRN
Qty: 18 TABLET | Refills: 0 | Status: SHIPPED | OUTPATIENT
Start: 2019-07-08 | End: 2019-07-12

## 2019-07-08 RX ORDER — HYDROCODONE BITARTRATE AND ACETAMINOPHEN 5; 325 MG/1; MG/1
1 TABLET ORAL EVERY 6 HOURS PRN
COMMUNITY
End: 2019-07-08

## 2019-07-08 ASSESSMENT — PAIN SCALES - GENERAL: PAINLEVEL: EXTREME PAIN (8)

## 2019-07-08 NOTE — TELEPHONE ENCOUNTER
RECORDS RECEIVED FROM: closed bimalleolar frature left ankle - per Debbie    DATE RECEIVED: Jul 10, 2019    NOTES STATUS DETAILS   OFFICE NOTE from referring provider Internal 07/08/19 Dr. Escoto   OFFICE NOTE from other specialist N/A    DISCHARGE SUMMARY from hospital N/A    DISCHARGE REPORT from the ER [facility]where 7/4/19   OPERATIVE REPORT N/A    MEDICATION LIST Internal    IMPLANT RECORD/STICKER N/A    LABS     CBC/DIFF N/A    CULTURES N/A    INJECTIONS DONE IN RADIOLOGY N/A    MRI N/A    CT SCAN N/A    XRAYS (IMAGES & REPORTS) Received 7/4/19   TUMOR     PATHOLOGY  Slides & report N/A

## 2019-07-08 NOTE — PROGRESS NOTES
[address] [address]  [phone]  Dept: [phone]    PRE-OP EVALUATION:  Today's date: 2019    Aishwarya Savage (: 1965) presents for pre-operative evaluation assessment as requested by Dr. Guzman.  She requires evaluation and anesthesia risk assessment prior to undergoing surgery/procedure for treatment of closed bimalleolar fracture of left ankle.    Proposed Surgery/ Procedure: Left Ankle repair   Date of Surgery/ Procedure: 19 or 19  Time of Surgery/ Procedure: [facility]  Hospital/Surgical Facility: [facility]   Fax number for surgical facility: Patient will call with fax number   Primary Physician: Makeda Bryan  Type of Anesthesia Anticipated: General     Patient has a Health Care Directive or Living Will:  NO    1. NO - Do you have a history of heart attack, stroke, stent, bypass or surgery on an artery in the head, neck, heart or legs?  2. NO - Do you ever have any pain or discomfort in your chest?  3. NO - Do you have a history of  Heart Failure?  4. NO - Are you troubled by shortness of breath when: walking on the level, up a slight hill or at night?  5. NO - Do you currently have a cold, bronchitis or other respiratory infection?  6. NO - Do you have a cough, shortness of breath or wheezing?  7. NO - Do you sometimes get pains in the calves of your legs when you walk?  8. NO - Do you or anyone in your family have previous history of blood clots?  9. NO - Do you or does anyone in your family have a serious bleeding problem such as prolonged bleeding following surgeries or cuts?  10. NO - Have you ever had problems with anemia or been told to take iron pills?  11. NO - Have you had any abnormal blood loss such as black, tarry or bloody stools, or abnormal vaginal bleeding?  12. NO - Have you ever had a blood transfusion?  13. NO - Have you or any of your relatives ever had problems with anesthesia?  14. NO - Do you have  sleep apnea, excessive snoring or daytime drowsiness?  15. NO - Do you have any prosthetic heart valves?  16. NO - Do you have prosthetic joints?  17. NO - Is there any chance that you may be pregnant?      HPI:     HPI related to upcoming procedure:   Patient injured ankle 4 days ago jumping [place] dock when she hit the ground under the water hard felt immediate pain in her left ankle.  Tried to realign it but it kept going out of alignment and was brought to emergency care where she was diagnosed with a bimalleolar fracture.  Now in a splint.    See problem list for active medical problems.  Problems all longstanding and stable, except as noted/documented.  See ROS for pertinent symptoms related to these conditions.    DEPRESSION - Patient has a long history of Depression of moderate severity requiring medication for control with recent symptoms being stable..Current symptoms of depression include none.       MEDICAL HISTORY:     Patient Active Problem List    Diagnosis Date Noted     Current mild episode of major depressive disorder without prior episode (H) 12/11/2018     Priority: Medium     Acute seasonal allergic rhinitis due to pollen 05/01/2018     Priority: Medium     Situational mixed anxiety and depressive disorder 11/28/2017     Priority: Medium     Hyperlipidemia LDL goal <160 11/07/2016     Priority: Medium     Patient does not have hyperlipidemia and is in goal range for her age and medical conditions.        Fibromyalgia 10/28/2016     Priority: Medium     CHEYANNE (obstructive sleep apnea)- mild (AHI 6) 09/22/2016     Priority: Medium     Study Date: 9/15/2016- (178.0 lb) apnea/hypopnea index 6.1 events per hour.  REM AHI 9.9, supine AHI 14.9 events per hour. Lowest oxygen saturation 84.8%. PLM index 27.4       HARLEY (generalized anxiety disorder) 03/22/2016     Priority: Medium     Glaucoma suspect 11/15/2012     Priority: Medium     Hypothyroidism      Priority: Medium     HSV (herpes simplex virus)  infection      Priority: Medium     Oral       Depression      Priority: Medium      Past Medical History:   Diagnosis Date     Allergies      HSV (herpes simplex virus) infection     Oral     Neuroma, Harman's      Past Surgical History:   Procedure Laterality Date     CL AFF SURGICAL PATHOLOGY  2001     COLONOSCOPY  2/21/2014    Procedure: COLONOSCOPY;  Colonoscopy, screening;  Surgeon: Micheal Webb MD;  Location: [facility] OR     HC EXCISE HAND/FOOT NEUROMA  2009, 2010    Rt Foot     HC TOOTH EXTRACTION W/FORCEP       TUBAL LIGATION  1/91     Current Outpatient Medications   Medication Sig Dispense Refill     albuterol (PROAIR HFA/PROVENTIL HFA/VENTOLIN HFA) 108 (90 Base) MCG/ACT inhaler INHALE 2 PUFFS BY MOUTH EVERY 6 HOURS AS NEEDED FOR SHORTNESS OF BREATH / DYSPNEA OR WHEEZING 1 Inhaler 3     cetirizine (ZYRTEC) 10 MG tablet TAKE 1 TABLET BY MOUTH EVERY DAY 60 tablet 3     FLUoxetine (PROZAC) 20 MG capsule Take 1 capsule (20 mg) by mouth daily 90 capsule 3     HYDROcodone-acetaminophen (NORCO) 5-325 MG tablet Take 1 tablet by mouth every 6 hours as needed for pain 18 tablet 0     IBUPROFEN 200 MG OR TABS 1 TABLET EVERY 4 TO 6 HOURS AS NEEDED       levothyroxine (SYNTHROID/LEVOTHROID) 150 MCG tablet TAKE 1 TABLET (150 MCG) BY MOUTH DAILY 90 tablet 1     methocarbamol (ROBAXIN) 750 MG tablet Take 1 tablet (750 mg) by mouth 4 times daily as needed for muscle spasms 40 tablet 1     Multiple Vitamins-Minerals (MULTIVITAMIN ADULT PO) Take 1 tablet by mouth daily       OTC products: None, except as noted above    Allergies   Allergen Reactions     Clindamycin Swelling     face     Sulfa Drugs Hives      Latex Allergy: NO    Social History     Tobacco Use     Smoking status: Never Smoker     Smokeless tobacco: Never Used   Substance Use Topics     Alcohol use: Yes     Alcohol/week: 1.2 oz     Types: 2 Standard drinks or equivalent per week     Comment: Socially     History   Drug Use No       REVIEW OF SYSTEMS:    CONSTITUTIONAL: NEGATIVE for fever, chills, change in weight  INTEGUMENTARY/SKIN: NEGATIVE for worrisome rashes, moles or lesions  EYES: NEGATIVE for vision changes or irritation  ENT/MOUTH: NEGATIVE for ear, mouth and throat problems  RESP: NEGATIVE for significant cough or SOB  BREAST: NEGATIVE for masses, tenderness or discharge  CV: NEGATIVE for chest pain, palpitations or peripheral edema  GI: NEGATIVE for nausea, abdominal pain, heartburn, or change in bowel habits  : NEGATIVE for frequency, dysuria, or hematuria  MUSCULOSKELETAL: As above  NEURO: NEGATIVE for weakness, dizziness or paresthesias  ENDOCRINE: NEGATIVE for temperature intolerance, skin/hair changes  HEME: NEGATIVE for bleeding problems  PSYCHIATRIC: NEGATIVE for changes in mood or affect    EXAM:   /80 (BP Location: Right arm, Patient Position: Chair, Cuff Size: Adult Regular)   Pulse 85   Temp 97.8  F (36.6  C) (Oral)   LMP 02/24/2014 (LMP Unknown)   SpO2 99%   Breastfeeding? No     GENERAL APPEARANCE: healthy, alert and no distress     EYES: EOMI, PERRL     HENT: ear canals and TM's normal and nose and mouth without ulcers or lesions     NECK: no adenopathy, no asymmetry, masses, or scars and thyroid normal to palpation     RESP: lungs clear to auscultation - no rales, rhonchi or wheezes     CV: regular rates and rhythm, normal S1 S2, no S3 or S4 and no murmur, click or rub     ABDOMEN:  soft, nontender, no HSM or masses and bowel sounds normal     MS: Left ankle in splint     SKIN: no suspicious lesions or rashes     NEURO: Normal strength and tone, sensory exam grossly normal, mentation intact and speech normal     PSYCH: mentation appears normal. and affect normal/bright     LYMPHATICS: No cervical adenopathy    DIAGNOSTICS:   EKG on file.  No lab work indicated    Recent Labs   Lab Test 02/19/19  1936 04/05/17  1844  10/28/15  1554  02/21/13  1801   HGB 11.9 12.0   < > 12.6   < > 12.4    242   < > 253   < > 272   INR   --   --   --   --   --  0.97     --   --  141   < >  --    POTASSIUM 3.9  --   --  3.7   < >  --    CR 0.78  --   --  0.73   < >  --     < > = values in this interval not displayed.        IMPRESSION:   Reason for surgery/procedure: Closed bimalleolar fracture of left ankle  Diagnosis/reason for consult: Preoperative clearance    The proposed surgical procedure is considered INTERMEDIATE risk.    REVISED CARDIAC RISK INDEX  The patient has the following serious cardiovascular risks for perioperative complications such as (MI, PE, VFib and 3  AV Block):  No serious cardiac risks  INTERPRETATION: 0 risks: Class I (very low risk - 0.4% complication rate)    The patient has the following additional risks for perioperative complications:  No identified additional risks      ICD-10-CM    1. Preop general physical exam Z01.818    2. Closed bimalleolar fracture of right ankle, initial encounter S82.841A        RECOMMENDATIONS:         --Patient is to take all scheduled medications on the day of surgery.    APPROVAL GIVEN to proceed with proposed procedure, without further diagnostic evaluation       Signed Electronically by: Keira Rivera MD    Copy of this evaluation report is provided to requesting physician.    [place] Preop Guidelines    Revised Cardiac Risk Index

## 2019-07-08 NOTE — LETTER
"    7/8/2019         RE: Aishwarya Savage  [address] [place] [address]        Dear Colleague,    Thank you for referring your patient, Aishwarya Savage, to the [facility]. Please see a copy of my visit note below.    NEW PATIENT INTAKE QUESTIONNAIRE  [facility] 7/8/2019      Aishwarya Savage's chief complaint for this visit includes:  Chief Complaint   Patient presents with     Trauma     left ankle fracture last Thursday 7-4-19     PCP: Makeda Bryan    Referring Provider:  No referring provider defined for this encounter.    /77   Pulse 78   Ht 1.632 m (5' 4.25\")   LMP 02/24/2014   BMI 30.66 kg/m     Extreme Pain (8)     Do you need any medication refills at today's visit? No              Reason for Visit:    What part of your body is injured / painful?  left ankle    What caused the injury /pain? Fall    How long ago did your injury occur or pain begin? a week ago    What are your most bothersome symptoms? Pain, Swelling and Inability to perform ADLs    How would you characterize your symptom? sharp    What makes your symptoms better? Rest    What makes your symptoms worse? Movement    Have you been previously seen for this problem? Yes, [facility]    Medical History:    Medical History: See Chart     Have you had surgery on this body part before? No    Medications: Reviewed at today's visit    Allergies: Yes: See Chart         Review of Systems:    Have you recently had a a fever, chills, weight loss? No    Do you have any vision problems? No    Do you have any chest pain or edema? No    Do you have any shortness of breath or wheezing?  No    Do you have stomach problems? No    Do you have any numbness or focal weakness? No    Do you have diabetes? No    Do you have problems with bleeding or clotting? No    Do you have an rashes or other skin lesions? No          HISTORY OF PRESENT ILLNESS  Ms. Savage is a pleasant 54 year old year " old female who presents to clinic today with left ankle fracture that occurred over the weekend  Aishwarya explains [place] and felt an injury to her ankle  Location: left ankle  Quality:  achy pain    Severity: 9/10 at worst    Duration: 2 days  Timing: occurs with any pressure  Modifying factors:  resting and non-use makes it better, movement and use makes it worse  Associated signs & symptoms: swelling and bruising  Previous similar pain: no  Was seen in urgent care and had xrays and placed into a splint  Additional history: as documented    MEDICAL HISTORY  Patient Active Problem List   Diagnosis     Hypothyroidism     HSV (herpes simplex virus) infection     Depression     Glaucoma suspect     HARLEY (generalized anxiety disorder)     CHEYANNE (obstructive sleep apnea)- mild (AHI 6)     Fibromyalgia     Hyperlipidemia LDL goal <160     Situational mixed anxiety and depressive disorder     Acute seasonal allergic rhinitis due to pollen     Current mild episode of major depressive disorder without prior episode (H)       Current Outpatient Medications   Medication Sig Dispense Refill     albuterol (PROAIR HFA/PROVENTIL HFA/VENTOLIN HFA) 108 (90 Base) MCG/ACT inhaler INHALE 2 PUFFS BY MOUTH EVERY 6 HOURS AS NEEDED FOR SHORTNESS OF BREATH / DYSPNEA OR WHEEZING 1 Inhaler 3     cetirizine (ZYRTEC) 10 MG tablet TAKE 1 TABLET BY MOUTH EVERY DAY 60 tablet 3     FLUoxetine (PROZAC) 20 MG capsule Take 1 capsule (20 mg) by mouth daily 90 capsule 3     HYDROcodone-acetaminophen (NORCO) 5-325 MG tablet Take 1 tablet by mouth every 6 hours as needed for severe pain       IBUPROFEN 200 MG OR TABS 1 TABLET EVERY 4 TO 6 HOURS AS NEEDED       levothyroxine (SYNTHROID/LEVOTHROID) 150 MCG tablet TAKE 1 TABLET (150 MCG) BY MOUTH DAILY 90 tablet 1     methocarbamol (ROBAXIN) 750 MG tablet Take 1 tablet (750 mg) by mouth 4 times daily as needed for muscle spasms 40 tablet 1     Multiple  "Vitamins-Minerals (MULTIVITAMIN ADULT PO) Take 1 tablet by mouth daily       omeprazole (PRILOSEC) 20 MG DR capsule Take 1 capsule (20 mg) by mouth daily 30 capsule 3       Allergies   Allergen Reactions     Clindamycin Swelling     face     Sulfa Drugs Hives       Family History   Problem Relation Age of Onset     Cancer Maternal Grandmother         lung     Cancer Mother         lung     Blood Disease Mother         nonhodgkins lymphoma     Thyroid Disease Mother         hypothyroid     Diabetes Mother         Type 2     Diabetes Father         2     Hypertension Father      C.A.D. Father         ? MI at 69     Cardiovascular Paternal Grandfather         AAA     Breast Cancer Other      Glaucoma Paternal Grandmother      Diabetes Sister         diabetic     Cerebrovascular Disease Maternal Aunt      Macular Degeneration No family hx of        Additional medical/Social/Surgical histories reviewed in [facility] and updated as appropriate.     REVIEW OF SYSTEMS (7/8/2019)  10 point ROS of systems including Constitutional, Eyes, Respiratory, Cardiovascular, Gastroenterology, Genitourinary, Integumentary, Musculoskeletal, Psychiatric were all negative except for pertinent positives noted in my HPI.     PHYSICAL EXAM  Vitals:    07/08/19 1041   BP: 148/77   Pulse: 78   Height: 1.632 m (5' 4.25\")     Vital Signs: /77   Pulse 78   Ht 1.632 m (5' 4.25\")   LMP 02/24/2014   BMI 30.66 kg/m    Patient declined being weighed. Body mass index is 30.66 kg/m .    General  - normal appearance, in no obvious distress  CV  - normal pulses at posterior tib and dorsalis pedis  Pulm  - normal respiratory pattern, non-labored  Musculoskeletal - left ankle  - stance: gait favors affected side, reluctant to bear weight  - inspection: moderate swelling laterally, normal bone and joint alignment, swelling deformity  - palpation: tenderness over distal ankle, tibia and fibula in areas of known fracture  - ROM: intact globally but limited " secondary to pain  - strength: 3/5 in eversion limited due to pain    Neuro  - no sensory or motor deficit, grossly normal coordination, normal muscle tone  Skin  - ecchymosis overlying lateral foot-ankle junction, no warmth or induration, no obvious rash  Psych  - interactive, appropriate, normal mood and affect    ASSESSMENT & PLAN  53 yo female with bimalleolar left ankle fracture, displaced  Replaced splint  Will refer to surgery  Given knee scooter referral  dawson COURTNEYN  F/u with Dr Amaro to discuss surgical options      Judah Escoto MD, CAQSM    Again, thank you for allowing me to participate in the care of your patient.        Sincerely,        Judah Escoto MD

## 2019-07-08 NOTE — PROGRESS NOTES
"NEW PATIENT INTAKE QUESTIONNAIRE  [facility] 7/8/2019      Aishwarya Savage's chief complaint for this visit includes:  Chief Complaint   Patient presents with     Trauma     left ankle fracture last Thursday 7-4-19     PCP: Makeda Bryan    Referring Provider:  No referring provider defined for this encounter.    /77   Pulse 78   Ht 1.632 m (5' 4.25\")   LMP 02/24/2014   BMI 30.66 kg/m    Extreme Pain (8)     Do you need any medication refills at today's visit? No              Reason for Visit:    What part of your body is injured / painful?  left ankle    What caused the injury /pain? Fall    How long ago did your injury occur or pain begin? a week ago    What are your most bothersome symptoms? Pain, Swelling and Inability to perform ADLs    How would you characterize your symptom? sharp    What makes your symptoms better? Rest    What makes your symptoms worse? Movement    Have you been previously seen for this problem? Yes, [facility] ED    Medical History:    Medical History: See Chart     Have you had surgery on this body part before? No    Medications: Reviewed at today's visit    Allergies: Yes: See Chart         Review of Systems:    Have you recently had a a fever, chills, weight loss? No    Do you have any vision problems? No    Do you have any chest pain or edema? No    Do you have any shortness of breath or wheezing?  No    Do you have stomach problems? No    Do you have any numbness or focal weakness? No    Do you have diabetes? No    Do you have problems with bleeding or clotting? No    Do you have an rashes or other skin lesions? No        "

## 2019-07-08 NOTE — PATIENT INSTRUCTIONS
Thanks for coming today.  [facility]  [address] [place] [address]    To schedule future appointments in Ortho Clinic, you may call [phone].    To schedule ordered imaging by your provider:   Call [facility] [phone]    To schedule an injection ordered by your provider:  Call [facility] Injection scheduling line: [phone]  MyChart available online at:  [email or web address]    Please call if any further questions or concerns ([phone]).  Clinic hours 8 am to 5 pm.    Return to clinic (call) if symptoms worsen or fail to improve[place]  [address]   [place] [address]   Phone: [phone]   Fax: [phone]: Monday - Friday: 8-5:30     [place], [place]  [address] [place] [address]   Phone: [phone]   Fax: [phone]: Monday - Friday: 8-5:30, Saturday:      [place] [address] [place] [address]   Phone: [phone]: Monday - Friday: 8:30-5     [place]  [address] [place] [address]   Phone: [phone]   Fax: [phone]: Monday - Friday: 8-5:30, Saturday:       [place] [address] [address]   Phone: [phone]   Fax: [phone]: Monday - Friday: 8:30-5:00

## 2019-07-08 NOTE — PROGRESS NOTES
HISTORY OF PRESENT ILLNESS  Ms. Savage is a pleasant 54 year old year old female who presents to clinic today with left ankle fracture that occurred over the weekend  Aishwarya explains [place] and felt an injury to her ankle  Location: left ankle  Quality:  achy pain    Severity: 9/10 at worst    Duration: 2 days  Timing: occurs with any pressure  Modifying factors:  resting and non-use makes it better, movement and use makes it worse  Associated signs & symptoms: swelling and bruising  Previous similar pain: no  Was seen in urgent care and had xrays and placed into a splint  Additional history: as documented    MEDICAL HISTORY  Patient Active Problem List   Diagnosis     Hypothyroidism     HSV (herpes simplex virus) infection     Depression     Glaucoma suspect     HARLEY (generalized anxiety disorder)     CHEYANNE (obstructive sleep apnea)- mild (AHI 6)     Fibromyalgia     Hyperlipidemia LDL goal <160     Situational mixed anxiety and depressive disorder     Acute seasonal allergic rhinitis due to pollen     Current mild episode of major depressive disorder without prior episode (H)       Current Outpatient Medications   Medication Sig Dispense Refill     albuterol (PROAIR HFA/PROVENTIL HFA/VENTOLIN HFA) 108 (90 Base) MCG/ACT inhaler INHALE 2 PUFFS BY MOUTH EVERY 6 HOURS AS NEEDED FOR SHORTNESS OF BREATH / DYSPNEA OR WHEEZING 1 Inhaler 3     cetirizine (ZYRTEC) 10 MG tablet TAKE 1 TABLET BY MOUTH EVERY DAY 60 tablet 3     FLUoxetine (PROZAC) 20 MG capsule Take 1 capsule (20 mg) by mouth daily 90 capsule 3     HYDROcodone-acetaminophen (NORCO) 5-325 MG tablet Take 1 tablet by mouth every 6 hours as needed for severe pain       IBUPROFEN 200 MG OR TABS 1 TABLET EVERY 4 TO 6 HOURS AS NEEDED       levothyroxine (SYNTHROID/LEVOTHROID) 150 MCG tablet TAKE 1 TABLET (150 MCG) BY MOUTH DAILY 90 tablet 1     methocarbamol (ROBAXIN) 750 MG tablet Take 1 tablet (750 mg) by mouth  "4 times daily as needed for muscle spasms 40 tablet 1     Multiple Vitamins-Minerals (MULTIVITAMIN ADULT PO) Take 1 tablet by mouth daily       omeprazole (PRILOSEC) 20 MG DR capsule Take 1 capsule (20 mg) by mouth daily 30 capsule 3       Allergies   Allergen Reactions     Clindamycin Swelling     face     Sulfa Drugs Hives       Family History   Problem Relation Age of Onset     Cancer Maternal Grandmother         lung     Cancer Mother         lung     Blood Disease Mother         nonhodgkins lymphoma     Thyroid Disease Mother         hypothyroid     Diabetes Mother         Type 2     Diabetes Father         2     Hypertension Father      C.A.D. Father         ? MI at 69     Cardiovascular Paternal Grandfather         AAA     Breast Cancer Other      Glaucoma Paternal Grandmother      Diabetes Sister         diabetic     Cerebrovascular Disease Maternal Aunt      Macular Degeneration No family hx of        Additional medical/Social/Surgical histories reviewed in [facility] and updated as appropriate.     REVIEW OF SYSTEMS (7/8/2019)  10 point ROS of systems including Constitutional, Eyes, Respiratory, Cardiovascular, Gastroenterology, Genitourinary, Integumentary, Musculoskeletal, Psychiatric were all negative except for pertinent positives noted in my HPI.     PHYSICAL EXAM  Vitals:    07/08/19 1041   BP: 148/77   Pulse: 78   Height: 1.632 m (5' 4.25\")     Vital Signs: /77   Pulse 78   Ht 1.632 m (5' 4.25\")   LMP 02/24/2014   BMI 30.66 kg/m   Patient declined being weighed. Body mass index is 30.66 kg/m .    General  - normal appearance, in no obvious distress  CV  - normal pulses at posterior tib and dorsalis pedis  Pulm  - normal respiratory pattern, non-labored  Musculoskeletal - left ankle  - stance: gait favors affected side, reluctant to bear weight  - inspection: moderate swelling laterally, normal bone and joint alignment, swelling deformity  - palpation: tenderness over distal ankle, tibia and " fibula in areas of known fracture  - ROM: intact globally but limited secondary to pain  - strength: 3/5 in eversion limited due to pain    Neuro  - no sensory or motor deficit, grossly normal coordination, normal muscle tone  Skin  - ecchymosis overlying lateral foot-ankle junction, no warmth or induration, no obvious rash  Psych  - interactive, appropriate, normal mood and affect    ASSESSMENT & PLAN  53 yo female with bimalleolar left ankle fracture, displaced  Replaced splint  Will refer to surgery  Given knee scooter referral  dawson KILPATRICK  F/u with Dr Amaro to discuss surgical options      Judah Escoto MD, CAQSM

## 2019-07-10 ENCOUNTER — ANCILLARY PROCEDURE (OUTPATIENT)
Dept: GENERAL RADIOLOGY | Facility: CLINIC | Age: 54
End: 2019-07-10
Payer: COMMERCIAL

## 2019-07-10 ENCOUNTER — PRE VISIT (OUTPATIENT)
Dept: ORTHOPEDICS | Facility: CLINIC | Age: 54
End: 2019-07-10

## 2019-07-10 ENCOUNTER — OFFICE VISIT (OUTPATIENT)
Dept: ORTHOPEDICS | Facility: CLINIC | Age: 54
End: 2019-07-10
Payer: COMMERCIAL

## 2019-07-10 DIAGNOSIS — S82.842A CLOSED BIMALLEOLAR FRACTURE OF LEFT ANKLE, INITIAL ENCOUNTER: Primary | ICD-10-CM

## 2019-07-10 DIAGNOSIS — S82.842A CLOSED BIMALLEOLAR FRACTURE OF LEFT ANKLE: ICD-10-CM

## 2019-07-10 ASSESSMENT — ENCOUNTER SYMPTOMS
MUSCLE WEAKNESS: 0
NIGHT SWEATS: 0
WEIGHT LOSS: 0
STIFFNESS: 0
WEIGHT GAIN: 0
NECK PAIN: 1
INCREASED ENERGY: 0
FEVER: 0
HALLUCINATIONS: 0
BACK PAIN: 0
MYALGIAS: 1
JOINT SWELLING: 0
ARTHRALGIAS: 0
DECREASED APPETITE: 0
CHILLS: 0
ALTERED TEMPERATURE REGULATION: 1
FATIGUE: 0
POLYDIPSIA: 1
MUSCLE CRAMPS: 0
BRUISES/BLEEDS EASILY: 0
SWOLLEN GLANDS: 0

## 2019-07-10 NOTE — PROGRESS NOTES
This 54-year-old woman twisted her ankle [place] 6 days ago.  She had a dislocation which she reduced herself twice.  Today she denies any numbness or tingling in the foot.  She has been elevating and icing I reviewed her patient survey information in the EMR.    Examination she is alert oriented has a normal mood and affect and is in no acute distress.  She is able to move her toes and they are sensate.  She has ecchymosis up of the proximal calf medially almost to the knee joint.  She is in a splint.  Respirations are regular and unlabored eyes are anicteric.    I reviewed her x-rays.  The ankle is currently located and she has a trimalleolar fracture.  I recommended fixation of the medial lateral malleolus.    This patient is amenable to surgery at any point in time.  The surgery carries the risk of infection bleeding pain numbness tingling stiffness weakness deep venous thrombosis and pulmonary embolism.

## 2019-07-10 NOTE — NURSING NOTE
Reason For Visit:   Chief Complaint   Patient presents with     Consult     left ankle fracture        ?  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  Work status?  Full time.  Date of injury: 7/4/19  Type of injury: Jumping off a dock foot hit the bottom of lake.  Date of surgery: NA  Type of surgery: NA.  Smoker: No  Request smoking cessation information: No    Pain Assessment  Patient Currently in Pain: Yes  0-10 Pain Scale: 7  Primary Pain Location: Ankle

## 2019-07-10 NOTE — LETTER
7/10/2019       RE: Aishwarya Savage  [address] [place] [address]     Dear Colleague,    Thank you for referring your patient, Aishwarya Savage, [facility]. Please see a copy of my visit note below.    This 54-year-old woman [place].  She had a dislocation which she reduced herself twice.  Today she denies any numbness or tingling in the foot.  She has been elevating and icing I reviewed her patient survey information in the EMR.    Examination she is alert oriented has a normal mood and affect and is in no acute distress.  She is able to move her toes and they are sensate.  She has ecchymosis up of the proximal calf medially almost to the knee joint.  She is in a splint.  Respirations are regular and unlabored eyes are anicteric.    I reviewed her x-rays.  The ankle is currently located and she has a trimalleolar fracture.  I recommended fixation of the medial lateral malleolus.    This patient is amenable to surgery at any point in time.  The surgery carries the risk of infection bleeding pain numbness tingling stiffness weakness deep venous thrombosis and pulmonary embolism.    Again, thank you for allowing me to participate in the care of your patient.      Sincerely,    Tin Guzman MD

## 2019-07-10 NOTE — NURSING NOTE
Teaching Flowsheet   Relevant Diagnosis: internal fixation left ankle medial lateral malleolus  Teaching Topic: preop     Person(s) involved in teaching:   Patient and      Motivation Level:  Asks Questions: Yes  Eager to Learn: Yes  Cooperative: Yes  Receptive (willing/able to accept information): Yes  Any cultural factors/Lutheran beliefs that may influence understanding or compliance? No       Patient and Family demonstrates understanding of the following:  Reason for the appointment, diagnosis and treatment plan: Yes  Knowledge of proper use of medications and conditions for which they are ordered (with special attention to potential side effects or drug interactions): Yes  Which situations necessitate calling provider and whom to contact: Yes       Teaching Concerns Addressed:        Proper use and care of soap (medical equip, care aids, etc.): Yes  Nutritional needs and diet plan: Yes  Pain management techniques: Yes  Wound Care: Yes  How and/when to access community resources: Yes     Instructional Materials Used/Given: surgery packet.  She does .  She was instructed on elevation above the heart for best pain and edema control.  She will obtain H&P with PCP clinic.  They have no other questions at this time.  Her  will take her home from surgery and take care of her.

## 2019-07-11 ENCOUNTER — MEDICAL CORRESPONDENCE (OUTPATIENT)
Dept: HEALTH INFORMATION MANAGEMENT | Facility: CLINIC | Age: 54
End: 2019-07-11

## 2019-07-12 ENCOUNTER — ANCILLARY PROCEDURE (OUTPATIENT)
Dept: RADIOLOGY | Facility: AMBULATORY SURGERY CENTER | Age: 54
End: 2019-07-12
Attending: ORTHOPAEDIC SURGERY
Payer: COMMERCIAL

## 2019-07-12 ENCOUNTER — ANESTHESIA (OUTPATIENT)
Dept: SURGERY | Facility: AMBULATORY SURGERY CENTER | Age: 54
End: 2019-07-12

## 2019-07-12 ENCOUNTER — ANESTHESIA EVENT (OUTPATIENT)
Dept: SURGERY | Facility: AMBULATORY SURGERY CENTER | Age: 54
End: 2019-07-12

## 2019-07-12 ENCOUNTER — HOSPITAL ENCOUNTER (OUTPATIENT)
Facility: AMBULATORY SURGERY CENTER | Age: 54
End: 2019-07-12
Attending: ORTHOPAEDIC SURGERY
Payer: COMMERCIAL

## 2019-07-12 VITALS
OXYGEN SATURATION: 94 % | TEMPERATURE: 97.1 F | WEIGHT: 180 LBS | SYSTOLIC BLOOD PRESSURE: 133 MMHG | RESPIRATION RATE: 22 BRPM | BODY MASS INDEX: 30.73 KG/M2 | HEART RATE: 70 BPM | HEIGHT: 64 IN | DIASTOLIC BLOOD PRESSURE: 95 MMHG

## 2019-07-12 DIAGNOSIS — S82.892S CLOSED LEFT ANKLE FRACTURE, SEQUELA: Primary | ICD-10-CM

## 2019-07-12 DIAGNOSIS — R52 PAIN: ICD-10-CM

## 2019-07-12 DEVICE — IMP SCR SYN CORTEX 3.5X14MM SELF TAP SS 204.814: Type: IMPLANTABLE DEVICE | Site: ANKLE | Status: FUNCTIONAL

## 2019-07-12 DEVICE — IMPLANTABLE DEVICE: Type: IMPLANTABLE DEVICE | Site: ANKLE | Status: FUNCTIONAL

## 2019-07-12 DEVICE — IMP SCR SYN CAN 4.0X18MM FT SS 206.018: Type: IMPLANTABLE DEVICE | Site: ANKLE | Status: FUNCTIONAL

## 2019-07-12 DEVICE — IMP PLATE SYN 1/3 TUBULAR 69MM 06H SS 241.361: Type: IMPLANTABLE DEVICE | Site: ANKLE | Status: FUNCTIONAL

## 2019-07-12 RX ORDER — LIDOCAINE HYDROCHLORIDE 20 MG/ML
INJECTION, SOLUTION INFILTRATION; PERINEURAL PRN
Status: DISCONTINUED | OUTPATIENT
Start: 2019-07-12 | End: 2019-07-12

## 2019-07-12 RX ORDER — ONDANSETRON 2 MG/ML
INJECTION INTRAMUSCULAR; INTRAVENOUS PRN
Status: DISCONTINUED | OUTPATIENT
Start: 2019-07-12 | End: 2019-07-12

## 2019-07-12 RX ORDER — CEFAZOLIN SODIUM 2 G/50ML
2 SOLUTION INTRAVENOUS
Status: COMPLETED | OUTPATIENT
Start: 2019-07-12 | End: 2019-07-12

## 2019-07-12 RX ORDER — ASPIRIN 325 MG
325 TABLET ORAL DAILY
Qty: 28 TABLET | Refills: 0 | Status: SHIPPED | OUTPATIENT
Start: 2019-07-12 | End: 2019-12-16

## 2019-07-12 RX ORDER — SODIUM CHLORIDE, SODIUM LACTATE, POTASSIUM CHLORIDE, CALCIUM CHLORIDE 600; 310; 30; 20 MG/100ML; MG/100ML; MG/100ML; MG/100ML
INJECTION, SOLUTION INTRAVENOUS CONTINUOUS
Status: DISCONTINUED | OUTPATIENT
Start: 2019-07-12 | End: 2019-07-12 | Stop reason: HOSPADM

## 2019-07-12 RX ORDER — FENTANYL CITRATE 50 UG/ML
25-50 INJECTION, SOLUTION INTRAMUSCULAR; INTRAVENOUS
Status: DISCONTINUED | OUTPATIENT
Start: 2019-07-12 | End: 2019-07-12 | Stop reason: HOSPADM

## 2019-07-12 RX ORDER — NAPROXEN 500 MG/1
500 TABLET ORAL EVERY 12 HOURS PRN
Qty: 10 TABLET | Refills: 0 | Status: SHIPPED | OUTPATIENT
Start: 2019-07-12 | End: 2019-12-16

## 2019-07-12 RX ORDER — ACETAMINOPHEN 325 MG/1
975 TABLET ORAL ONCE
Status: DISCONTINUED | OUTPATIENT
Start: 2019-07-12 | End: 2019-07-12 | Stop reason: HOSPADM

## 2019-07-12 RX ORDER — GABAPENTIN 300 MG/1
300 CAPSULE ORAL ONCE
Status: COMPLETED | OUTPATIENT
Start: 2019-07-12 | End: 2019-07-12

## 2019-07-12 RX ORDER — DEXAMETHASONE SODIUM PHOSPHATE 4 MG/ML
INJECTION, SOLUTION INTRA-ARTICULAR; INTRALESIONAL; INTRAMUSCULAR; INTRAVENOUS; SOFT TISSUE PRN
Status: DISCONTINUED | OUTPATIENT
Start: 2019-07-12 | End: 2019-07-12

## 2019-07-12 RX ORDER — NALOXONE HYDROCHLORIDE 0.4 MG/ML
.1-.4 INJECTION, SOLUTION INTRAMUSCULAR; INTRAVENOUS; SUBCUTANEOUS
Status: DISCONTINUED | OUTPATIENT
Start: 2019-07-12 | End: 2019-07-12 | Stop reason: HOSPADM

## 2019-07-12 RX ORDER — PROPOFOL 10 MG/ML
INJECTION, EMULSION INTRAVENOUS CONTINUOUS PRN
Status: DISCONTINUED | OUTPATIENT
Start: 2019-07-12 | End: 2019-07-12

## 2019-07-12 RX ORDER — BUPIVACAINE HYDROCHLORIDE AND EPINEPHRINE 2.5; 5 MG/ML; UG/ML
INJECTION, SOLUTION INFILTRATION; PERINEURAL PRN
Status: DISCONTINUED | OUTPATIENT
Start: 2019-07-12 | End: 2019-07-12

## 2019-07-12 RX ORDER — METHOCARBAMOL 750 MG/1
750 TABLET, FILM COATED ORAL
Status: DISCONTINUED | OUTPATIENT
Start: 2019-07-12 | End: 2019-07-13 | Stop reason: HOSPADM

## 2019-07-12 RX ORDER — LIDOCAINE 40 MG/G
CREAM TOPICAL
Status: DISCONTINUED | OUTPATIENT
Start: 2019-07-12 | End: 2019-07-12 | Stop reason: HOSPADM

## 2019-07-12 RX ORDER — ACETAMINOPHEN 325 MG/1
650 TABLET ORAL
Status: DISCONTINUED | OUTPATIENT
Start: 2019-07-12 | End: 2019-07-13 | Stop reason: HOSPADM

## 2019-07-12 RX ORDER — PROPOFOL 10 MG/ML
INJECTION, EMULSION INTRAVENOUS PRN
Status: DISCONTINUED | OUTPATIENT
Start: 2019-07-12 | End: 2019-07-12

## 2019-07-12 RX ORDER — FLUMAZENIL 0.1 MG/ML
0.2 INJECTION, SOLUTION INTRAVENOUS
Status: DISCONTINUED | OUTPATIENT
Start: 2019-07-12 | End: 2019-07-12 | Stop reason: HOSPADM

## 2019-07-12 RX ORDER — CEFAZOLIN SODIUM 1 G/50ML
1 SOLUTION INTRAVENOUS SEE ADMIN INSTRUCTIONS
Status: DISCONTINUED | OUTPATIENT
Start: 2019-07-12 | End: 2019-07-12 | Stop reason: HOSPADM

## 2019-07-12 RX ORDER — HYDROCODONE BITARTRATE AND ACETAMINOPHEN 5; 325 MG/1; MG/1
1-2 TABLET ORAL EVERY 4 HOURS PRN
Qty: 20 TABLET | Refills: 0 | Status: SHIPPED | OUTPATIENT
Start: 2019-07-12 | End: 2019-07-26

## 2019-07-12 RX ADMIN — BUPIVACAINE HYDROCHLORIDE AND EPINEPHRINE 20 ML: 2.5; 5 INJECTION, SOLUTION INFILTRATION; PERINEURAL at 14:30

## 2019-07-12 RX ADMIN — CEFAZOLIN SODIUM 2 G: 2 SOLUTION INTRAVENOUS at 15:53

## 2019-07-12 RX ADMIN — PROPOFOL 150 MCG/KG/MIN: 10 INJECTION, EMULSION INTRAVENOUS at 15:41

## 2019-07-12 RX ADMIN — PROPOFOL: 10 INJECTION, EMULSION INTRAVENOUS at 16:21

## 2019-07-12 RX ADMIN — DEXAMETHASONE SODIUM PHOSPHATE 4 MG: 4 INJECTION, SOLUTION INTRA-ARTICULAR; INTRALESIONAL; INTRAMUSCULAR; INTRAVENOUS; SOFT TISSUE at 15:41

## 2019-07-12 RX ADMIN — FENTANYL CITRATE 50 MCG: 50 INJECTION, SOLUTION INTRAMUSCULAR; INTRAVENOUS at 14:24

## 2019-07-12 RX ADMIN — ONDANSETRON 4 MG: 2 INJECTION INTRAMUSCULAR; INTRAVENOUS at 15:41

## 2019-07-12 RX ADMIN — PROPOFOL 200 MG: 10 INJECTION, EMULSION INTRAVENOUS at 15:40

## 2019-07-12 RX ADMIN — LIDOCAINE HYDROCHLORIDE 100 MG: 20 INJECTION, SOLUTION INFILTRATION; PERINEURAL at 15:40

## 2019-07-12 RX ADMIN — GABAPENTIN 300 MG: 300 CAPSULE ORAL at 13:42

## 2019-07-12 RX ADMIN — SODIUM CHLORIDE, SODIUM LACTATE, POTASSIUM CHLORIDE, CALCIUM CHLORIDE: 600; 310; 30; 20 INJECTION, SOLUTION INTRAVENOUS at 15:28

## 2019-07-12 ASSESSMENT — MIFFLIN-ST. JEOR: SCORE: 1395.44

## 2019-07-12 NOTE — ANESTHESIA PROCEDURE NOTES
Peripheral Nerve Block Procedure Note    Staff:     Anesthesiologist:  Giovanny Ochoa MD    Referred By:  Tin Guzman MD  Location: Pre-op  Procedure Start/Stop TImes:      7/12/2019 2:26 PM     7/12/2019 2:30 PM    patient identified, IV checked, site marked, risks and benefits discussed, informed consent, monitors and equipment checked, pre-op evaluation, at physician/surgeon's request and post-op pain management      Correct Patient: Yes      Correct Position: Yes      Correct Site: Yes      Correct Procedure: Yes      Correct Laterality:  Yes    Site Marked:  Yes  Procedure details:     Procedure:  Adductor canal    ASA:  2    Laterality:  Left    Position:  Supine    Sterile Prep: chloraprep, mask and sterile gloves      Local skin infiltration:  None    Needle:  Short bevel and insulated    Needle gauge:  21    Needle length (mm):  110    Ultrasound: Yes      Ultrasound used to identify targeted nerve, plexus, or vascular structure and placed a needle adjacent to it      Permanent Image entered into patiient's record      Abnormal pain on injection: No      Blood Aspirated: No      Paresthesias:  No    Bleeding at site: No      Bolus via:  Needle    Infusion Method:  Single Shot    Complications:  None

## 2019-07-12 NOTE — BRIEF OP NOTE
[facility]    Brief Operative Note    Pre-operative diagnosis: Fracture  Post-operative diagnosis * No post-op diagnosis entered *  Procedure: Procedure(s):  Internal Fixation Left Ankle Medial Lateral Malleolus  Surgeon: Surgeon(s) and Role:     * Tin Guzman MD - Primary  Anesthesia: Combined General with Block   Estimated blood loss: Less than 10 ml  Drains: None  Specimens: * No specimens in log *  Findings:   None.  Complications: None.  Implants:    Implant Name Type Inv. Item Serial No.  Lot No. LRB No. Used   IMP SCR SYN CAN 4.0X28MM FT .028 Metallic Hardware/Allen IMP SCR SYN CAN 4.0X28MM FT .028  [employer]  Left 1   IMP PLATE SYN 1/3 TUBULAR 69MM 06H .361 Metallic Hardware/[employer] IMP PLATE SYN 1/3 TUBULAR 69MM 06H .361  [employer]  Left 1   IMP SCR SYN LOCK T6 STARDRIVE 2.0X20MM SELF TAP Metallic Hardware/Allen IMP SCR SYN LOCK T6 STARDRIVE 2.0X20MM SELF TAP  [employer]  Left 1   IMP SCR SYN CORTEX 3.5X14MM SELF TAP .814 Metallic Hardware/Allen IMP SCR SYN CORTEX 3.5X14MM SELF TAP .814  [employer]  Left 3   IMP SCR SYN CAN 4.0X18MM FT .018 Metallic Hardware/Allen IMP SCR SYN CAN 4.0X18MM FT .018  [employer]AT[employer]  Left 2   IMP SCR BONE ZIM 4.0X50MM SELF TAP 1/2 THRD Metallic Hardware/Allen IMP SCR BONE ZIM 4.0X50MM SELF TAP 1/2 THRD  [employer]  Left 2          NWB LLE   ASA 325mg x 4 weeks   F/u 2 weeks for wound check with Dr. Guzman   Discharge from PACU when meeting criteria

## 2019-07-12 NOTE — ANESTHESIA CARE TRANSFER NOTE
Patient: Aishwarya Savage    Procedure(s):  Internal Fixation Left Ankle Medial Lateral Malleolus    Diagnosis: Fracture  Diagnosis Additional Information: No value filed.    Anesthesia Type:   No value filed.     Note:  Airway :Face Mask  Patient transferred to:PACU  Comments:   Transferred to: PACU    Patient vital signs: stable    Airway: none    Monitors and alarms on; PIV intact and patent; simple face mask on at 6L/min 02; report given to PACU RN.     132/74, 67,20,97%,97.0    Idania Peralta CRNA, APRN    7/12/2019  5:40 PM Handoff Report: Identifed the Patient, Identified the Reponsible Provider, Reviewed the pertinent medical history, Discussed the surgical course, Reviewed Intra-OP anesthesia mangement and issues during anesthesia, Set expectations for post-procedure period and Allowed opportunity for questions and acknowledgement of understanding      Vitals: (Last set prior to Anesthesia Care Transfer)    CRNA VITALS  7/12/2019 1706 - 7/12/2019 1740      7/12/2019             Pulse:  82    SpO2:  94 %    Resp Rate (observed):  1  (Abnormal)     Resp Rate (set):  10                Electronically Signed By: KIET Fang CRNA  July 12, 2019  5:40 PM

## 2019-07-12 NOTE — ANESTHESIA POSTPROCEDURE EVALUATION
Anesthesia POST Procedure Evaluation    Patient: Aishwarya Savage   MRN:     [id number] Gender:   female   Age:    54 year old :      1965        Preoperative Diagnosis: Fracture   Procedure(s):  Internal Fixation Left Ankle Medial Lateral Malleolus   Postop Comments: No value filed.       Anesthesia Type:  General, Regional  No value filed.    Reportable Event: NO     PAIN: Uncomplicated   Sign Out status: Comfortable, Well controlled pain     PONV: No PONV   Sign Out status:  No Nausea or Vomiting     Neuro/Psych: Uneventful perioperative course   Sign Out Status: Preoperative baseline; Age appropriate mentation     Airway/Resp.: Uneventful perioperative course   Sign Out Status: Non labored breathing, age appropriate RR; Resp. Status within EXPECTED Parameters     CV: Uneventful perioperative course   Sign Out status: Appropriate BP and perfusion indices; Appropriate HR/Rhythm     Disposition:   Sign Out in:  PACU  Disposition:  Phase II; Home  Recovery Course: Uneventful  Follow-Up: Not required           Last Anesthesia Record Vitals:  CRNA VITALS  2019 1706 - 2019 1800      2019             Pulse:  82    SpO2:  94 %    Resp Rate (observed):  1  (Abnormal)     Resp Rate (set):  10          Last PACU Vitals:  Vitals Value Taken Time   /84 2019  5:45 PM   Temp     Pulse     Resp 10 2019  5:45 PM   SpO2 96 % 2019  5:45 PM   Temp src     NIBP     Pulse     SpO2     Resp     Temp     Ht Rate     Temp 2           Electronically Signed By: Hernesto Ji MD, MD, 2019, 6:00 PM

## 2019-07-12 NOTE — ANESTHESIA PREPROCEDURE EVALUATION
Anesthesia Pre-Procedure Evaluation    Patient: Aishwarya Savage   MRN:     [id number] Gender:   female   Age:    54 year old :      1965        Preoperative Diagnosis: Fracture   Procedure(s):  Internal Fixation Left Ankle Medial Lateral Malleolus     Past Medical History:   Diagnosis Date     Allergies      HSV (herpes simplex virus) infection     Oral     Neuroma, Harman's       Past Surgical History:   Procedure Laterality Date     CL AFF SURGICAL PATHOLOGY       COLONOSCOPY  2014    Procedure: COLONOSCOPY;  Colonoscopy, screening;  Surgeon: Micheal Webb MD;  Location: [facility] OR     HC EXCISE HAND/FOOT NEUROMA  ,     Rt Foot     HC TOOTH EXTRACTION W/FORCEP       TUBAL LIGATION            Anesthesia Evaluation     .             ROS/MED HX    ENT/Pulmonary:     (+)sleep apnea, , . .    Neurologic:       Cardiovascular:         METS/Exercise Tolerance:     Hematologic:         Musculoskeletal:         GI/Hepatic:         Renal/Genitourinary:         Endo:     (+) thyroid problem .      Psychiatric:         Infectious Disease:         Malignancy:         Other:                         PHYSICAL EXAM:   Mental Status/Neuro: A/A/O   Airway: Facies: Feasible  Mallampati: II  Mouth/Opening: Full  TM distance: Not Assessed  Neck ROM: Not Assessed   Respiratory: Auscultation: CTAB      CV:    Comments:                    Lab Results   Component Value Date    WBC 3.9 (L) 2019    HGB 11.9 2019    HCT 35.9 2019     2019    CRP <2.9 10/25/2016    SED 9 2017     2019    POTASSIUM 3.9 2019    CHLORIDE 106 2019    CO2 28 2019    BUN 17 2019    CR 0.78 2019    GLC 86 2019    JOHN 9.0 2019    PHOS 4.2 2019    MAG 2.0 10/28/2014    ALBUMIN 4.0 2019    PROTTOTAL 7.2 10/28/2015    ALT 31 10/28/2015    AST 18 10/28/2015    ALKPHOS 88 10/28/2015    BILITOTAL 0.3 10/28/2015    LIPASE 153 10/28/2015    AMYLASE  "59 10/28/2015    PTT 27 02/21/2013    INR 0.97 02/21/2013    TSH 1.68 02/19/2019    T4 0.94 10/28/2015       Preop Vitals  BP Readings from Last 3 Encounters:   07/12/19 151/79   07/08/19 142/80   07/08/19 148/77    Pulse Readings from Last 3 Encounters:   07/12/19 70   07/08/19 85   07/08/19 78      Resp Readings from Last 3 Encounters:   07/12/19 16   06/11/19 18   02/19/19 18    SpO2 Readings from Last 3 Encounters:   07/12/19 97%   07/08/19 99%   06/11/19 94%      Temp Readings from Last 1 Encounters:   07/12/19 36.7  C (98  F) (Oral)    Ht Readings from Last 1 Encounters:   07/12/19 1.632 m (5' 4.25\")      Wt Readings from Last 1 Encounters:   07/12/19 81.6 kg (180 lb)    Estimated body mass index is 30.66 kg/m  as calculated from the following:    Height as of this encounter: 1.632 m (5' 4.25\").    Weight as of this encounter: 81.6 kg (180 lb).     LDA:  Peripheral IV 07/12/19 Right Hand (Active)   Site Assessment WDL 7/12/2019  1:50 PM   Line Status Infusing 7/12/2019  1:50 PM   Phlebitis Scale 0-->no symptoms 7/12/2019  1:50 PM   Infiltration Scale 0 7/12/2019  1:50 PM   Extravasation? No 7/12/2019  1:50 PM   Dressing Intervention New dressing  7/12/2019  1:50 PM   Number of days: 0       Airway - Adult/Peds laryngeal mask airway (Active)   Number of days: 0            Assessment:   ASA SCORE: 2    NPO Status: > 6 hours since completed Solid [employer]   Documentation: H&P complete; Preop Testing complete; Consents complete   Proceeding: Proceed without further delay  Tobacco Use:  NO Active use of Tobacco/UNKNOWN Tobacco use status     Plan:   Anes. Type:  General; Regional     RA Details:  Pre Induction; SS; Exparel     RA-Location/Type: Nerve Block; Sciatic ((and adductor))   Pre-Induction: Midazolam IV; Opioid IV; Acetaminophen PO; Gabapentin PO   Induction:  IV (Standard)   Airway: LMA   Access/Monitoring: PIV   Maintenance: IV   Emergence: Procedure Site   Logistics: Same Day Surgery     Postop " Pain/Sedation Strategy:  Standard-Options: Opioids PRN; Block SS; Exparel     PONV Management:  Adult Risk Factors: Female, Non-Smoker, Postop Opioids  Prevention: Ondansetron; Dexamethasone     CONSENT: Direct conversation   Plan and risks discussed with: Patient   Blood Products: Consent Deferred (Minimal Blood Loss)                         Giovanny Ochoa MD

## 2019-07-12 NOTE — DISCHARGE INSTRUCTIONS
"Information about liposomal bupivacaine (Exparel)    What is Liposomal Bupivacaine?    Liposomal Bupivacaine is a numbing medication that can help you manage your pain after surgery.  This medication is similar to \"novacaine,\" which is often used by the dentist.  Liposomal bupivacaine is released slowly and can help control pain for up to 72 hours.    What is the purpose of Liposomal Bupivacaine?    To manage your pain after surgery    To help you sleep better, take deep breaths, walk more comfortable, and feel up to visiting with others    How is the procedure done?    Liposomal bupivacaine is a medication given by an injection.    It is usually given right before your surgery.  If this is the case, you will be awake or sedated, but you should experience minimal pain during the procedure.    For some people, the injection may be given at the very end of your surgery.  It all depends on the type of surgery and your situation.    The procedure usually takes about 5-15 minutes.  An ultrasound machine will help the anesthesiologist insert it in the right place or the surgeon will inject it under direct vision.     A needle is used to place the numbing medication under your skin.  It provides pain relief by numbing the tissue in the area where your surgeon will make the incision.    What can I expect?    You may experience numbness, tingling, or a feeling of heaviness around the area that was injected.    If you experience any of the follow symptoms IMMEDIATELY CALL THE REGIONAL ANESTHESIA PAIN SERVICE:    Numbness or tingling occurs in areas other than around the injection site    Blurry vision    Ringing in your ears    A metallic taste in your mouth    PAGE: Dial [phone].  When prompted, enter the following 4-digit ID number:  [id number].  You will be prompted to enter your phone number; and then enter the # sign.  The clinician on call will call you back.    OR    CALL: Dial [phone].  Let the hospital  " know that you are having a problem with a nerve block and that you would like to speak to the regional anesthesia pain service right away.    You should not receive any other type of numbing medication within 4 days after receiving liposomal bupivacaine unless your anesthesiologist approves.    Post Operative Instructions: Regional Anesthetic for Lower Extremity with Liposomal Bupivacaine  General Information:   Regional anesthesia is when local anesthetic or  numbing  medication is injected around the nerves to anesthetize or  numb  the area supplied by that set of nerves. It is a type of analgesia used to control pain and decreases the need for narcotics following surgery.    Types of Regional Blocks:  Femoral: A block injected into the groin area of the operative leg of a patient having thigh or knee surgery.  Adductor Canal: A block injected into the mid thigh of the operative leg of a patient having knee or ankle surgery.  Popliteal or Distal Sciatic: A block injected into the back of the knee of the operative leg of patients having foot or ankle surgery.   Ankle:  An anesthetic medication is injected into the ankle of the operative leg of a patient having foot or toe surgery.     Procedure:   The type of anesthesia your doctor used to numb your leg will usually not wear off for 24-48 hours, but may last as long as 72 hours. You should be careful during that period, since it is possible to injure your leg without being aware of the injury. While your leg is numb you should:    Use crutches (minimal weight bearing until your motor and strength is completely back to normal)    Avoid striking or bumping your leg    Avoid extreme hot or cold    Discomfort:  You will have a tingling and prickly sensation in your leg as the feeling begins to return; you can also expect some discomfort. The amount of discomfort is unpredictable, but if you have more pain than can be controlled with pain medication, you should notify  "your physician.     Pain Medicine:   Begin taking your oral pain pills before bedtime and during the night to avoid a sudden onset of pain as part of the block wears off. Do not engage in drinking, driving, or hazardous occupations while taking pain medication.       Safety Tips for Using Crutches    Crutch Fit:    Assume good standing posture with shoulders relaxed and crutch tips 6-8 inches out from the side of the foot.    The underarm pad should fall 2-3 fingers width below the armpit.    The handgrip is positioned level with the wrist to allow 30  flexion at the elbow.    Safety Tips:    Bear weight on your hands, not on your armpits.    Do not add extra padding to the underarm pad. This will, in effect, lengthen the crutches and increase risk of nerve injury.    Wear flat, properly fitting shoes. Do not walk in stocking feet, high heels or slippers.    Household hazards:  --Throw rugs should be removed from floors.  --Stairs should be cleared of obstacles.  --Use extra caution on slippery, highly polished, littered or uneven floor surfaces.  --Check for electric cords.    Check crutch tips for excessive wear and keep wing nuts tight.    While walking, look forward with  head up  and  eyes open.  Take equal length steps.    Use BOTH crutches.    Stairs Sequence:    UP: \"Good\" leg first, followed by  bad  leg, then crutches.    DOWN: Crutches, followed by  bad  leg, \"good\" leg.     Walking with Crutches:    Move both crutches forward at the same time.    Non-Weight Bearing (NWB):  Hold the involved leg up and swing through the crutches with the involved leg. The involved leg does not touch the floor.    Toe Touch Weight Bearing (TTWB): Move the involved leg forward. Rest it lightly on the floor for balance only. Step through the crutches with the uninvolved leg.    Partial Weight Bearing (PWB): Move the involved leg forward. Step down the weight of the leg only.  Step through the crutches with the uninvolved " "leg.    Weight Bearing As Tolerated (WBAT): Move the involved leg forward. Put as much pressure through the involved leg as you can tolerate comfortably. Then step through the crutches with the uninvolved leg.      [facility]  Home Care Following Anesthesia  For 24 hours after surgery:  1. Get plenty of rest.  A responsible adult must stay with you for at least 24 hours after you leave the surgery center.  2. Do not drive or use heavy equipment.  If you have weakness or tingling, don't drive or use heavy equipment until this feeling goes away.   3. Do not drink alcohol.   4. Avoid strenuous or risky activities.  Ask for help when climbing stairs.  5. You may feel lightheaded.  IF so, sit for a few minutes before standing.  Have someone help you get up.   6. If you have nausea (feel sick to your stomach): Drink only clear liquids such as apple juice, ginger ale, broth or 7-Up.  Rest may also help.  Be sure to drink enough fluids.  Move to a regular diet as you feel able.   7. You may have a slight fever.  Call the doctor if your fever is over 100 F (37.7 C) (taken under the tongue) or lasts longer than 24 hours.  8. You may have a dry mouth, a sore throat, muscle aches or trouble sleeping. These should go away after 24 hours.  9. Do not make important or legal decisions.        Today you received a Marcaine or bupivacaine block to numb the nerves near your surgery site.  This is a block using local anesthetic or \"numbing\" medication injected around the nerves to anesthetize or \"numb\" the area supplied by those nerves.  This block is injected into the muscle layer near your surgical site.  The medication may numb the location where you had surgery for 6-18 hours, but may last up to 24 hours.  If your surgical site is an arm or leg you should be careful with your affected limb, since it is possible to injure your limb without being aware of it due to the numbing.  Until full " feeling returns, you should guard against bumping or hitting your limb, and avoid extreme hot or cold temperatures on the skin.  As the block wears off, the feeling will return as a tingling or prickly sensation near your surgical site.  You will experience more discomfort from your incision as the feeling returns.  You may want to take a pain pill (a narcotic or Tylenol if this was prescribed by your surgeon) when you start to experience mild pain before the pain beccomes more severe.  If your pain medications do not control your pain you should notifiy your surgeon.    Tips for taking pain medications  To get the best pain relief possible, remember these points:    Take pain medications as directed, before pain becomes severe.    Pain medication can upset your stomach: taking it with food may help.    Constipation is a common side effect of pain medication. Drink plenty of  fluids.    Eat foods high in fiber. Take a stool softener if recommended by your doctor or pharmacist.    Do not drink alcohol, drive or operate machinery while taking pain medications.    Ask about other ways to control pain, such as with heat, ice or relaxation.    Tylenol/Acetaminophen Consumption  To help encourage the safe use of acetaminophen, the makers of TYLENOL  have lowered the maximum daily dose for single-ingredient Extra Strength TYLENOL  (acetaminophen) products sold in the [place] from 8 pills per day (4,000 mg) to 6 pills per day (3,000 mg). The dosing interval has also changed from 2 pills every 4-6 hours to 2 pills every 6 hours.    If you feel your pain relief is insufficient, you may take Tylenol/Acetaminophen in addition to your narcotic pain medication.     Be careful not to exceed 3,000 mg of Tylenol/Acetaminophen in a 24 hour period from all sources.    If you are taking extra strength Tylenol/acetaminophen (500 mg), the maximum dose is 6 tablets in 24 hours.    If you are taking regular strength acetaminophen (325 mg),  the maximum dose is 9 tablets in 24 hours.    Call a doctor for any of the followin. Signs of infection (fever, growing tenderness at the surgery site, a large amount of drainage or bleeding, severe pain, foul-smelling drainage, redness, swelling).  2. It has been over 8 to 10 hours since surgery and you are still not able to urinate (pass water).  3. Headache for over 24 hours.  4. Numbness, tingling or weakness the day after surgery (if you had spinal anesthesia).  Your doctor is:       Dr. Tin Guzman, Orthopaedics: [phone]               Or dial [phone] and ask for the resident on call for:  Orthopaedics  For emergency care, call the:  [facility] Emergency Department: [phone] (TTY for hearing impaired: [phone])                [facility]  Home Care Following Anesthesia  For 24 hours after surgery:  10. Get plenty of rest.  A responsible adult must stay with you for at least 24 hours after you leave the surgery center.  11. Do not drive or use heavy equipment.  If you have weakness or tingling, don't drive or use heavy equipment until this feeling goes away.   12. Do not drink alcohol.   13. Avoid strenuous or risky activities.  Ask for help when climbing stairs.  14. You may feel lightheaded.  IF so, sit for a few minutes before standing.  Have someone help you get up.   15. If you have nausea (feel sick to your stomach): Drink only clear liquids such as apple juice, ginger ale, broth or 7-Up.  Rest may also help.  Be sure to drink enough fluids.  Move to a regular diet as you feel able.   16. You may have a slight fever.  Call the doctor if your fever is over 100 F (37.7 C) (taken under the tongue) or lasts longer than 24 hours.  17. You may have a dry mouth, a sore throat, muscle aches or trouble sleeping. These should go away after 24 hours.  18. Do not make important or legal decisions.        Today you received an Exparel block to numb the  "nerves near your surgery site.  This is a block using local anesthetic or \"numbing\" medication injected around the nerves to anesthetize or \"numb\" the area supplied by those nerves.  This block is injected into the muscle layer near your surgical site.  This medication may numb the location where you had surgery up to 72 hours.  If your surgical site is an arm or leg you should be careful with your affected limb, since it is possible to injure your limb without being aware of it due to the numbing.  Until full feeling returns, you should guard against bumping or hitting your limb, and avoid extreme hot or cold temperatures on the skin.  As the block wears off, the feeling will return as a tingling or prickly sensation near your surgical site.  You will experince more discomfort from your incision as the feeling returns.  You may want to take a pain pill (a narcotic or Tylenol if this was prescribed by your surgeon) when you start to experience mild pain before the pain beomes more severe.  If your pain medications do not control your pain, you should notify your surgeon.    Tips for taking pain medications  To get the best pain relief possible, remember these points:    Take pain medications as directed, before pain becomes severe.    Pain medication can upset your stomach: taking it with food may help.    Constipation is a common side effect of pain medication. Drink plenty of  fluids.    Eat foods high in fiber. Take a stool softener if recommended by your doctor or pharmacist.    Do not drink alcohol, drive or operate machinery while taking pain medications.    Ask about other ways to control pain, such as with heat, ice or relaxation.    Tylenol/Acetaminophen Consumption  To help encourage the safe use of acetaminophen, the makers of TYLENOL  have lowered the maximum daily dose for single-ingredient Extra Strength TYLENOL  (acetaminophen) products sold in the [place] from 8 pills per day (4,000 mg) to 6 pills per " day (3,000 mg). The dosing interval has also changed from 2 pills every 4-6 hours to 2 pills every 6 hours.    If you feel your pain relief is insufficient, you may take Tylenol/Acetaminophen in addition to your narcotic pain medication.     Be careful not to exceed 3,000 mg of Tylenol/Acetaminophen in a 24 hour period from all sources.    If you are taking extra strength Tylenol/acetaminophen (500 mg), the maximum dose is 6 tablets in 24 hours.    If you are taking regular strength acetaminophen (325 mg), the maximum dose is 9 tablets in 24 hours.    Call a doctor for any of the followin. Signs of infection (fever, growing tenderness at the surgery site, a large amount of drainage or bleeding, severe pain, foul-smelling drainage, redness, swelling).  6. It has been over 8 to 10 hours since surgery and you are still not able to urinate (pass water).  7. Headache for over 24 hours.    Your doctor is:       Dr. Tin Guzman, Orthopaedics: [phone]               Or dial [phone] and ask for the resident on call for:  Orthopaedics  For emergency care, call the:  [facility] Emergency Department: [phone] (TTY for hearing impaired: [phone])

## 2019-07-12 NOTE — ANESTHESIA PROCEDURE NOTES
Peripheral Nerve Block Procedure Note    Staff:     Anesthesiologist:  Giovanny Ochoa MD    Referred By:  Tin Guzman MD  Location: Pre-op  Procedure Start/Stop TImes:      7/12/2019 2:23 PM     7/12/2019 2:26 PM    patient identified, IV checked, site marked, risks and benefits discussed, informed consent, monitors and equipment checked, pre-op evaluation, at physician/surgeon's request and post-op pain management      Correct Patient: Yes      Correct Position: Yes      Correct Site: Yes      Correct Procedure: Yes      Correct Laterality:  Yes    Site Marked:  Yes  Procedure details:     Procedure:  Popliteal    ASA:  2    Laterality:  Left    Position:  Supine    Sterile Prep: chloraprep, mask and sterile gloves      Local skin infiltration:  None    Needle:  Short bevel and insulated    Needle gauge:  21    Needle length (mm):  110    Ultrasound: Yes      Ultrasound used to identify targeted nerve, plexus, or vascular structure and placed a needle adjacent to it      Permanent Image entered into patiient's record      Abnormal pain on injection: No      Blood Aspirated: No      Paresthesias:  No    Bleeding at site: No      Bolus via:  Needle    Infusion Method:  Single Shot    Complications:  None

## 2019-07-12 NOTE — OP NOTE
DATE OF SURGERY: 7/12/2019    PREOPERATIVE DIAGNOSIS: Left trimalleolar ankle fracture    POSTOPERATIVE DIAGNOSIS: Left trimalleolar ankle fracture    PROCEDURE: Open reduction internal fixation of left medial lateral malleolus.    SURGEON: Tin Guzman MD     ASSISTANT: Marsha Browne MD    PATIENT HISTORY: This patient is a history of a twisting injury to the left ankle less than 2 weeks ago and presents now for internal fixation of her ankle.  She understands the risks of infection bleeding pain numbness tingling stiffness weakness deep venous thrombosis and pulmonary embolism.    DESCRIPTION OF PROCEDURE: Patient was placed supine after general anesthesia in the left leg was washed and sterilely prepped and draped.  I made an incision over the distal fibula on the left side sharply through the skin almost down to bone and then bluntly dissected to expose the fracture site.  The fracture was cleaned and held in reduced position with clamps.  Attempts to place a interfragmentary screw displaced the fracture site and so this was abandoned.  The patient had a large anterior fragment from the distal portion of the fibula that seem to have the entire anterior syndesmotic ligaments attached to it.  I reduce this and held with a clamp and placed a 2 mm screw across it that held in good position.  This allowed us to neutralize the remainder of the fracture and we were able to hold this in excellent position as well.  I placed a 6-hole plate that was contoured across the fracture.  I placed a 3 screws above and 2 below the fracture site all with good purchase.  The fracture was held in excellent position.  We then turned our attention to the medial side.  Made a curved incision anterior and distal to the medial malleolus.  After incising skin sharply dividing subcutaneous tissue with cautery I exposed the fracture and cleaned it out.  I held reduced with a clamp and then placed a 2 guidewires for the 4 oh  cannulated screws across the fracture.  Is happy with the position of the guidewires.  Next I overdrilled the fragment and placed 4 oh cannulated screws 50 mm in length that were half threaded.  As these were tightened down the fracture remained in good position.  Excellent purchase was obtained with these.  All decision rotation was done under C-arm guidance.  We then copiously irrigated both incisions and closed the deep and superficial subcutaneous layers with Vicryl.  Nylon was used in the skin followed by sterile dry dressing and a cam boot.  The patient was extubated and taken to the recovery room in stable condition.  There were no couplets.  I was present for all critical portions of the procedure.  The estimate of blood loss is 5 mL.    Tin Guzman MD

## 2019-07-13 DIAGNOSIS — E03.9 HYPOTHYROIDISM, UNSPECIFIED TYPE: ICD-10-CM

## 2019-07-13 DIAGNOSIS — K21.00 GASTROESOPHAGEAL REFLUX DISEASE WITH ESOPHAGITIS: ICD-10-CM

## 2019-07-13 NOTE — TELEPHONE ENCOUNTER
"Requested Prescriptions   Pending Prescriptions Disp Refills     levothyroxine (SYNTHROID/LEVOTHROID) 150 MCG tablet [Pharmacy Med Name: LEVOTHYROXINE 150 MCG TABLET] 30 tablet 5     Sig: TAKE 1 TABLET (150 MCG) BY MOUTH DAILY       Thyroid Protocol Passed - 7/13/2019 11:34 AM        Passed - Patient is 12 years or older        Passed - Recent (12 mo) or future (30 days) visit within the authorizing provider's specialty     Patient had office visit in the last 12 months or has a visit in the next 30 days with authorizing provider or within the authorizing provider's specialty.  See \"Patient Info\" tab in inbasket, or \"Choose Columns\" in Meds & Orders section of the refill encounter.              Passed - Medication is active on med list        Passed - Normal TSH on file in past 12 months     Recent Labs   Lab Test 02/19/19 1936   TSH 1.68              Passed - No active pregnancy on record     If patient is pregnant or has had a positive pregnancy test, please check TSH.          Passed - No positive pregnancy test in past 12 months     If patient is pregnant or has had a positive pregnancy test, please check TSH.          omeprazole (PRILOSEC) 20 MG DR capsule [Pharmacy Med Name: OMEPRAZOLE DR 20 MG CAPSULE] 30 capsule 3     Sig: TAKE 1 CAPSULE BY MOUTH EVERY DAY       PPI Protocol Failed - 7/13/2019 11:34 AM        Failed - Medication is active on med list        Passed - Not on Clopidogrel (unless Pantoprazole ordered)        Passed - No diagnosis of osteoporosis on record        Passed - Recent (12 mo) or future (30 days) visit within the authorizing provider's specialty     Patient had office visit in the last 12 months or has a visit in the next 30 days with authorizing provider or within the authorizing provider's specialty.  See \"Patient Info\" tab in inbasket, or \"Choose Columns\" in Meds & Orders section of the refill encounter.              Passed - Patient is age 18 or older        Passed - No active " pregnacy on record        Passed - No positive pregnancy test in past 12 months        Last Written Prescription Date:  11/26/18  Last Fill Quantity: 90,  # refills: 1   Last office visit: 7/8/2019 with prescribing provider:  Makeda Bryan     Future Office Visit:

## 2019-07-16 RX ORDER — LEVOTHYROXINE SODIUM 150 UG/1
TABLET ORAL
Qty: 30 TABLET | Refills: 5 | Status: SHIPPED | OUTPATIENT
Start: 2019-07-16 | End: 2019-12-16

## 2019-07-16 NOTE — TELEPHONE ENCOUNTER
For omeprazole:  Routing refill request to provider for review/approval because:  Drug not active on patient's medication list    For levothyroxine:  Prescription approved per G Refill Protocol.          Jose Gutierrez RN, BSN, PHN

## 2019-07-26 ENCOUNTER — OFFICE VISIT (OUTPATIENT)
Dept: ORTHOPEDICS | Facility: CLINIC | Age: 54
End: 2019-07-26
Payer: COMMERCIAL

## 2019-07-26 ENCOUNTER — ANCILLARY PROCEDURE (OUTPATIENT)
Dept: GENERAL RADIOLOGY | Facility: CLINIC | Age: 54
End: 2019-07-26
Attending: PHYSICIAN ASSISTANT
Payer: COMMERCIAL

## 2019-07-26 ENCOUNTER — ANCILLARY PROCEDURE (OUTPATIENT)
Dept: ULTRASOUND IMAGING | Facility: CLINIC | Age: 54
End: 2019-07-26
Attending: ORTHOPAEDIC SURGERY
Payer: COMMERCIAL

## 2019-07-26 DIAGNOSIS — S82.842A CLOSED BIMALLEOLAR FRACTURE OF LEFT ANKLE, INITIAL ENCOUNTER: ICD-10-CM

## 2019-07-26 DIAGNOSIS — S82.842A CLOSED BIMALLEOLAR FRACTURE OF LEFT ANKLE, INITIAL ENCOUNTER: Primary | ICD-10-CM

## 2019-07-26 DIAGNOSIS — S82.892S CLOSED LEFT ANKLE FRACTURE, SEQUELA: ICD-10-CM

## 2019-07-26 RX ORDER — HYDROCODONE BITARTRATE AND ACETAMINOPHEN 5; 325 MG/1; MG/1
1-2 TABLET ORAL EVERY 4 HOURS PRN
Qty: 20 TABLET | Refills: 0 | Status: SHIPPED | OUTPATIENT
Start: 2019-07-26 | End: 2019-09-05

## 2019-07-26 ASSESSMENT — PATIENT HEALTH QUESTIONNAIRE - PHQ9
SUM OF ALL RESPONSES TO PHQ QUESTIONS 1-9: 0
SUM OF ALL RESPONSES TO PHQ QUESTIONS 1-9: 0

## 2019-07-26 NOTE — LETTER
7/26/2019       RE: Aishwarya Savage  [address] [place] [address]     Dear Colleague,    Thank you for referring your patient, Aishwarya Savage, [facility]. Please see a copy of my visit note below.    I was present with the resident during the history and exam.  I discussed the case with the resident and agree with the findings as documented in the assessment and plan.    I refilled her hydrocodone.  She stated that she was only taking this at night.  I would not authorize another refill.  I also talked with her after her ultrasound.  She does not have a deep venous thrombosis but had some nonocclusive superficial thrombosis.  I have recommended hot packs additional anti-inflammatory medications.  She will return in 4 weeks or sooner if she notices increased pain and swelling in the ankle or calf.    Surgery: ORIF Left ankle fx on 7/12 with Dr. Guzman    HPI: Aishwarya Savage is a 54 year old F now 2 weeks s/p the above stated procedure. Patient reports that she has extreme left calf pain. No numbness or tingling. Has been taking her ASA. No problems with the incisions. Wonders when she can start WB. Would also like more pain medications.     Exam: Well appearing female of stated age in no acute distress. Left ankle incisions well approximated, no drainage. Stitches removed.  Tenderness palpation of the medial gastroc. Fires TA/Gsc/EHL/FHL. SILT in sp/dp/s/s/t nerve dist. 2+ dp pulse.     Imaging: 3 views of the patient's left ankle are reviewed today and are remarkable for postoperative changes of the left ankle ORIF.  Stable alignment of hardware.  No interval change in alignment.    A/P:  54-year-old female now 2 weeks status post ORIF left ankle fracture on 7/12.     - stitches removed   - Refill of narcotic pain medication  - LLE ultrasound to eval for DVT , if positive will need to go to the emergency room  - continue  NWB LLE for 4 more weeks in cam boot    - F/u 6 weeks from surgery with repeat L ankle XR 3v       Discussed with Dr. Guzman.     Marsha Browne     Again, thank you for allowing me to participate in the care of your patient.      Sincerely,    Tin Guzman MD

## 2019-07-26 NOTE — PROGRESS NOTES
I was present with the resident during the history and exam.  I discussed the case with the resident and agree with the findings as documented in the assessment and plan.    I refilled her hydrocodone.  She stated that she was only taking this at night.  I would not authorize another refill.  I also talked with her after her ultrasound.  She does not have a deep venous thrombosis but had some nonocclusive superficial thrombosis.  I have recommended hot packs additional anti-inflammatory medications.  She will return in 4 weeks or sooner if she notices increased pain and swelling in the ankle or calf.

## 2019-07-26 NOTE — PROGRESS NOTES
Surgery: ORIF Left ankle fx on 7/12 with Dr. Guzman    HPI: Aishwarya Savage is a 54 year old F now 2 weeks s/p the above stated procedure. Patient reports that she has extreme left calf pain. No numbness or tingling. Has been taking her ASA. No problems with the incisions. Wonders when she can start WB. Would also like more pain medications.     Exam: Well appearing female of stated age in no acute distress. Left ankle incisions well approximated, no drainage. Stitches removed.  Tenderness palpation of the medial gastroc. Fires TA/Gsc/EHL/FHL. SILT in sp/dp/s/s/t nerve dist. 2+ dp pulse.     Imaging: 3 views of the patient's left ankle are reviewed today and are remarkable for postoperative changes of the left ankle ORIF.  Stable alignment of hardware.  No interval change in alignment.    A/P:  54-year-old female now 2 weeks status post ORIF left ankle fracture on 7/12.     - stitches removed   - Refill of narcotic pain medication  - LLE ultrasound to eval for DVT , if positive will need to go to the emergency room  - continue NWB LLE for 4 more weeks in cam boot    - F/u 6 weeks from surgery with repeat L ankle XR 3v       Discussed with Dr. Guzman.     Marsha Browne

## 2019-07-26 NOTE — NURSING NOTE
Reason For Visit:   Chief Complaint   Patient presents with     Surgical Followup     States she is here for internal fixation of left ankle medial lateral malleolus on 7/12/19        Primary MD: Makeda Bryan  Referring MD: Makeda Bryan      Pain Assessment  Patient Currently in Pain: Yes  0-10 Pain Scale: 6  Primary Pain Location: Ankle  Other Pain Locations: leg  Pain Descriptors: Discomfort    Current Outpatient Medications   Medication     albuterol (PROAIR HFA/PROVENTIL HFA/VENTOLIN HFA) 108 (90 Base) MCG/ACT inhaler     aspirin (ASA) 325 MG tablet     cetirizine (ZYRTEC) 10 MG tablet     FLUoxetine (PROZAC) 20 MG capsule     HYDROcodone-acetaminophen (NORCO) 5-325 MG tablet     IBUPROFEN 200 MG OR TABS     levothyroxine (SYNTHROID/LEVOTHROID) 150 MCG tablet     methocarbamol (ROBAXIN) 750 MG tablet     Multiple Vitamins-Minerals (MULTIVITAMIN ADULT PO)     naproxen (NAPROSYN) 500 MG tablet     omeprazole (PRILOSEC) 20 MG DR capsule     No current facility-administered medications for this visit.           Allergies   Allergen Reactions     Clindamycin Swelling     face     [facility]           Clarice Stokes LPN

## 2019-07-27 ASSESSMENT — PATIENT HEALTH QUESTIONNAIRE - PHQ9: SUM OF ALL RESPONSES TO PHQ QUESTIONS 1-9: 0

## 2019-07-29 ENCOUNTER — TELEPHONE (OUTPATIENT)
Dept: ORTHOPEDICS | Facility: CLINIC | Age: 54
End: 2019-07-29

## 2019-07-29 NOTE — TELEPHONE ENCOUNTER
[facility]    Phone Message    May a detailed message be left on voicemail: yes    Reason for Call: Other: Aishwarya is calling to schedule her 4 week f/u.   4 weeks would be 8/23.  she was requesting to be seen on 8/21.  But the first available is coming up as 8/28.   Aishwarya wanted to make sure that Dr. Guzman is ok with her waiting for 8/28 as he told her 4 weeks out.  Please call her back to discuss and schedule.      Action Taken: Message routed to:  [facility] ([facility] ortho

## 2019-08-12 NOTE — TELEPHONE ENCOUNTER
Called patient back and explained that there is a hold for 12:30 on the 21st with Dr. Guzman and to give us a call back if she would like that time

## 2019-08-12 NOTE — TELEPHONE ENCOUNTER
[facility]    Phone Message    May a detailed message be left on voicemail: yes    Reason for Call: Other: Aishwarya calling to double check.   She said she spoke with someone and scheduled on 8/21....and she looked on mychart and its scheduled for 8/28.  Please call her back to discuss as she really wants to be out of the boot on 8/21.       Action Taken: Message routed to:  [facility] ortho

## 2019-08-15 DIAGNOSIS — S82.842A CLOSED BIMALLEOLAR FRACTURE OF LEFT ANKLE, INITIAL ENCOUNTER: Primary | ICD-10-CM

## 2019-08-20 DIAGNOSIS — S82.842A CLOSED BIMALLEOLAR FRACTURE OF LEFT ANKLE: Primary | ICD-10-CM

## 2019-08-21 ENCOUNTER — OFFICE VISIT (OUTPATIENT)
Dept: ORTHOPEDICS | Facility: CLINIC | Age: 54
End: 2019-08-21
Payer: COMMERCIAL

## 2019-08-21 ENCOUNTER — ANCILLARY PROCEDURE (OUTPATIENT)
Dept: GENERAL RADIOLOGY | Facility: CLINIC | Age: 54
End: 2019-08-21
Attending: ORTHOPAEDIC SURGERY
Payer: COMMERCIAL

## 2019-08-21 DIAGNOSIS — S82.842D CLOSED BIMALLEOLAR FRACTURE OF LEFT ANKLE WITH ROUTINE HEALING, SUBSEQUENT ENCOUNTER: Primary | ICD-10-CM

## 2019-08-21 DIAGNOSIS — S82.842A CLOSED BIMALLEOLAR FRACTURE OF LEFT ANKLE: ICD-10-CM

## 2019-08-21 RX ORDER — ASPIRIN 325 MG
325 TABLET ORAL DAILY
COMMUNITY
End: 2020-07-31

## 2019-08-21 NOTE — PROGRESS NOTES
This 54-year-old woman is about 6 weeks out from repair of a bimalleolar left ankle fracture.  She is been nonweightbearing.    X-rays today show that the ankle mortise is symmetric.  There is signs of healing.    We will allow her to begin weightbearing as tolerated in a boot.  She will wean out of the boot over 2weeks.  I will return to see her as needed if she has increased pain or fails to progress to full ambulation.  She is working with a physical therapist.  I have answered all of her questions.

## 2019-08-21 NOTE — LETTER
8/21/2019       RE: Aishwarya Savage  [address] [place] [address]     Dear Colleague,    Thank you for referring your patient, Aishwarya Savage, to [facility]. Please see a copy of my visit note below.    This 54-year-old woman is about 6 weeks out from repair of a bimalleolar left ankle fracture.  She is been nonweightbearing.    X-rays today show that the ankle mortise is symmetric.  There is signs of healing.    We will allow her to begin weightbearing as tolerated in a boot.  She will wean out of the boot over 2weeks.  I will return to see her as needed if she has increased pain or fails to progress to full ambulation.  She is working with a physical therapist.  I have answered all of her questions.    Again, thank you for allowing me to participate in the care of your patient.      Sincerely,    Tin Guzman MD

## 2019-08-21 NOTE — NURSING NOTE
Reason For Visit:   Chief Complaint   Patient presents with     Surgical Followup     Internal Fixation Left Ankle Medial Lateral Malleolus - Left, DOS: 7-.       Pain Assessment  Patient Currently in Pain: Yes  0-10 Pain Scale: 4  Primary Pain Location: Foot(Left )  Aggravating Factors: Walking(pressure)        Allergies   Allergen Reactions     Clindamycin Swelling     face     [facility] Hivkimberly Perry LPN

## 2019-08-26 ENCOUNTER — THERAPY VISIT (OUTPATIENT)
Dept: PHYSICAL THERAPY | Facility: CLINIC | Age: 54
End: 2019-08-26
Payer: COMMERCIAL

## 2019-08-26 DIAGNOSIS — M25.572 PAIN IN JOINT, ANKLE AND FOOT, LEFT: ICD-10-CM

## 2019-08-26 DIAGNOSIS — S82.842A CLOSED BIMALLEOLAR FRACTURE OF LEFT ANKLE, INITIAL ENCOUNTER: ICD-10-CM

## 2019-08-26 PROCEDURE — 97161 PT EVAL LOW COMPLEX 20 MIN: CPT | Mod: GP | Performed by: PHYSICAL THERAPIST

## 2019-08-26 PROCEDURE — 97110 THERAPEUTIC EXERCISES: CPT | Mod: GP | Performed by: PHYSICAL THERAPIST

## 2019-08-26 NOTE — PROGRESS NOTES
[facility] Initial Evaluation  Subjective:  The history is provided by the patient. No  was used.   Type of problem:  Left ankle   Condition occurred with:  A jump and a wrong landing.    Problem details: Patient had surgery on 7/12/19 and was just cleared by MD on 8/21/19 to walk without cam boot. Pt has been self weaning off of boot. .   Patient reports pain:  Medial, lateral and joint. Radiates to:  No radiation. Associated symptoms:  Loss of motion/stiffness, loss of strength and edema. Symptoms are exacerbated by activity, ascending stairs, descending stairs, standing, weight bearing and walking and relieved by ice, NSAID's, rest and bracing/immobilizing (Has a cam boot that she is weaning out of).    Aishwarya Savage being seen for s/p left ankle ORIF secondary to bimalleloar fracture.   Where condition occurred: at home.Problem occurred: She [place] and landed in 3ft of water. She had pain when her foot hit the ground d/t a bad landing.   and reported as 9/10 on pain scale.  Pertinent medical history includes:  Depression, mental illness, fibromyalgia, thyroid problems, history of fractures, overweight and menopausal.    Surgeries include:  Orthopedic surgery. Other surgery history details: ORIF of left ankle.  Current medications:  Thyroid medication and anti-depressants.   Primary job tasks include:  Prolonged standing, lifting/carrying, pushing/pulling and repetitive tasks.  Pain is described as sharp and is intermittent. Pain is worse during the day. Since onset symptoms are unchanged.      Patient is Self employed; childcare provider. Restrictions include:  Working in normal job without restrictions.    Barriers include:  None as reported by patient.  Red flags:  Calf pain-swelling-warmth (Hx of DVT in popliteal vein.).                      Objective:  Standing Alignment:            Hip:  Normal  Knee:  Normal      Gait:    Gait Type:   Antalgic   Weight Bearing Status:  WBAT   Assistive Devices:  None  Deviations:  Ankle:  Heel strike decr L    Flexibility/Screens:   Negative screens: Knee               Ankle/Foot Evaluation  ROM:    AROM:    Dorsiflexion:  Left:   0  Right:   15  Plantarflexion:  Left:  40    Right:  60  Inversion:  Left:  5%     Right:  WNL  Eversion:  5%     Right:  WNL      PROM:                Pain: No pain with passive movement  Endfeel:  Hard end-feel with DF  Strength:    Dorsiflexion:  Left: 3+/5      Pain:++   Right: 5/5   Pain:  Plantarflexion: Left: 4/5    Pain:++   Right: 5/5  Pain:  Inversion:Left: 3+/5   Pain:++     Right: 4/5  Pain:  Eversion:Left: 3+/5   Pain:++  Right: 4+/5  Pain:                  LIGAMENT TESTING: not assessed              SPECIAL TESTS:     Left ankle negative for the following special tests:  Soniya's sign    Right ankle negative for the following special tests:  Soniya's sign  PALPATION:   Left ankle tenderness present at:  plantar fascia; medial calcaneal; medial malleolus and lateral malleolus  Left ankle tenderness not present at:   achilles tendon or anterior talofibular ligament    Right ankle tenderness not present at:  anterior talofibular ligament  EDEMA: Edema ankle: 10cm above joint line on L: 29cm, R: 27cm.  Left ankle edema present at: general and 56.5cm  Right ankle edema present at:  52cm      Figure 8 left: 56.5cmFigure 8 right: 52cm  MOBILITY TESTING:       Talocrural Left: hypomobile    Talocrural Right: normal  Subtalar Left: hypomobile    Subtalar Right: normal  Midtarsal Left: hypomobile    Midtarsal Right: normal  First Ray Left: hypomobile    First Ray Right: normal  FUNCTIONAL TESTS: Functional test ankle: SL balance on L: 5 sec without UE support, on R 20 sec without UE support.                                                              General     ROS    Assessment/Plan:    Patient is a 54 year old female with left side ankle complaints.    Patient has the following  significant findings with corresponding treatment plan.                Diagnosis 1:  Left ankle s/p ORIF secondary to bimalleolar fx  Pain -  hot/cold therapy, US, electric stimulation, manual therapy, self management, education and home program  Decreased ROM/flexibility - manual therapy, therapeutic exercise and home program  Decreased joint mobility - manual therapy, therapeutic exercise and home program  Decreased strength - therapeutic exercise, therapeutic activities and home program  Impaired balance - neuro re-education, therapeutic activities and home program  Decreased proprioception - neuro re-education, therapeutic activities and home program  Edema - vasopneumatics, electric stimulation, cold therapy and self management/home program  Impaired gait - gait training and home program  Decreased function - therapeutic activities and home program    Therapy Evaluation Codes:   1) History comprised of:   Personal factors that impact the plan of care:      Coping style, Living environment and Profession.    Comorbidity factors that impact the plan of care are:      Depression, Fibromyalgia, Mental illness, Overweight and hx of fractures.     Medications impacting care: Anti-depressant and thyroid.  2) Examination of Body Systems comprised of:   Body structures and functions that impact the plan of care:      Ankle.   Activity limitations that impact the plan of care are:      Jumping, Running, Squatting/kneeling, Stairs, Standing, Walking, Working and Sleeping.  3) Clinical presentation characteristics are:   Stable/Uncomplicated.  4) Decision-Making    Low complexity using standardized patient assessment instrument and/or measureable assessment of functional outcome.  Cumulative Therapy Evaluation is: Low complexity.    Previous and current functional limitations:  (See Goal Flow Sheet for this information)    Short term and Long term goals: (See Goal Flow Sheet for this information)     Communication ability:   Patient appears to be able to clearly communicate and understand verbal and written communication and follow directions correctly.  Treatment Explanation - The following has been discussed with the patient:   RX ordered/plan of care  Anticipated outcomes  Possible risks and side effects  This patient would benefit from PT intervention to resume normal activities.   Rehab potential is excellent.    Frequency:  1 X week, once daily  Duration:  for 8 weeks  Discharge Plan:  Achieve all LTG.  Independent in home treatment program.  Reach maximal therapeutic benefit.    Please refer to the daily flowsheet for treatment today, total treatment time and time spent performing 1:1 timed codes.

## 2019-08-28 ENCOUNTER — MYC MEDICAL ADVICE (OUTPATIENT)
Dept: FAMILY MEDICINE | Facility: CLINIC | Age: 54
End: 2019-08-28

## 2019-09-04 ENCOUNTER — THERAPY VISIT (OUTPATIENT)
Dept: PHYSICAL THERAPY | Facility: CLINIC | Age: 54
End: 2019-09-04
Payer: COMMERCIAL

## 2019-09-04 DIAGNOSIS — M25.572 PAIN IN JOINT, ANKLE AND FOOT, LEFT: ICD-10-CM

## 2019-09-04 PROCEDURE — 97112 NEUROMUSCULAR REEDUCATION: CPT | Mod: GP | Performed by: PHYSICAL THERAPIST

## 2019-09-04 PROCEDURE — 97110 THERAPEUTIC EXERCISES: CPT | Mod: GP | Performed by: PHYSICAL THERAPIST

## 2019-09-05 ENCOUNTER — MYC REFILL (OUTPATIENT)
Dept: ORTHOPEDICS | Facility: CLINIC | Age: 54
End: 2019-09-05

## 2019-09-05 DIAGNOSIS — S82.892S CLOSED LEFT ANKLE FRACTURE, SEQUELA: ICD-10-CM

## 2019-09-06 RX ORDER — HYDROCODONE BITARTRATE AND ACETAMINOPHEN 5; 325 MG/1; MG/1
1-2 TABLET ORAL EVERY 4 HOURS PRN
Qty: 20 TABLET | Refills: 0 | Status: SHIPPED | OUTPATIENT
Start: 2019-09-06 | End: 2019-09-15

## 2019-09-15 ENCOUNTER — MYC REFILL (OUTPATIENT)
Dept: ORTHOPEDICS | Facility: CLINIC | Age: 54
End: 2019-09-15

## 2019-09-15 DIAGNOSIS — S82.892S CLOSED LEFT ANKLE FRACTURE, SEQUELA: ICD-10-CM

## 2019-09-16 RX ORDER — HYDROCODONE BITARTRATE AND ACETAMINOPHEN 5; 325 MG/1; MG/1
1-2 TABLET ORAL EVERY 4 HOURS PRN
Qty: 20 TABLET | Refills: 0 | Status: SHIPPED | OUTPATIENT
Start: 2019-09-16 | End: 2019-12-16

## 2019-09-25 ENCOUNTER — THERAPY VISIT (OUTPATIENT)
Dept: PHYSICAL THERAPY | Facility: CLINIC | Age: 54
End: 2019-09-25
Payer: COMMERCIAL

## 2019-09-25 DIAGNOSIS — M25.572 PAIN IN JOINT, ANKLE AND FOOT, LEFT: ICD-10-CM

## 2019-09-25 PROCEDURE — 97110 THERAPEUTIC EXERCISES: CPT | Mod: GP | Performed by: PHYSICAL THERAPIST

## 2019-09-25 PROCEDURE — 97140 MANUAL THERAPY 1/> REGIONS: CPT | Mod: GP | Performed by: PHYSICAL THERAPIST

## 2019-09-25 PROCEDURE — 97112 NEUROMUSCULAR REEDUCATION: CPT | Mod: GP | Performed by: PHYSICAL THERAPIST

## 2019-10-02 ENCOUNTER — THERAPY VISIT (OUTPATIENT)
Dept: PHYSICAL THERAPY | Facility: CLINIC | Age: 54
End: 2019-10-02
Payer: COMMERCIAL

## 2019-10-02 DIAGNOSIS — M25.572 PAIN IN JOINT, ANKLE AND FOOT, LEFT: ICD-10-CM

## 2019-10-02 PROCEDURE — 97140 MANUAL THERAPY 1/> REGIONS: CPT | Mod: GP | Performed by: PHYSICAL THERAPIST

## 2019-10-02 PROCEDURE — 97112 NEUROMUSCULAR REEDUCATION: CPT | Mod: GP | Performed by: PHYSICAL THERAPIST

## 2019-10-02 PROCEDURE — 97110 THERAPEUTIC EXERCISES: CPT | Mod: GP | Performed by: PHYSICAL THERAPIST

## 2019-10-21 ENCOUNTER — THERAPY VISIT (OUTPATIENT)
Dept: PHYSICAL THERAPY | Facility: CLINIC | Age: 54
End: 2019-10-21
Payer: COMMERCIAL

## 2019-10-21 DIAGNOSIS — M25.572 PAIN IN JOINT, ANKLE AND FOOT, LEFT: ICD-10-CM

## 2019-10-21 PROCEDURE — 97112 NEUROMUSCULAR REEDUCATION: CPT | Mod: GP | Performed by: PHYSICAL THERAPIST

## 2019-10-21 PROCEDURE — 97110 THERAPEUTIC EXERCISES: CPT | Mod: GP | Performed by: PHYSICAL THERAPIST

## 2019-10-21 PROCEDURE — 97140 MANUAL THERAPY 1/> REGIONS: CPT | Mod: GP | Performed by: PHYSICAL THERAPIST

## 2019-11-15 ENCOUNTER — MYC MEDICAL ADVICE (OUTPATIENT)
Dept: FAMILY MEDICINE | Facility: CLINIC | Age: 54
End: 2019-11-15

## 2019-12-06 PROBLEM — M25.572 PAIN IN JOINT, ANKLE AND FOOT, LEFT: Status: RESOLVED | Noted: 2019-08-26 | Resolved: 2019-12-06

## 2019-12-06 NOTE — PROGRESS NOTES
Discharge Note    Progress reporting period is from initial evaluation date (please see noted date below) to Oct 21, 2019.  Linked Episodes   Type: Episode: Status: Noted: Resolved: Last update: Updated by:   PHYSICAL THERAPY Left ankle s/p ORIF 8/26/19 Active 8/26/2019  10/21/2019  6:27 PM Anamaria Horne PT      Comments:       Aishwarya failed to follow up and current status is unknown.  Please see information below for last relevant information on current status.  Patient seen for 5 visits.    SUBJECTIVE  Subjective changes noted by patient:  Pt reports that she is doing great. Pain is gone for the most part, except for the occasional pinch in the foot.   .  Current pain level is 1/10.     Previous pain level was  9/10.   Changes in function:  Yes (See Goal flowsheet attached for changes in current functional level)  Adverse reaction to treatment or activity: None    OBJECTIVE  Changes noted in objective findings: L DF= 13 deg, PF=55 deg. Gait: Pt needs verbal cuing to push-off with L foot. With cuing, pt's gait form improves significantly.     ASSESSMENT/PLAN  Diagnosis: Left ankle s/p ORIF   Updated problem list and treatment plan:   Pain - HEP  STG/LTGs have been met or progress has been made towards goals:  Yes, please see goal flowsheet for most current information  Assessment of Progress: current status is unknown.    Last current status: Pt is progressing well   Self Management Plans:  HEP  I have re-evaluated this patient and find that the nature, scope, duration and intensity of the therapy is appropriate for the medical condition of the patient.  Aishwarya continues to require the following intervention to meet STG and LTG's:  HEP.    Recommendations:  Discharge with current home program.  Patient to follow up with MD as needed.    Please refer to the daily flowsheet for treatment today, total treatment time and time spent performing 1:1 timed codes.

## 2019-12-16 ENCOUNTER — OFFICE VISIT (OUTPATIENT)
Dept: FAMILY MEDICINE | Facility: CLINIC | Age: 54
End: 2019-12-16
Payer: COMMERCIAL

## 2019-12-16 VITALS
RESPIRATION RATE: 18 BRPM | TEMPERATURE: 98 F | HEIGHT: 64 IN | HEART RATE: 74 BPM | OXYGEN SATURATION: 97 % | DIASTOLIC BLOOD PRESSURE: 76 MMHG | SYSTOLIC BLOOD PRESSURE: 136 MMHG | BODY MASS INDEX: 30.66 KG/M2

## 2019-12-16 DIAGNOSIS — E03.4 HYPOTHYROIDISM DUE TO ACQUIRED ATROPHY OF THYROID: ICD-10-CM

## 2019-12-16 DIAGNOSIS — B00.9 HSV (HERPES SIMPLEX VIRUS) INFECTION: ICD-10-CM

## 2019-12-16 DIAGNOSIS — F43.23 SITUATIONAL MIXED ANXIETY AND DEPRESSIVE DISORDER: ICD-10-CM

## 2019-12-16 DIAGNOSIS — R05.3 COUGH, PERSISTENT: ICD-10-CM

## 2019-12-16 DIAGNOSIS — G47.33 OSA (OBSTRUCTIVE SLEEP APNEA): ICD-10-CM

## 2019-12-16 DIAGNOSIS — Z00.00 ROUTINE GENERAL MEDICAL EXAMINATION AT A HEALTH CARE FACILITY: Primary | ICD-10-CM

## 2019-12-16 DIAGNOSIS — S82.842D CLOSED DISPLACED BIMALLEOLAR FRACTURE OF LEFT LOWER LEG WITH ROUTINE HEALING: ICD-10-CM

## 2019-12-16 DIAGNOSIS — F41.1 GAD (GENERALIZED ANXIETY DISORDER): ICD-10-CM

## 2019-12-16 DIAGNOSIS — Z12.4 SCREENING FOR MALIGNANT NEOPLASM OF CERVIX: ICD-10-CM

## 2019-12-16 PROCEDURE — 90471 IMMUNIZATION ADMIN: CPT | Performed by: FAMILY MEDICINE

## 2019-12-16 PROCEDURE — 99396 PREV VISIT EST AGE 40-64: CPT | Mod: 25 | Performed by: FAMILY MEDICINE

## 2019-12-16 PROCEDURE — 90682 RIV4 VACC RECOMBINANT DNA IM: CPT | Performed by: FAMILY MEDICINE

## 2019-12-16 PROCEDURE — 84443 ASSAY THYROID STIM HORMONE: CPT | Performed by: FAMILY MEDICINE

## 2019-12-16 PROCEDURE — G0476 HPV COMBO ASSAY CA SCREEN: HCPCS | Performed by: FAMILY MEDICINE

## 2019-12-16 PROCEDURE — 36415 COLL VENOUS BLD VENIPUNCTURE: CPT | Performed by: FAMILY MEDICINE

## 2019-12-16 PROCEDURE — 99214 OFFICE O/P EST MOD 30 MIN: CPT | Mod: 25 | Performed by: FAMILY MEDICINE

## 2019-12-16 PROCEDURE — G0145 SCR C/V CYTO,THINLAYER,RESCR: HCPCS | Performed by: FAMILY MEDICINE

## 2019-12-16 RX ORDER — ACYCLOVIR 50 MG/G
OINTMENT TOPICAL
Qty: 15 G | Refills: 1 | Status: SHIPPED | OUTPATIENT
Start: 2019-12-16 | End: 2020-03-04

## 2019-12-16 RX ORDER — ALBUTEROL SULFATE 90 UG/1
1-2 AEROSOL, METERED RESPIRATORY (INHALATION) EVERY 6 HOURS PRN
Qty: 1 INHALER | Refills: 3 | Status: SHIPPED | OUTPATIENT
Start: 2019-12-16 | End: 2021-01-27

## 2019-12-16 RX ORDER — LEVOTHYROXINE SODIUM 150 UG/1
150 TABLET ORAL DAILY
Qty: 90 TABLET | Refills: 1 | Status: SHIPPED | OUTPATIENT
Start: 2019-12-16 | End: 2020-07-09

## 2019-12-16 ASSESSMENT — PAIN SCALES - GENERAL: PAINLEVEL: EXTREME PAIN (8)

## 2019-12-17 LAB — TSH SERPL DL<=0.005 MIU/L-ACNC: 2.43 MU/L (ref 0.4–4)

## 2019-12-17 NOTE — PROGRESS NOTES
SUBJECTIVE:   CC: Aishwarya Savage is an 54 year old woman who presents for preventive health visit.     Healthy Habits:    Do you get at least three servings of calcium containing foods daily (dairy, green leafy vegetables, etc.)? yes    Amount of exercise or daily activities, outside of work: None outside of work    Problems taking medications regularly No    Medication side effects: No    Have you had an eye exam in the past two years? yes    Do you see a dentist twice per year? no    Do you have sleep apnea, excessive snoring or daytime drowsiness?yes      Anxiety Follow-Up    How are you doing with your anxiety since your last visit? No change    Are you having other symptoms that might be associated with anxiety? No    Have you had a significant life event? Relationship Concerns, Health Concerns and OTHER:  continues to acuse her of infedelity, he has bipolar disorder     Are you feeling depressed? No    Do you have any concerns with your use of alcohol or other drugs? No    Social History     Tobacco Use     Smoking status: Never Smoker     Smokeless tobacco: Never Used   Substance Use Topics     Alcohol use: Yes     Alcohol/week: 2.0 standard drinks     Types: 2 Standard drinks or equivalent per week     Comment: Socially     Drug use: No     HARLEY-7 SCORE 10/11/2016 11/30/2017 12/11/2018   Total Score 2 1 6     PHQ 11/30/2017 12/11/2018 7/26/2019   PHQ-9 Total Score 6 6 0   Q9: Thoughts of better off dead/self-harm past 2 weeks Not at all Not at all Not at all         Hypothyroidism Follow-up      Since last visit, patient describes the following symptoms: Weight stable, no hair loss, no skin changes, no constipation, no loose stools    Chronic Pain Follow-Up       Type / Location of Pain: shoulders, hips, knee and left ankle  Analgesia/pain control:       Recent changes:  worse      Overall control: Tolerable with discomfort  Activity level/function:      Daily activities:  Able to do moderate  activities    Work:  Pain does not limit any  work  Adverse effects:  No  Adherance    Taking medication as directed?  Yes    Participating in other treatments: yes  Risk Factors:    Sleep:  Fair    Mood/anxiety:  controlled    Recent family or social stressors:  conflict between family members    Other aggravating factors: recovering from left ankle fracture  PHQ-9 SCORE 11/30/2017 12/11/2018 7/26/2019   PHQ-9 Total Score - - -   PHQ-9 Total Score MyChart - - 0   PHQ-9 Total Score 6 6 0     HARLEY-7 SCORE 10/11/2016 11/30/2017 12/11/2018   Total Score 2 1 6     Encounter-Level CSA:    There are no encounter-level csa.     Patient-Level CSA:    There are no patient-level csa.         Today's PHQ-2 Score:   PHQ-2 ( 1999 Pfizer) 12/16/2019 7/10/2019   Q1: Little interest or pleasure in doing things 0 0   Q2: Feeling down, depressed or hopeless 0 0   PHQ-2 Score 0 0   Q1: Little interest or pleasure in doing things - -   Q2: Feeling down, depressed or hopeless - -   PHQ-2 Score - -       Abuse: Current or Past(Physical, Sexual or Emotional)- No  Do you feel safe in your environment? Yes    Have you ever done Advance Care Planning? (For example, a Health Directive, POLST, or a discussion with a medical provider or your loved ones about your wishes): No, advance care planning information given to patient to review.  Patient declined advance care planning discussion at this time.    Social History     Tobacco Use     Smoking status: Never Smoker     Smokeless tobacco: Never Used   Substance Use Topics     Alcohol use: Yes     Alcohol/week: 2.0 standard drinks     Types: 2 Standard drinks or equivalent per week     Comment: Socially     If you drink alcohol do you typically have >3 drinks per day or >7 drinks per week? No                     Reviewed orders with patient.  Reviewed health maintenance and updated orders accordingly - Yes  Lab work is in process  Labs reviewed in EPIC  BP Readings from Last 3 Encounters:    12/16/19 136/76   07/12/19 (!) 133/95   07/08/19 142/80    Wt Readings from Last 3 Encounters:   07/12/19 81.6 kg (180 lb)   11/28/17 81.6 kg (180 lb)   10/25/16 81.2 kg (179 lb)                  Patient Active Problem List   Diagnosis     Hypothyroidism     HSV (herpes simplex virus) infection     Depression     Glaucoma suspect     HARLEY (generalized anxiety disorder)     CHEYANNE (obstructive sleep apnea)- mild (AHI 6)     Fibromyalgia     Hyperlipidemia LDL goal <160     Situational mixed anxiety and depressive disorder     Acute seasonal allergic rhinitis due to pollen     Current mild episode of major depressive disorder without prior episode (H)     Closed displaced bimalleolar fracture of left lower leg with routine healing     Past Surgical History:   Procedure Laterality Date     CL AFF SURGICAL PATHOLOGY  2001     COLONOSCOPY  2/21/2014    Procedure: COLONOSCOPY;  Colonoscopy, screening;  Surgeon: Micheal Webb MD;  Location: [facility] OR     HC EXCISE HAND/FOOT NEUROMA  2009, 2010    Rt Foot     HC TOOTH EXTRACTION W/FORCEP       OPEN REDUCTION INTERNAL FIXATION ANKLE Left 7/12/2019    Procedure: Internal Fixation Left Ankle Medial Lateral Malleolus;  Surgeon: Tin Guzman MD;  Location: [facility] OR     TUBAL LIGATION  1/91       Social History     Tobacco Use     Smoking status: Never Smoker     Smokeless tobacco: Never Used   Substance Use Topics     Alcohol use: Yes     Alcohol/week: 2.0 standard drinks     Types: 2 Standard drinks or equivalent per week     Comment: Socially     Family History   Problem Relation Age of Onset     Cancer Maternal Grandmother         lung     Cancer Mother         lung     Blood Disease Mother         nonhodgkins lymphoma     Thyroid Disease Mother         hypothyroid     Diabetes Mother         Type 2     Diabetes Father         2     Hypertension Father      C.A.D. Father         ? MI at 69     Cardiovascular Paternal Grandfather         AAA     Breast Cancer Other       Glaucoma Paternal Grandmother      Diabetes Sister         diabetic     Cerebrovascular Disease Maternal Aunt      Macular Degeneration No family hx of          Current Outpatient Medications   Medication Sig Dispense Refill     acyclovir (ZOVIRAX) 5 % external ointment Apply topically 6 times daily 15 g 1     albuterol (PROAIR HFA/PROVENTIL HFA/VENTOLIN HFA) 108 (90 Base) MCG/ACT inhaler Inhale 1-2 puffs into the lungs every 6 hours as needed for shortness of breath / dyspnea or wheezing 1 Inhaler 3     cetirizine (ZYRTEC) 10 MG tablet TAKE 1 TABLET BY MOUTH EVERY DAY 60 tablet 3     FLUoxetine (PROZAC) 20 MG capsule Take 1 capsule (20 mg) by mouth daily 90 capsule 3     IBUPROFEN 200 MG OR TABS 1 TABLET EVERY 4 TO 6 HOURS AS NEEDED       levothyroxine (SYNTHROID/LEVOTHROID) 150 MCG tablet Take 1 tablet (150 mcg) by mouth daily 90 tablet 1     aspirin (ASA) 325 MG tablet Take 325 mg by mouth daily       Multiple Vitamins-Minerals (MULTIVITAMIN ADULT PO) Take 1 tablet by mouth daily       Allergies   Allergen Reactions     Clindamycin Swelling     face     Sulfa Drugs Hives     Recent Labs   Lab Test 02/19/19  1936 12/06/18  1757  10/25/16  1834 10/28/15  1554  10/28/14  1925  11/21/11  1842   LDL  --  109*  --  102*  --   --   --   --  97   HDL  --  61  --  53  --   --   --   --  55   TRIG  --  57  --   --   --   --   --   --  82   ALT  --   --   --   --  31  --  34  --   --    CR 0.78  --   --   --  0.73  --  0.88   < >  --    GFRESTIMATED 84  --   --   --  84  --  68   < >  --    GFRESTBLACK >90  --   --   --  >90  African American GFR Calc    --  82   < >  --    POTASSIUM 3.9  --   --   --  3.7  --  4.3   < >  --    TSH 1.68 0.46   < > 0.45 0.51   < >  --    < > 0.60    < > = values in this interval not displayed.        Mammogram Screening: Patient over age 50, mutual decision to screen reflected in health maintenance.    Pertinent mammograms are reviewed under the imaging tab.  History of abnormal Pap smear:  "NO - age 30-65 PAP every 5 years with negative HPV co-testing recommended  PAP / HPV 12/23/2014 11/21/2011 9/28/2009   PAP NIL NIL NIL     Reviewed and updated as needed this visit by clinical staff  Tobacco  Allergies  Meds  Med Hx  Surg Hx  Fam Hx  Soc Hx        Reviewed and updated as needed this visit by Provider            ROS:  CONSTITUTIONAL: NEGATIVE for fever, chills, change in weight  INTEGUMENTARY/SKIN: NEGATIVE for worrisome rashes, moles or lesions  EYES: NEGATIVE for vision changes or irritation  ENT: NEGATIVE for ear, mouth and throat problems  RESP: NEGATIVE for significant cough or SOB  BREAST: NEGATIVE for masses, tenderness or discharge  CV: NEGATIVE for chest pain, palpitations or peripheral edema  GI: NEGATIVE for nausea, abdominal pain, heartburn, or change in bowel habits  : NEGATIVE for unusual urinary or vaginal symptoms. No vaginal bleeding.  MUSCULOSKELETAL: NEGATIVE for significant arthralgias or myalgia  NEURO: NEGATIVE for weakness, dizziness or paresthesias  PSYCHIATRIC: NEGATIVE for changes in mood or affect     OBJECTIVE:   /76 (BP Location: Right arm, Patient Position: Sitting, Cuff Size: Adult Large)   Pulse 74   Temp 98  F (36.7  C) (Oral)   Resp 18   Ht 1.632 m (5' 4.25\")   LMP 02/24/2014 (LMP Unknown)   SpO2 97%   Breastfeeding No   BMI 30.66 kg/m    EXAM:  GENERAL APPEARANCE: healthy, alert and no distress, isobese  EYES: Eyes grossly normal to inspection, PERRL and conjunctivae and sclerae normal  HENT: ear canals and TM's normal, nose and mouth without ulcers or lesions, oropharynx clear and oral mucous membranes moist  NECK: no adenopathy, no asymmetry, masses, or scars and thyroid normal to palpation  RESP: lungs clear to auscultation - no rales, rhonchi or wheezes  CV: regular rates and rhythm, normal S1 S2, no S3 or S4, no murmur, click or rub, no peripheral edema and peripheral pulses strong  ABDOMEN: soft, nontender, no hepatosplenomegaly, no " masses and bowel sounds normal  MS: no musculoskeletal defects are noted and gait is age appropriate without ataxia, left ankle with scar and decreased ROM consistent with recent fracture. Normal pulses.   SKIN: no suspicious lesions or rashes  NEURO: Normal strength and tone, sensory exam grossly normal, cranial nerves intact, reflexes normal.  PSYCH: mentation appears normal and affect normal/bright   BREAST: Normal appearance symmetric with no retractions, no palpable lesions or masses, normal nipples with no discharge, no axillary adenopathy.   GYN: External genitalia normal with no rash or lesions, Cervix normal, vaginal mucosa pink with normal rugae, no discharge, uterus normal size, shape and position, adnexa non-tender with no masses.       Diagnostic Test Results:  Labs reviewed in Epic    ASSESSMENT/PLAN:       ICD-10-CM    1. Routine general medical examination at a health care facility Z00.00 Doing pretty well but had a rough summer after her ankle fracture and issues with  still happening.    2. Cough, persistent R05 albuterol (PROAIR HFA/PROVENTIL HFA/VENTOLIN HFA) 108 (90 Base) MCG/ACT inhaler   3. Screening for malignant neoplasm of cervix Z12.4 Pap imaged thin layer screen with HPV - recommended age 30 - 65 years (select HPV order below)     HPV High Risk Types DNA Cervical   4. HARLEY (generalized anxiety disorder) F41.1 FLUoxetine (PROZAC) 20 MG capsule refilled.    5. Hypothyroidism due to acquired atrophy of thyroid E03.4 TSH with free T4 reflex- recheck      levothyroxine (SYNTHROID/LEVOTHROID) 150 MCG tablet   6. CHEYANNE (obstructive sleep apnea)- mild (AHI 6) G47.33 Stable    7. Situational mixed anxiety and depressive disorder F43.23 Dealing with ongoing accusations from her , children are supportive and help to calm him down.    8. Closed displaced bimalleolar fracture of left lower leg with routine healing S82.842D ORTHO  REFERRAL- needs follow up closer to home. Doing well  "but has some sharp pains at times and would like this rechecked.    9. HSV (herpes simplex virus) infection B00.9 acyclovir (ZOVIRAX) 5 % external ointment as needed for cold sore outbreaks.        COUNSELING:   Reviewed preventive health counseling, as reflected in patient instructions    Estimated body mass index is 30.66 kg/m  as calculated from the following:    Height as of this encounter: 1.632 m (5' 4.25\").    Weight as of 7/12/19: 81.6 kg (180 lb).    Weight management plan: Discussed healthy diet and exercise guidelines     reports that she has never smoked. She has never used smokeless tobacco.      Counseling Resources:  ATP IV Guidelines  Pooled Cohorts Equation Calculator  Breast Cancer Risk Calculator  FRAX Risk Assessment  ICSI Preventive Guidelines  Dietary Guidelines for Americans, 2010  USDA's MyPlate  ASA Prophylaxis  Lung CA Screening    Makeda Bryan MD  [facility]  "

## 2019-12-19 NOTE — RESULT ENCOUNTER NOTE
Dear Aishwarya    Your test results are attached. I am happy to let you know that they are stable.    The thyroid test is normal. Pap smear results will be sent later.     Please contact me by [email or web address] if you have any questions about your labs or management. You may also call my office number [phone] for any questions.     Makeda Bryan MD

## 2019-12-20 LAB
COPATH REPORT: NORMAL
PAP: NORMAL

## 2019-12-24 LAB
FINAL DIAGNOSIS: NORMAL
HPV HR 12 DNA CVX QL NAA+PROBE: NEGATIVE
HPV16 DNA SPEC QL NAA+PROBE: NEGATIVE
HPV18 DNA SPEC QL NAA+PROBE: NEGATIVE
SPECIMEN DESCRIPTION: NORMAL
SPECIMEN SOURCE CVX/VAG CYTO: NORMAL

## 2020-01-30 DIAGNOSIS — K21.00 GASTROESOPHAGEAL REFLUX DISEASE WITH ESOPHAGITIS: ICD-10-CM

## 2020-01-30 NOTE — TELEPHONE ENCOUNTER
"Requested Prescriptions   Pending Prescriptions Disp Refills     omeprazole (PRILOSEC) 20 MG DR capsule [Pharmacy Med Name: OMEPRAZOLE DR 20 MG CAPSULE]  Last Written Prescription Date:  07/16/19  Last Fill Quantity: 30,  # refills: 3   Last Office Visit with JULIO, MILEY or [facility] prescribing provider:  12/16/19-Randi   Future Office Visit:    30 capsule 3     Sig: TAKE 1 CAPSULE BY MOUTH EVERY DAY       PPI Protocol Failed - 1/30/2020 11:50 AM        Failed - Medication is active on med list        Passed - Not on Clopidogrel (unless Pantoprazole ordered)        Passed - No diagnosis of osteoporosis on record        Passed - Recent (12 mo) or future (30 days) visit within the authorizing provider's specialty     Patient has had an office visit with the authorizing provider or a provider within the authorizing providers department within the previous 12 mos or has a future within next 30 days. See \"Patient Info\" tab in inbasket, or \"Choose Columns\" in Meds & Orders section of the refill encounter.              Passed - Patient is age 18 or older        Passed - No active pregnacy on record        Passed - No positive pregnancy test in past 12 months          "

## 2020-02-04 NOTE — TELEPHONE ENCOUNTER
Routing refill request to provider for review/approval because:  Drug not active on patient's medication list.    Eugenia Wilson RN, [facility] Triage

## 2020-03-04 ENCOUNTER — MYC REFILL (OUTPATIENT)
Dept: FAMILY MEDICINE | Facility: CLINIC | Age: 55
End: 2020-03-04

## 2020-03-04 DIAGNOSIS — B00.9 HSV (HERPES SIMPLEX VIRUS) INFECTION: ICD-10-CM

## 2020-03-04 NOTE — TELEPHONE ENCOUNTER
Requested Prescriptions   Pending Prescriptions Disp Refills     acyclovir (ZOVIRAX) 5 % external ointment 15 g 1     Sig: Apply topically 6 times daily       There is no refill protocol information for this order        acyclovir (ZOVIRAX) 5 % external ointment  Last Written Prescription Date:  12/16/19  Last Fill Quantity: 15g,  # refills: 1   Last office visit: 12/16/2019 with prescribing provider:  ALEC Bryan   Future Office Visit:

## 2020-03-06 RX ORDER — ACYCLOVIR 50 MG/G
OINTMENT TOPICAL
Qty: 15 G | Refills: 1 | Status: SHIPPED | OUTPATIENT
Start: 2020-03-06 | End: 2024-02-13

## 2020-03-06 NOTE — TELEPHONE ENCOUNTER
Routing refill request to provider for review/approval because:  Drug not on the [facility] refill protocol.    Eugenia Wilson RN, [facility] Triage

## 2020-04-26 ENCOUNTER — MYC MEDICAL ADVICE (OUTPATIENT)
Dept: FAMILY MEDICINE | Facility: CLINIC | Age: 55
End: 2020-04-26

## 2020-04-27 ENCOUNTER — TELEPHONE (OUTPATIENT)
Dept: FAMILY MEDICINE | Facility: CLINIC | Age: 55
End: 2020-04-27

## 2020-04-27 NOTE — TELEPHONE ENCOUNTER
"Patient is not sure who she needs to see or where she needs to go because Dr. Bryan thought she might need a steroid injection in her right hip. Since November, her hip pain has gotten progressively worse. Patient is having a really hard time sleeping at night. Hip pain hurts all the time and worse when she sits down. She is a  provider. She does have some old oxycodone at home and she has been tempted to take one just to help her sleep at night but it is not prescribed for her to do that and she would not want it to impact her  / license so she has not taken one. Unless provider could \"prescribe it for her to take prn at bedtime? She could then take some of this medication she has already in her home.)     She needs a plan for the pain. Who to see or what to take at this time.     Does she need a referral or injection? Or when can I get an appointment with someone for injection?     Routing to provider to review and advise.     Kaye Rios RN  [facility]        "

## 2020-04-27 NOTE — TELEPHONE ENCOUNTER
Patient is concerned about her rt hip pain and is requesting to have a referral or something to help relieve her pain. Please call [phone]

## 2020-04-27 NOTE — TELEPHONE ENCOUNTER
Patient will require a clinic encounter (face-to-face encounter) to further evaluate her condition.    Please schedule a face-to-face clinic encounter with Dr. Rivera this week for further evaluation and treatment (since Dr. Rivera can perform trochanteric hip injection, if necessary).

## 2020-04-28 NOTE — TELEPHONE ENCOUNTER
This writer attempted to contact patient  on 04/28/20      Reason for call patient needs to see Dr. Rivera for a face to face visit this week at the [facility], patient may call our appointment line and schedule and left message.      If patient  calls back:   Schedule Office Visit appointment  with Dr. Rivera at the [facility], document that pt called and close encounter         Charly Coyne

## 2020-04-29 NOTE — TELEPHONE ENCOUNTER
This writer attempted to contact patient  on 04/29/20        Reason for call patient needs to see Dr. Rivera for a face to face visit this week at the [facility], patient may call our appointment line and schedule and left message.        If patient  calls back:              Schedule Office Visit appointment  with Dr. Rivera at the [facility], document that pt called and close encounter            Charly Coyne

## 2020-05-01 ENCOUNTER — OFFICE VISIT (OUTPATIENT)
Dept: FAMILY MEDICINE | Facility: CLINIC | Age: 55
End: 2020-05-01
Payer: COMMERCIAL

## 2020-05-01 VITALS
TEMPERATURE: 98.1 F | HEIGHT: 64 IN | OXYGEN SATURATION: 97 % | HEART RATE: 75 BPM | DIASTOLIC BLOOD PRESSURE: 83 MMHG | SYSTOLIC BLOOD PRESSURE: 170 MMHG | BODY MASS INDEX: 30.66 KG/M2 | RESPIRATION RATE: 18 BRPM

## 2020-05-01 DIAGNOSIS — R03.0 ELEVATED BP WITHOUT DIAGNOSIS OF HYPERTENSION: ICD-10-CM

## 2020-05-01 DIAGNOSIS — M79.604 PAIN OF RIGHT LOWER EXTREMITY: Primary | ICD-10-CM

## 2020-05-01 PROCEDURE — 20550 NJX 1 TENDON SHEATH/LIGAMENT: CPT | Performed by: FAMILY MEDICINE

## 2020-05-01 PROCEDURE — 99214 OFFICE O/P EST MOD 30 MIN: CPT | Mod: 25 | Performed by: FAMILY MEDICINE

## 2020-05-01 RX ORDER — TRIAMCINOLONE ACETONIDE 40 MG/ML
40 INJECTION, SUSPENSION INTRA-ARTICULAR; INTRAMUSCULAR ONCE
Qty: 1 ML | Refills: 0
Start: 2020-05-01 | End: 2020-07-31

## 2020-05-01 ASSESSMENT — ANXIETY QUESTIONNAIRES
GAD7 TOTAL SCORE: 0
5. BEING SO RESTLESS THAT IT IS HARD TO SIT STILL: NOT AT ALL
IF YOU CHECKED OFF ANY PROBLEMS ON THIS QUESTIONNAIRE, HOW DIFFICULT HAVE THESE PROBLEMS MADE IT FOR YOU TO DO YOUR WORK, TAKE CARE OF THINGS AT HOME, OR GET ALONG WITH OTHER PEOPLE: NOT DIFFICULT AT ALL
3. WORRYING TOO MUCH ABOUT DIFFERENT THINGS: NOT AT ALL
1. FEELING NERVOUS, ANXIOUS, OR ON EDGE: NOT AT ALL
7. FEELING AFRAID AS IF SOMETHING AWFUL MIGHT HAPPEN: NOT AT ALL
6. BECOMING EASILY ANNOYED OR IRRITABLE: NOT AT ALL
2. NOT BEING ABLE TO STOP OR CONTROL WORRYING: NOT AT ALL

## 2020-05-01 ASSESSMENT — PATIENT HEALTH QUESTIONNAIRE - PHQ9
SUM OF ALL RESPONSES TO PHQ QUESTIONS 1-9: 0
5. POOR APPETITE OR OVEREATING: NOT AT ALL

## 2020-05-01 ASSESSMENT — PAIN SCALES - GENERAL: PAINLEVEL: MODERATE PAIN (5)

## 2020-05-01 NOTE — PROGRESS NOTES
"Subjective     Aishwarya Savage is a 55 year old female who presents to clinic today for the following health issues:    HPI   Joint Pain    Onset: October 2019    Description:   Location: right hip  Character: Sharp and Stabbing    Intensity: moderate, severe    Progression of Symptoms: worse    Accompanying Signs & Symptoms:  Other symptoms: radiation of pain to to RT calf    History:   Previous similar pain: no       Precipitating factors:   Trauma or overuse: no had surgery on lt ankle and it seemed to come about after that    Alleviating factors:  Improved by: nothing    Therapies Tried and outcome: ibuprofen, tylenol but that does not help.     SUBJECTIVE:  Here today to discuss some right leg pain as above.  Has been present for a number of months without any specific onset.  But she assumes it is related to last year's ankle fracture and having to walk abnormally for some time.  Bothers her at a variety of times, especially at night.  She feels an aching that seems to radiate from her buttock down the lateral aspect of her right leg and occasionally onto her anterior shin on the right.  It does not feel numb, more of a deep ache.  Tylenol and Advil can help but is just not getting any better.  Denies any back pain specifically.  No other associated symptoms.    Review of systems otherwise negative.  Past medical, family, and social history reviewed and updated in chart.    OBJECTIVE:  BP (!) 170/83 (BP Location: Right arm, Patient Position: Sitting, Cuff Size: Adult Regular)   Pulse 75   Temp 98.1  F (36.7  C) (Oral)   Resp 18   Ht 1.632 m (5' 4.25\")   LMP 02/24/2014 (LMP Unknown)   SpO2 97%   BMI 30.66 kg/m    Alert, pleasant, upbeat, and in no apparent discomfort.  S1 and S2 normal, no murmurs, clicks, gallops or rubs. Regular rate and rhythm. Chest is clear; no wheezes or rales. No edema or JVD.  RLE -normal range of motion at knees and hips bilaterally.  She is not specifically tender over the " greater trochanter but it seems deep and inferior to the piriformis region.  Straight leg raising negative bilaterally.  Normal muscle tone.  Normal deep tendon reflexes.  Past labs reviewed with the patient.     Discussed options with the patient.  This does not seem like classic piriformis syndrome and it may be that the impingement is a little bit lower.  To some degree this may just take time, stretching, walking etc.  But she would like to try cortisone injection.    The risks and benefits, as well as expected effect, of injection was discussed with patient and he agrees to proceed.  After identifying the area of tenderness the area was prepped clean with alcohol.  The skin was anesthetized with 1% lidocaine.  The tendon sheath was injected with 5 cc of a 4:1 mixture of lidocaine 1% and kenalog 40.  Pain experienced initially, followed by pain relief.  Bandaid. No complications.    ASSESSMENT / PLAN:  (W84.300) Pain of right lower extremity  (primary encounter diagnosis)  Comment: Possibly a version of piriformis syndrome.  Cortisone injection performed and aftercare discussed.  Okay to continue Tylenol and ibuprofen working to some of her leftover hydrocodone from her previous surgery  Plan: INJECTION SINGLE TENDON SHEATH/LIGAMENT,         triamcinolone (KENALOG) 40 MG/ML injection            Follow up 1 to 2 weeks with progress  SSam Rivera MD    (Chart documentation completed in part with Dragon voice-recognition software.  Even though reviewed some grammatical, spelling, and word errors may remain.)

## 2020-05-02 ASSESSMENT — ANXIETY QUESTIONNAIRES: GAD7 TOTAL SCORE: 0

## 2020-06-03 DIAGNOSIS — K21.00 GASTROESOPHAGEAL REFLUX DISEASE WITH ESOPHAGITIS: ICD-10-CM

## 2020-06-05 NOTE — TELEPHONE ENCOUNTER
Prescription approved per [facility] Refill Protocol.      Jose Gutierrez RN, BSN, PHN

## 2020-07-07 DIAGNOSIS — E03.4 HYPOTHYROIDISM DUE TO ACQUIRED ATROPHY OF THYROID: ICD-10-CM

## 2020-07-09 RX ORDER — LEVOTHYROXINE SODIUM 150 UG/1
TABLET ORAL
Qty: 30 TABLET | Refills: 4 | Status: SHIPPED | OUTPATIENT
Start: 2020-07-09 | End: 2020-12-13

## 2020-07-09 NOTE — TELEPHONE ENCOUNTER
Prescription approved per [facility] Refill Protocol.    Eugenia Wilson RN, [facility] Triage

## 2020-07-31 ENCOUNTER — OFFICE VISIT (OUTPATIENT)
Dept: OPTOMETRY | Facility: CLINIC | Age: 55
End: 2020-07-31
Payer: COMMERCIAL

## 2020-07-31 DIAGNOSIS — Z01.00 EXAMINATION OF EYES AND VISION: Primary | ICD-10-CM

## 2020-07-31 DIAGNOSIS — H52.4 PRESBYOPIA: ICD-10-CM

## 2020-07-31 DIAGNOSIS — H04.123 DRY EYE SYNDROME OF BOTH EYES: ICD-10-CM

## 2020-07-31 DIAGNOSIS — H52.223 REGULAR ASTIGMATISM OF BOTH EYES: ICD-10-CM

## 2020-07-31 DIAGNOSIS — H02.889 MEIBOMIAN GLAND DYSFUNCTION: ICD-10-CM

## 2020-07-31 DIAGNOSIS — H52.03 HYPEROPIA, BILATERAL: ICD-10-CM

## 2020-07-31 DIAGNOSIS — H40.003 GLAUCOMA SUSPECT OF BOTH EYES: ICD-10-CM

## 2020-07-31 PROCEDURE — 92014 COMPRE OPH EXAM EST PT 1/>: CPT | Performed by: OPTOMETRIST

## 2020-07-31 PROCEDURE — 92015 DETERMINE REFRACTIVE STATE: CPT | Performed by: OPTOMETRIST

## 2020-07-31 RX ORDER — POLYETHYLENE GLYCOL 400 AND PROPYLENE GLYCOL 4; 3 MG/ML; MG/ML
1 SOLUTION/ DROPS OPHTHALMIC 4 TIMES DAILY
Qty: 6 ML | Refills: 12 | Status: SHIPPED | OUTPATIENT
Start: 2020-07-31 | End: 2022-08-05

## 2020-07-31 RX ORDER — POLYETHYLENE GLYCOL AND PROPYLENE GLYCOL 4; 3 MG/ML; MG/ML
1 SOLUTION/ DROPS OPHTHALMIC
Status: CANCELLED | OUTPATIENT
Start: 2020-07-31

## 2020-07-31 ASSESSMENT — SLIT LAMP EXAM - LIDS
COMMENTS: MEIBOMIAN GLAND DYSFUNCTION
COMMENTS: MEIBOMIAN GLAND DYSFUNCTION

## 2020-07-31 ASSESSMENT — VISUAL ACUITY
CORRECTION_TYPE: GLASSES
METHOD: SNELLEN - LINEAR
OD_SC: 20/25
OS_SC: 20/30
OS_CC: 20/40
OD_CC: 20/40

## 2020-07-31 ASSESSMENT — EXTERNAL EXAM - RIGHT EYE: OD_EXAM: NORMAL

## 2020-07-31 ASSESSMENT — REFRACTION_WEARINGRX
OD_SPHERE: +2.00
OS_SPHERE: +2.00
OD_SPHERE: +2.00
OS_SPHERE: PLANO
OS_SPHERE: +2.00
OD_CYLINDER: +0.50
OS_CYLINDER: +0.50
OS_AXIS: 172
OS_CYLINDER: +0.50
OD_AXIS: 010
OD_CYLINDER: +0.50
OS_CYLINDER: +0.50
OD_SPHERE: +2.00
OD_AXIS: 010
OD_CYLINDER: SPHERE
OD_CYLINDER: +0.50
OD_SPHERE: PLANO
OD_AXIS: 010
OS_CYLINDER: SPHERE
OS_AXIS: 170
OS_SPHERE: +2.00
OS_AXIS: 172

## 2020-07-31 ASSESSMENT — TONOMETRY
OD_IOP_MMHG: 14
IOP_METHOD: TONOPEN
OS_IOP_MMHG: 12

## 2020-07-31 ASSESSMENT — CUP TO DISC RATIO
OS_RATIO: 0.5
OD_RATIO: 0.65

## 2020-07-31 ASSESSMENT — CONF VISUAL FIELD
OD_NORMAL: 1
OS_NORMAL: 1

## 2020-07-31 ASSESSMENT — REFRACTION_MANIFEST
OS_ADD: +2.25
OD_AXIS: 010
OS_CYLINDER: +0.50
OD_ADD: +2.25
OD_SPHERE: +0.50
OS_SPHERE: +0.25
OS_AXIS: 170
OD_CYLINDER: +0.75

## 2020-07-31 ASSESSMENT — EXTERNAL EXAM - LEFT EYE: OS_EXAM: NORMAL

## 2020-07-31 NOTE — LETTER
7/31/2020         RE: Aishwarya Savage  [address]  [place] [address]        Dear Colleague,    Thank you for referring your patient, Aishwarya Savage, to the [facility]. Please see a copy of my visit note below.    Chief Complaint   Patient presents with     Annual Eye Exam      Accompanied by self  Last Eye Exam: 4-4-2018  Dilated Previously: Yes    What are you currently using to see?  otc readers       Distance Vision Acuity: Noticed gradual change in both eyes    Near Vision Acuity: Satisfied with vision while reading  With otc readers    Eye Comfort: dry and feels like something in od eye  Do you use eye drops? : No  Occupation or Hobbies: childcare provider    Last visit was at [facility] for glaucoma testing- was told she had the option of using drops as they help eyelashes grow longer.  It was up to her.    Diaan Devine Optometric Assistant, A.B.O.C.          Medical, surgical and family histories reviewed and updated 7/31/2020.       OBJECTIVE: See Ophthalmology exam    ASSESSMENT:    ICD-10-CM    1. Examination of eyes and vision  Z01.00 EYE EXAM (SIMPLE-NONBILLABLE)   2. Hyperopia, bilateral  H52.03 REFRACTION   3. Regular astigmatism of both eyes  H52.223 REFRACTION   4. Presbyopia  H52.4 REFRACTION   5. Glaucoma suspect of both eyes  H40.003 EYE EXAM (SIMPLE-NONBILLABLE)   6. Meibomian gland dysfunction  H02.889 EYE EXAM (SIMPLE-NONBILLABLE)   7. Dry eye syndrome of both eyes  H04.123 EYE EXAM (SIMPLE-NONBILLABLE)     polyethylene glycol-propylene glycol (SYSTANE ULTRA) 0.4-0.3 % SOLN ophthalmic solution      PLAN:     Patient Instructions   Eyeglass prescription given.     Heat to the eyes daily for 10-15 minutes nightly with warm washcloth or reusable gel masks from the pharmacy or  [employer] heat masks can be purchased at [employer].    Systane Ultra 1 drop both eyes 2-4 x day.    Ocusoft Hypochlor- spray solution onto cotton pad.  Close eyes and gently apply to  eyelids and eyelashes using side to side motion.  Use morning and evening.    Monitor glaucoma status with yearly eye exams.  Glaucoma testing at [facility] next year.    Return in 1 year for a complete eye exam or sooner if needed.    Eric Chu, OD                      Again, thank you for allowing me to participate in the care of your patient.        Sincerely,        Eric Chu, OD

## 2020-07-31 NOTE — PROGRESS NOTES
Chief Complaint   Patient presents with     Annual Eye Exam      Accompanied by self  Last Eye Exam: 4-4-2018  Dilated Previously: Yes    What are you currently using to see?  otc readers       Distance Vision Acuity: Noticed gradual change in both eyes    Near Vision Acuity: Satisfied with vision while reading  With otc readers    Eye Comfort: dry and feels like something in od eye  Do you use eye drops? : No  Occupation or Hobbies: childcare provider    Last visit was at [facility] for glaucoma testing- was told she had the option of using drops as they help eyelashes grow longer.  It was up to her.    Diana Devine Optometric Assistant, A.B.O.C.          Medical, surgical and family histories reviewed and updated 7/31/2020.       OBJECTIVE: See Ophthalmology exam    ASSESSMENT:    ICD-10-CM    1. Examination of eyes and vision  Z01.00 EYE EXAM (SIMPLE-NONBILLABLE)   2. Hyperopia, bilateral  H52.03 REFRACTION   3. Regular astigmatism of both eyes  H52.223 REFRACTION   4. Presbyopia  H52.4 REFRACTION   5. Glaucoma suspect of both eyes  H40.003 EYE EXAM (SIMPLE-NONBILLABLE)   6. Meibomian gland dysfunction  H02.889 EYE EXAM (SIMPLE-NONBILLABLE)   7. Dry eye syndrome of both eyes  H04.123 EYE EXAM (SIMPLE-NONBILLABLE)     polyethylene glycol-propylene glycol (SYSTANE ULTRA) 0.4-0.3 % SOLN ophthalmic solution      PLAN:     Patient Instructions   Eyeglass prescription given.     Heat to the eyes daily for 10-15 minutes nightly with warm washcloth or reusable gel masks from the pharmacy or  [employer] heat masks can be purchased at [employer].    Systane Ultra 1 drop both eyes 2-4 x day.    Ocusoft Hypochlor- spray solution onto cotton pad.  Close eyes and gently apply to eyelids and eyelashes using side to side motion.  Use morning and evening.    Monitor glaucoma status with yearly eye exams.  Glaucoma testing at [facility] next year.    Return in 1 year for a complete eye exam or sooner if needed.    Eric Chu, OD                 Addended- 9/1/2020 - patient would like bifocal set further out- it is too strong.  Dropped add to a +1.75.  Eric Chu, OD

## 2020-07-31 NOTE — PATIENT INSTRUCTIONS
Eyeglass prescription given.     Heat to the eyes daily for 10-15 minutes nightly with warm washcloth or reusable gel masks from the pharmacy or  SynapDx heat masks can be purchased at [employer].    Systane Ultra 1 drop both eyes 2-4 x day.    Ocusoft Hypochlor- spray solution onto cotton pad.  Close eyes and gently apply to eyelids and eyelashes using side to side motion.  Use morning and evening.    Monitor glaucoma status with yearly eye exams.  Glaucoma testing at [place] next year.    Return in 1 year for a complete eye exam or sooner if needed.    Eric Chu, MARCIA    The affects of the dilating drops last for 4- 6 hours.  You will be more sensitive to light and vision will be blurry up close.  Mydriatic sunglasses were given if needed.    There is a combination of three treatments which can greatly improve symptoms of dry eyes.    1.  Artificial tears  2. Heat (eyes closed)  3. Eyelid and eyelash cleansing (eyes closed)     Use one drop of artificial tears both eyes 4 x daily.  Once in the morning, lunch, dinner and bedtime. Continue to use the drops regardless if your eyes are comfortable or not.  Artificial tears work best as a preventative and not as well after your eyes are starting to bother you.  It may take 4- 6 weeks of using the drops before you notice improvement.  If after that time you are still having problems schedule an appointment for an evaluation and discussion of different treatments such as Restasis or Xiidra.  Dry eyes are a chronic condition and you may have more symptoms at certain times of the year.    Excess tearing can be due to the right tears not working properly or a blockage in the tear drainage system.  You can try using artificial tears 1 drop right eye 4 x day.  If the excess tearing is bothersome after 4-6 weeks of treatment then we can send you for further testing.  This would entail a referral to our oculoplastic specialist Dr. Soniya De Leon at the [facility]  [phone].    Recommended brands are:    Systane Complete  Systane Ultra  Systane Balance  Refresh Advanced Optive  Refresh Relieva  Blink    Recommended brands for contact lens wearers are:    Systane contacts  Refresh contacts  Blink contacts    If you are using drops more than 4 x day or have sensitivities to preservatives I recommend non preserved artificial tears.  These come in 1 use vials.  They can be used every 1-2 hours.  Do not reuse the vials.    Recommended brands are:    Refresh Optive Daniel-3  Systane- preservative free  Refresh-  preservative free  Blink- preservative free    Gels or ointment can be used at night.    Recommended brands are:    Systane Gel  Refresh Gel  Blink Gel  Genteal Gel    Systane night time (ointment)  Refresh Celluvisc  Refresh PM (ointment)      Visine, Clear Eyes or Murine (drops that get the red out) can irritate the eyes and cause a rebound effect where the eyes become more red and you end up using more drops.  Avoid drops containing tetrahydrozoline, naphazoline, phenylephrine, oxymetazoline.      OTC Lumify is a newer product that gives immediate redness relief without the rebound effect.  Use as needed to take the redness out.    Artificial tears may be used with other drops (such as allergy, glaucoma, antibiotics) around the same time.  Be sure to wait 5 minutes in between drops.    Heat to the eyelids can also improve your symptoms of dry eyes.  Marguerite heat masks can be purchased at [employer] to be used nightly for 10-15 minutes.  Other options are gel masks that can be put in the microwave and purchased at most pharmacies.      Tea Tree Oil eyelid cleansers recommended are Ocusoft Oust foam cleanser to cleanse eyelids/lashes at night and in the am. Other options are Blephadex or Cliradex eyelid wipes.  KEEP EYES CLOSED when using these products.  These can be purchased on amazon.com   A good product for make up remover with tea tree oil is WeLoveEyes.  This can  be found at www.[employer] or [employer].    Other good eyelid cleansers have hypochlorous acid which removes excess bacteria and is safe around the eyes. Products are Avenova, Ocusoft Hypochlor or Heyedrate. Spray solution onto cotton pad, close eyes and gently apply to eyelids and eyelashes using side to side motion.  You can also KEEP EYES CLOSED spray and rub into eyelashes.  You do not need to rinse it off. Use morning and evening. These products can be found on [employer].  You can check with your local pharmacy and see if they can order if for you if they don't have it.    Other brands of eyelid cleansing wipes are:    Ocusoft wipes  Systane wipes      Optometry Providers       Clinic Locations                                 Telephone Number   Dr. Gloria Griggs [phone]     [facility] Hours:                [facility] Hours:       [facility] Hours:   [address]   [address]     [address] [facility] [address] [facility]  [place] [address]   [place] [address]    [place] [address]  Phone: [phone]                    Phone: [phone]     Phone: [phone]                      Monday 8:00-7:00                          Monday 8:00-7:00                          Monday 8:00-7:00              Tuesday 8:00-6:00                          Tuesday 8:00-7:00                          Tuesday 8:00-7:00              Wednesday 8:00-6:00                  Wednesday 8:00-7:00                   Wednesday 8:00-7:00      Thursday 8:00-6:00                        Thursday 8:00-7:00                         Thursday 8:00-7:00            Friday 8:00-5:00                              Friday 8:00-5:00                              Friday 8:00-5:00    [facility] Hours:   [address] [facility] [place] [id number] [phone]    Monday 8:00-7:00  Tuesday 8:00-7:00  Wednesday 8:00-7:00  Thursday  8:00-7:00  Friday 8:00-5:00  Please log on to [email or web address] to order your contact lenses.  The link is found on the [facility] page.  As always, Thank you for trusting us with your health care needs!

## 2020-08-05 ENCOUNTER — MYC MEDICAL ADVICE (OUTPATIENT)
Dept: FAMILY MEDICINE | Facility: CLINIC | Age: 55
End: 2020-08-05

## 2020-08-05 ENCOUNTER — TELEPHONE (OUTPATIENT)
Dept: FAMILY MEDICINE | Facility: CLINIC | Age: 55
End: 2020-08-05

## 2020-08-05 NOTE — TELEPHONE ENCOUNTER
Reason for call:  Patient reporting a symptom    Symptom or request: Hip pain    Duration (how long have symptoms been present): October 2019    Have you been treated for this before? Yes    Additional comments: Needs a referral; Cortizone shot in the hip didn't help    Phone Number patient can be reached at:  Home number on file [phone] (home)    Best Time:  Anytime.    Can we leave a detailed message on this number:  YES    Call taken on 8/5/2020 at 3:17 PM by Deidra Quigley

## 2020-08-05 NOTE — TELEPHONE ENCOUNTER
Phone listed below is disconnected.  Called mobile number listed in demographics.    This writer attempted to contact patient on 08/05/20      Reason for call triage and left message.      If patient calls back:   Registered Nurse called. Follow Triage Call workflow        Justyna Alvarez RN

## 2020-08-06 NOTE — TELEPHONE ENCOUNTER
"S-(situation): spoke with patient on the phone. She calls in with right hip pain.     B-(background): seen for this in clinic on 5/1/20 and received cortisone injection. She states this did not help her.    A-(assessment): last summer following broken ankle, her right hip began hurting. She states the More active she is the more the right hip hurts. When she stops moving it really starts to hurt and she has difficulty sleeping. occasionally sheTakes 1/2 Norco at night when before she goes to be and this is not effective. The pain radiates Down into back into right leg. Toes twinge. Side of right hip and front of right thigh the pain is felt also.  Pain 9/10 at its worst, in am pain is better and as the day goes on with activity the pain is worse. sitting makes the pain worse. She reports having full range of motion in lower right extremity.    Negative for following symptoms:    R-(recommendations): recommended virtual visit today. She states she is unable to schedule. She will call her sisters \"hip Doctor\" to try to schedule with him and will call back if she needs referral.    Itzel Gibbs RN            "

## 2020-08-12 ENCOUNTER — APPOINTMENT (OUTPATIENT)
Dept: OPTOMETRY | Facility: CLINIC | Age: 55
End: 2020-08-12
Payer: COMMERCIAL

## 2020-08-12 PROCEDURE — 92341 FIT SPECTACLES BIFOCAL: CPT | Performed by: OPTOMETRIST

## 2020-08-20 ENCOUNTER — OFFICE VISIT (OUTPATIENT)
Dept: ORTHOPEDICS | Facility: CLINIC | Age: 55
End: 2020-08-20
Payer: COMMERCIAL

## 2020-08-20 ENCOUNTER — ANCILLARY PROCEDURE (OUTPATIENT)
Dept: GENERAL RADIOLOGY | Facility: CLINIC | Age: 55
End: 2020-08-20
Attending: FAMILY MEDICINE
Payer: COMMERCIAL

## 2020-08-20 VITALS — BODY MASS INDEX: 30.66 KG/M2 | HEIGHT: 64 IN

## 2020-08-20 DIAGNOSIS — M79.604 LEG PAIN, RIGHT: Primary | ICD-10-CM

## 2020-08-20 DIAGNOSIS — M79.604 LEG PAIN, RIGHT: ICD-10-CM

## 2020-08-20 DIAGNOSIS — M54.16 LUMBAR RADICULOPATHY: ICD-10-CM

## 2020-08-20 PROCEDURE — 72100 X-RAY EXAM L-S SPINE 2/3 VWS: CPT | Performed by: RADIOLOGY

## 2020-08-20 PROCEDURE — 73521 X-RAY EXAM HIPS BI 2 VIEWS: CPT | Performed by: RADIOLOGY

## 2020-08-20 PROCEDURE — 99214 OFFICE O/P EST MOD 30 MIN: CPT | Performed by: FAMILY MEDICINE

## 2020-08-20 RX ORDER — PREDNISONE 20 MG/1
40 TABLET ORAL DAILY
Qty: 10 TABLET | Refills: 0 | Status: SHIPPED | OUTPATIENT
Start: 2020-08-20 | End: 2020-08-25

## 2020-08-20 NOTE — LETTER
"    8/20/2020         RE: Aishwarya Savage  [address] [place] [address]        Dear Colleague,    Thank you for referring your patient, Aishwarya Savage, to the [facility]. Please see a copy of my visit note below.          [facility]  8/20/2020    Aishwarya Savage's chief complaint for this visit includes:  Chief Complaint   Patient presents with     Consult     right sided buttock, thight and calf pain      PCP: Makeda Bryan    Referring Provider:  No referring provider defined for this encounter.    Ht 1.632 m (5' 4.25\")   LMP 02/24/2014 (LMP Unknown)   BMI 30.66 kg/m    Data Unavailable       Reason for visit:     What part of your body is injured / painful?  right low back    What caused the injury /pain? No inciting event     How long ago did your injury occur or pain begin? several years ago    What are your most bothersome symptoms? Pain    How would you characterize your symptom?  aching    What makes your symptoms better? Rest    What makes your symptoms worse? Movement    Have you been previously seen for this problem? No    Medical History:    Any recent changes to your medical history? No    Any new medication prescribed since last visit? No    Have you had surgery on this body part before? No    Social History:    Occupation:      Review of Systems:    Do you have fever, chills, weight loss? No    Do you have any vision problems? No    Do you have any chest pain or edema? No    Do you have any shortness of breath or wheezing?  No    Do you have stomach problems? No    Do you have any urinary track issues? No    Do you have any numbness or focal weakness? No    Do you have diabetes? No    Do you have problems with bleeding or clotting? No    Do you have an rashes or other skin lesions? No       CHIEF COMPLAINT:  Consult (right sided buttock, thight and calf pain )       HISTORY OF PRESENT ILLNESS  Ms. Savage is a pleasant 55 year old " female who presents to clinic today with pain in her right thigh, buttock, and calf region.  Aishwarya spann started to have pain several years ago, no clear inciting event that she can recall.  Her pain is worse as the day goes on, definitely worse the more that she is on her feet.  She feels numbness and tingling at times, she denies any weakness in her leg.  She has tried oral analgesics and stretching with varying levels of success.      Additional history: as documented    MEDICAL HISTORY  Patient Active Problem List   Diagnosis     Hypothyroidism     HSV (herpes simplex virus) infection     Depression     Glaucoma suspect     HARLEY (generalized anxiety disorder)     CHEYANNE (obstructive sleep apnea)- mild (AHI 6)     Fibromyalgia     Hyperlipidemia LDL goal <160     Situational mixed anxiety and depressive disorder     Acute seasonal allergic rhinitis due to pollen     Current mild episode of major depressive disorder without prior episode (H)     Closed displaced bimalleolar fracture of left lower leg with routine healing       Current Outpatient Medications   Medication Sig Dispense Refill     predniSONE (DELTASONE) 20 MG tablet Take 2 tablets (40 mg) by mouth daily for 5 days 10 tablet 0     acyclovir (ZOVIRAX) 5 % external ointment Apply topically 6 times daily 15 g 1     albuterol (PROAIR HFA/PROVENTIL HFA/VENTOLIN HFA) 108 (90 Base) MCG/ACT inhaler Inhale 1-2 puffs into the lungs every 6 hours as needed for shortness of breath / dyspnea or wheezing 1 Inhaler 3     cetirizine (ZYRTEC) 10 MG tablet TAKE 1 TABLET BY MOUTH EVERY DAY 60 tablet 3     FLUoxetine (PROZAC) 20 MG capsule Take 1 capsule (20 mg) by mouth daily 90 capsule 3     IBUPROFEN 200 MG OR TABS 1 TABLET EVERY 4 TO 6 HOURS AS NEEDED       levothyroxine (SYNTHROID/LEVOTHROID) 150 MCG tablet TAKE 1 TABLET BY MOUTH EVERY DAY 30 tablet 4     Multiple Vitamins-Minerals (MULTIVITAMIN ADULT PO) Take 1 tablet by mouth daily       omeprazole (PRILOSEC) 20 MG   "capsule TAKE 1 CAPSULE BY MOUTH EVERY DAY 30 capsule 10     polyethylene glycol-propylene glycol (SYSTANE ULTRA) 0.4-0.3 % SOLN ophthalmic solution Place 1 drop into both eyes 4 times daily 6 mL 12       Allergies   Allergen Reactions     Clindamycin Swelling     face     Sulfa Drugs Hives       Family History   Problem Relation Age of Onset     Cancer Maternal Grandmother         lung     Cancer Mother         lung     Blood Disease Mother         nonhodgkins lymphoma     Thyroid Disease Mother         hypothyroid     Diabetes Mother         Type 2     Diabetes Father         2     Hypertension Father      C.A.D. Father         ? MI at 69     Cardiovascular Paternal Grandfather         AAA     Breast Cancer Other      Glaucoma Paternal Grandmother      Diabetes Sister         diabetic     Cerebrovascular Disease Maternal Aunt      Macular Degeneration No family hx of        Additional medical/Social/Surgical histories reviewed in [facility] and updated as appropriate.        PHYSICAL EXAM    Vitals:    08/20/20 1629   Height: 1.632 m (5' 4.25\")     General  - normal appearance, in no obvious distress  HEENT  - conjunctivae not injected, moist mucous membranes  CV  - normal peripheral perfusion  Pulm  - normal respiratory pattern, non-labored  Musculoskeletal - lumbar spine  - stance: normal gait without limp  - inspection: normal bone and joint alignment, no obvious scoliosis  - palpation: no paravertebral or bony tenderness  - ROM: flexion exacerbates pain, normal extension, sidebending, rotation  - strength: lower extremities 5/5 in all planes  - special tests:  (+) slump test on R  Neuro  - patellar and Achilles DTRs 2+ bilaterally, grossly normal coordination, normal muscle tone  Skin  - no ecchymosis, erythema, warmth, or induration, no obvious rash  Psych  - interactive, appropriate, normal mood and affect             ASSESSMENT & PLAN  Ms. Savage is a 55 year old female who presents to clinic today with low back " and right leg pain.    I ordered and reviewed radiographs of her pelvis and lumbar spine, these do show degenerative changes at the inferior lumbar levels.  Her exam and history does suggest that her pain is likely arising from a lumbar spine issue, primarily.  We discussed this in some detail.    Given her chronicity and the level of disability I am ordering an MRI of her lumbar spine.  She can get this done her earliest convenience.  I am also prescribing her prednisone for her acute pain.    We will follow-up after her MRI to discuss the results.    It was a pleasure seeing Aishwarya today.    Judah Santillan DO, [facility]  Primary Care Sports Medicine      This note was constructed using Dragon dictation software, please excuse any minor errors in spelling, grammar, or syntax.      Again, thank you for allowing me to participate in the care of your patient.        Sincerely,        Judah Santillan DO

## 2020-08-20 NOTE — PROGRESS NOTES
"      [facility]  8/20/2020    Aishwarya Savage's chief complaint for this visit includes:  Chief Complaint   Patient presents with     Consult     right sided buttock, thight and calf pain      PCP: Makeda Bryan    Referring Provider:  No referring provider defined for this encounter.    Ht 1.632 m (5' 4.25\")   LMP 02/24/2014 (LMP Unknown)   BMI 30.66 kg/m    Data Unavailable       Reason for visit:     What part of your body is injured / painful?  right low back    What caused the injury /pain? No inciting event     How long ago did your injury occur or pain begin? several years ago    What are your most bothersome symptoms? Pain    How would you characterize your symptom?  aching    What makes your symptoms better? Rest    What makes your symptoms worse? Movement    Have you been previously seen for this problem? No    Medical History:    Any recent changes to your medical history? No    Any new medication prescribed since last visit? No    Have you had surgery on this body part before? No    Social History:    Occupation:      Review of Systems:    Do you have fever, chills, weight loss? No    Do you have any vision problems? No    Do you have any chest pain or edema? No    Do you have any shortness of breath or wheezing?  No    Do you have stomach problems? No    Do you have any urinary track issues? No    Do you have any numbness or focal weakness? No    Do you have diabetes? No    Do you have problems with bleeding or clotting? No    Do you have an rashes or other skin lesions? No       CHIEF COMPLAINT:  Consult (right sided buttock, thight and calf pain )       HISTORY OF PRESENT ILLNESS  Ms. Savage is a pleasant 55 year old female who presents to clinic today with pain in her right thigh, buttock, and calf region.  Aishwarya spann started to have pain several years ago, no clear inciting event that she can recall.  Her pain is worse as the day goes on, definitely worse the more " that she is on her feet.  She feels numbness and tingling at times, she denies any weakness in her leg.  She has tried oral analgesics and stretching with varying levels of success.      Additional history: as documented    MEDICAL HISTORY  Patient Active Problem List   Diagnosis     Hypothyroidism     HSV (herpes simplex virus) infection     Depression     Glaucoma suspect     HARLEY (generalized anxiety disorder)     CHEYANNE (obstructive sleep apnea)- mild (AHI 6)     Fibromyalgia     Hyperlipidemia LDL goal <160     Situational mixed anxiety and depressive disorder     Acute seasonal allergic rhinitis due to pollen     Current mild episode of major depressive disorder without prior episode (H)     Closed displaced bimalleolar fracture of left lower leg with routine healing       Current Outpatient Medications   Medication Sig Dispense Refill     predniSONE (DELTASONE) 20 MG tablet Take 2 tablets (40 mg) by mouth daily for 5 days 10 tablet 0     acyclovir (ZOVIRAX) 5 % external ointment Apply topically 6 times daily 15 g 1     albuterol (PROAIR HFA/PROVENTIL HFA/VENTOLIN HFA) 108 (90 Base) MCG/ACT inhaler Inhale 1-2 puffs into the lungs every 6 hours as needed for shortness of breath / dyspnea or wheezing 1 Inhaler 3     cetirizine (ZYRTEC) 10 MG tablet TAKE 1 TABLET BY MOUTH EVERY DAY 60 tablet 3     FLUoxetine (PROZAC) 20 MG capsule Take 1 capsule (20 mg) by mouth daily 90 capsule 3     IBUPROFEN 200 MG OR TABS 1 TABLET EVERY 4 TO 6 HOURS AS NEEDED       levothyroxine (SYNTHROID/LEVOTHROID) 150 MCG tablet TAKE 1 TABLET BY MOUTH EVERY DAY 30 tablet 4     Multiple Vitamins-Minerals (MULTIVITAMIN ADULT PO) Take 1 tablet by mouth daily       omeprazole (PRILOSEC) 20 MG DR capsule TAKE 1 CAPSULE BY MOUTH EVERY DAY 30 capsule 10     polyethylene glycol-propylene glycol (SYSTANE ULTRA) 0.4-0.3 % SOLN ophthalmic solution Place 1 drop into both eyes 4 times daily 6 mL 12       Allergies   Allergen Reactions     Clindamycin  "Swelling     face     Sulfa Drugs Hives       Family History   Problem Relation Age of Onset     Cancer Maternal Grandmother         lung     Cancer Mother         lung     Blood Disease Mother         nonhodgkins lymphoma     Thyroid Disease Mother         hypothyroid     Diabetes Mother         Type 2     Diabetes Father         2     Hypertension Father      C.A.D. Father         ? MI at 69     Cardiovascular Paternal Grandfather         AAA     Breast Cancer Other      Glaucoma Paternal Grandmother      Diabetes Sister         diabetic     Cerebrovascular Disease Maternal Aunt      Macular Degeneration No family hx of        Additional medical/Social/Surgical histories reviewed in [facility] and updated as appropriate.        PHYSICAL EXAM    Vitals:    08/20/20 1629   Height: 1.632 m (5' 4.25\")     General  - normal appearance, in no obvious distress  HEENT  - conjunctivae not injected, moist mucous membranes  CV  - normal peripheral perfusion  Pulm  - normal respiratory pattern, non-labored  Musculoskeletal - lumbar spine  - stance: normal gait without limp  - inspection: normal bone and joint alignment, no obvious scoliosis  - palpation: no paravertebral or bony tenderness  - ROM: flexion exacerbates pain, normal extension, sidebending, rotation  - strength: lower extremities 5/5 in all planes  - special tests:  (+) slump test on R  Neuro  - patellar and Achilles DTRs 2+ bilaterally, grossly normal coordination, normal muscle tone  Skin  - no ecchymosis, erythema, warmth, or induration, no obvious rash  Psych  - interactive, appropriate, normal mood and affect             ASSESSMENT & PLAN  Ms. Savage is a 55 year old female who presents to clinic today with low back and right leg pain.    I ordered and reviewed radiographs of her pelvis and lumbar spine, these do show degenerative changes at the inferior lumbar levels.  Her exam and history does suggest that her pain is likely arising from a lumbar spine issue, " primarily.  We discussed this in some detail.    Given her chronicity and the level of disability I am ordering an MRI of her lumbar spine.  She can get this done her earliest convenience.  I am also prescribing her prednisone for her acute pain.    We will follow-up after her MRI to discuss the results.    It was a pleasure seeing Aishwarya today.    Judah Santillan DO, [facility]  Primary Care Sports Medicine      This note was constructed using Dragon dictation software, please excuse any minor errors in spelling, grammar, or syntax.

## 2020-09-01 ASSESSMENT — REFRACTION_WEARINGRX
OS_ADD: +2.00
OD_ADD: +2.00
SPECS_TYPE: OTC READERS
SPECS_TYPE: DIDNT BRING

## 2020-09-18 ENCOUNTER — ANCILLARY PROCEDURE (OUTPATIENT)
Dept: MRI IMAGING | Facility: CLINIC | Age: 55
End: 2020-09-18
Attending: FAMILY MEDICINE
Payer: COMMERCIAL

## 2020-09-18 DIAGNOSIS — M79.604 LEG PAIN, RIGHT: ICD-10-CM

## 2020-09-18 DIAGNOSIS — M54.16 LUMBAR RADICULOPATHY: ICD-10-CM

## 2020-09-18 PROCEDURE — 72148 MRI LUMBAR SPINE W/O DYE: CPT | Performed by: RADIOLOGY

## 2020-10-14 ENCOUNTER — APPOINTMENT (OUTPATIENT)
Dept: OPTOMETRY | Facility: CLINIC | Age: 55
End: 2020-10-14
Payer: COMMERCIAL

## 2020-10-14 PROCEDURE — 92341 FIT SPECTACLES BIFOCAL: CPT | Performed by: OPTOMETRIST

## 2020-12-12 ENCOUNTER — TELEPHONE (OUTPATIENT)
Dept: FAMILY MEDICINE | Facility: CLINIC | Age: 55
End: 2020-12-12

## 2020-12-12 DIAGNOSIS — E03.4 HYPOTHYROIDISM DUE TO ACQUIRED ATROPHY OF THYROID: ICD-10-CM

## 2020-12-13 RX ORDER — LEVOTHYROXINE SODIUM 150 UG/1
TABLET ORAL
Qty: 30 TABLET | Refills: 1 | Status: SHIPPED | OUTPATIENT
Start: 2020-12-13 | End: 2021-01-26

## 2020-12-13 NOTE — TELEPHONE ENCOUNTER
Please have patient schedule for a follow up visit, virtual or face to face. Future lab order placed and needs a lab appointment also. Prescription refilled. Makeda Bryan MD

## 2020-12-18 ENCOUNTER — MYC MEDICAL ADVICE (OUTPATIENT)
Dept: FAMILY MEDICINE | Facility: CLINIC | Age: 55
End: 2020-12-18

## 2020-12-18 ENCOUNTER — TELEPHONE (OUTPATIENT)
Dept: FAMILY MEDICINE | Facility: CLINIC | Age: 55
End: 2020-12-18

## 2020-12-18 DIAGNOSIS — Z20.822 EXPOSURE TO COVID-19 VIRUS: Primary | ICD-10-CM

## 2020-12-18 NOTE — TELEPHONE ENCOUNTER
I put an order in for COVID testing. Please direct patient if she needs to call to schedule this.  Makeda Bryan MD

## 2020-12-18 NOTE — TELEPHONE ENCOUNTER
Me         12/18/20 4:02 PM  Note     Pt updated.  I gave her # to call to schedule covid test     NANCY Fernandes.

## 2020-12-18 NOTE — TELEPHONE ENCOUNTER
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 She would like to get a test to make sure that she doesn't have it. Thank you    Phone Number Patient can be reached at: Home number on file [phone] (home)    Best Time: anytime    Can we leave a detailed message on this number? YES    Call taken on 12/18/2020 at 3:00 PM by Loree Ames

## 2020-12-18 NOTE — TELEPHONE ENCOUNTER
Please advise if pt needs an appt.  Pt states this was discussed before and there was not a need for a visit.

## 2020-12-19 DIAGNOSIS — Z20.822 EXPOSURE TO COVID-19 VIRUS: ICD-10-CM

## 2020-12-19 PROCEDURE — U0003 INFECTIOUS AGENT DETECTION BY NUCLEIC ACID (DNA OR RNA); SEVERE ACUTE RESPIRATORY SYNDROME CORONAVIRUS 2 (SARS-COV-2) (CORONAVIRUS DISEASE [COVID-19]), AMPLIFIED PROBE TECHNIQUE, MAKING USE OF HIGH THROUGHPUT TECHNOLOGIES AS DESCRIBED BY CMS-2020-01-R: HCPCS | Performed by: FAMILY MEDICINE

## 2020-12-20 LAB
SARS-COV-2 RNA SPEC QL NAA+PROBE: NOT DETECTED
SPECIMEN SOURCE: NORMAL

## 2020-12-20 NOTE — RESULT ENCOUNTER NOTE
Dear Aishwarya    Your test results are attached. I am happy to let you know that they are stable.    The COVID test was negative.     Please contact me by Zii[employer] if you have any questions about your labs or management. You may also call my office number [phone] for any questions.     Makeda Bryan MD

## 2021-01-14 ENCOUNTER — TELEPHONE (OUTPATIENT)
Dept: FAMILY MEDICINE | Facility: CLINIC | Age: 56
End: 2021-01-14

## 2021-01-14 DIAGNOSIS — F41.1 GAD (GENERALIZED ANXIETY DISORDER): ICD-10-CM

## 2021-01-14 NOTE — TELEPHONE ENCOUNTER
Prescription approved per [facility] Refill Protocol.    Beena Russell RN, BSN, [facility]  [facility]

## 2021-01-14 NOTE — TELEPHONE ENCOUNTER
Reason for Call:  Other Question    Detailed comments: Patient is calling in today in regards medication. She is wondering if she needs blood work before she can get refills on Levothyroxine filled? She is trying to refill her medications on my chart and it is telling her she can't refill her medications at this time. Thank you  Phone Number Patient can be reached at: Home number on file [phone] (home)    Best Time: anytime    Can we leave a detailed message on this number? YES    Call taken on 1/14/2021 at 11:08 AM by Loree Ames

## 2021-01-15 NOTE — TELEPHONE ENCOUNTER
Please notify patient she is due for a non-fasting TSH lab. Orders are in Epic; please assist in scheduling.       Jose Gutierrez RN, BSN, PHN

## 2021-01-25 DIAGNOSIS — E03.4 HYPOTHYROIDISM DUE TO ACQUIRED ATROPHY OF THYROID: ICD-10-CM

## 2021-01-25 PROCEDURE — 36415 COLL VENOUS BLD VENIPUNCTURE: CPT | Performed by: FAMILY MEDICINE

## 2021-01-25 PROCEDURE — 84439 ASSAY OF FREE THYROXINE: CPT | Performed by: FAMILY MEDICINE

## 2021-01-25 PROCEDURE — 84443 ASSAY THYROID STIM HORMONE: CPT | Performed by: FAMILY MEDICINE

## 2021-01-26 DIAGNOSIS — E03.4 HYPOTHYROIDISM DUE TO ACQUIRED ATROPHY OF THYROID: ICD-10-CM

## 2021-01-26 LAB
T4 FREE SERPL-MCNC: 1.1 NG/DL (ref 0.76–1.46)
TSH SERPL DL<=0.005 MIU/L-ACNC: 0.31 MU/L (ref 0.4–4)

## 2021-01-26 RX ORDER — LEVOTHYROXINE SODIUM 150 UG/1
150 TABLET ORAL DAILY
Qty: 90 TABLET | Refills: 3 | Status: SHIPPED | OUTPATIENT
Start: 2021-01-26 | End: 2022-01-17

## 2021-01-26 NOTE — RESULT ENCOUNTER NOTE
Dear Aishwarya    Your test results are attached. I am happy to let you know that they are stable.    The TSH is a little low and this is your thyroid saying that you are on too much thyroid medication, but your Free T4 is in good range. You can continue the same dose. We can recheck labs in 6 months.     Please contact me by [employer] if you have any questions about your labs or management. You may also call my office number [phone] for any questions.     Makeda Bryan MD

## 2021-01-28 ENCOUNTER — APPOINTMENT (OUTPATIENT)
Dept: OPTOMETRY | Facility: CLINIC | Age: 56
End: 2021-01-28
Payer: COMMERCIAL

## 2021-01-28 PROCEDURE — 92341 FIT SPECTACLES BIFOCAL: CPT | Performed by: OPTOMETRIST

## 2021-02-20 ENCOUNTER — HEALTH MAINTENANCE LETTER (OUTPATIENT)
Age: 56
End: 2021-02-20

## 2021-06-06 ENCOUNTER — HEALTH MAINTENANCE LETTER (OUTPATIENT)
Age: 56
End: 2021-06-06

## 2021-07-05 ENCOUNTER — OFFICE VISIT (OUTPATIENT)
Dept: FAMILY MEDICINE | Facility: CLINIC | Age: 56
End: 2021-07-05
Payer: COMMERCIAL

## 2021-07-05 VITALS
BODY MASS INDEX: 29.25 KG/M2 | TEMPERATURE: 98.5 F | HEIGHT: 66 IN | DIASTOLIC BLOOD PRESSURE: 87 MMHG | SYSTOLIC BLOOD PRESSURE: 131 MMHG | OXYGEN SATURATION: 97 % | HEART RATE: 80 BPM | WEIGHT: 182 LBS

## 2021-07-05 DIAGNOSIS — Z12.31 VISIT FOR SCREENING MAMMOGRAM: ICD-10-CM

## 2021-07-05 DIAGNOSIS — N89.8 VAGINAL DISCHARGE: ICD-10-CM

## 2021-07-05 DIAGNOSIS — G47.00 PERSISTENT INSOMNIA: ICD-10-CM

## 2021-07-05 DIAGNOSIS — E03.4 HYPOTHYROIDISM DUE TO ACQUIRED ATROPHY OF THYROID: Primary | ICD-10-CM

## 2021-07-05 DIAGNOSIS — F43.23 SITUATIONAL MIXED ANXIETY AND DEPRESSIVE DISORDER: ICD-10-CM

## 2021-07-05 DIAGNOSIS — F32.0 CURRENT MILD EPISODE OF MAJOR DEPRESSIVE DISORDER WITHOUT PRIOR EPISODE (H): ICD-10-CM

## 2021-07-05 DIAGNOSIS — R10.84 ABDOMINAL PAIN, GENERALIZED: ICD-10-CM

## 2021-07-05 LAB
ERYTHROCYTE [DISTWIDTH] IN BLOOD BY AUTOMATED COUNT: 12.2 % (ref 10–15)
HCT VFR BLD AUTO: 39.9 % (ref 35–47)
HGB BLD-MCNC: 12.8 G/DL (ref 11.7–15.7)
MCH RBC QN AUTO: 29.2 PG (ref 26.5–33)
MCHC RBC AUTO-ENTMCNC: 32.1 G/DL (ref 31.5–36.5)
MCV RBC AUTO: 91 FL (ref 78–100)
PLATELET # BLD AUTO: 273 10E9/L (ref 150–450)
RBC # BLD AUTO: 4.39 10E12/L (ref 3.8–5.2)
SPECIMEN SOURCE: NORMAL
WBC # BLD AUTO: 5.1 10E9/L (ref 4–11)
WET PREP SPEC: NORMAL

## 2021-07-05 PROCEDURE — 87210 SMEAR WET MOUNT SALINE/INK: CPT | Performed by: FAMILY MEDICINE

## 2021-07-05 PROCEDURE — 36415 COLL VENOUS BLD VENIPUNCTURE: CPT | Performed by: FAMILY MEDICINE

## 2021-07-05 PROCEDURE — 99214 OFFICE O/P EST MOD 30 MIN: CPT | Performed by: FAMILY MEDICINE

## 2021-07-05 PROCEDURE — 85027 COMPLETE CBC AUTOMATED: CPT | Performed by: FAMILY MEDICINE

## 2021-07-05 PROCEDURE — 80053 COMPREHEN METABOLIC PANEL: CPT | Performed by: FAMILY MEDICINE

## 2021-07-05 PROCEDURE — 84443 ASSAY THYROID STIM HORMONE: CPT | Performed by: FAMILY MEDICINE

## 2021-07-05 RX ORDER — TRAZODONE HYDROCHLORIDE 50 MG/1
50 TABLET, FILM COATED ORAL AT BEDTIME
Qty: 30 TABLET | Refills: 3 | Status: SHIPPED | OUTPATIENT
Start: 2021-07-05

## 2021-07-05 ASSESSMENT — MIFFLIN-ST. JEOR: SCORE: 1422.3
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
## 2021-07-06 ENCOUNTER — MYC MEDICAL ADVICE (OUTPATIENT)
Dept: FAMILY MEDICINE | Facility: CLINIC | Age: 56
End: 2021-07-06

## 2021-07-06 DIAGNOSIS — B37.9 YEAST INFECTION: Primary | ICD-10-CM

## 2021-07-06 LAB
ALBUMIN SERPL-MCNC: 4.3 G/DL (ref 3.4–5)
ALP SERPL-CCNC: 102 U/L (ref 40–150)
ALT SERPL W P-5'-P-CCNC: 29 U/L (ref 0–50)
ANION GAP SERPL CALCULATED.3IONS-SCNC: 4 MMOL/L (ref 3–14)
AST SERPL W P-5'-P-CCNC: 17 U/L (ref 0–45)
BILIRUB SERPL-MCNC: 0.2 MG/DL (ref 0.2–1.3)
BUN SERPL-MCNC: 20 MG/DL (ref 7–30)
CALCIUM SERPL-MCNC: 9.4 MG/DL (ref 8.5–10.1)
CHLORIDE SERPL-SCNC: 108 MMOL/L (ref 94–109)
CO2 SERPL-SCNC: 28 MMOL/L (ref 20–32)
CREAT SERPL-MCNC: 0.97 MG/DL (ref 0.52–1.04)
GFR SERPL CREATININE-BSD FRML MDRD: 64 ML/MIN/{1.73_M2}
GLUCOSE SERPL-MCNC: 90 MG/DL (ref 70–99)
POTASSIUM SERPL-SCNC: 4.1 MMOL/L (ref 3.4–5.3)
PROT SERPL-MCNC: 7.4 G/DL (ref 6.8–8.8)
SODIUM SERPL-SCNC: 140 MMOL/L (ref 133–144)
TSH SERPL DL<=0.005 MIU/L-ACNC: 0.57 MU/L (ref 0.4–4)

## 2021-07-06 RX ORDER — FLUCONAZOLE 150 MG/1
150 TABLET ORAL ONCE
Qty: 1 TABLET | Refills: 1 | Status: SHIPPED | OUTPATIENT
Start: 2021-07-06 | End: 2021-07-06

## 2021-07-06 NOTE — RESULT ENCOUNTER NOTE
Dear Aishwarya    Your test results are attached. I am happy to let you know that they are stable.    All of your blood work is normal and you do not have a vaginal infection. I think that your abdominal symptoms are from too much stress.     Please contact me by [email or web address] if you have any questions about your labs or management. You may also call my office number [phone] for any questions.     Makeda Bryan MD

## 2021-07-07 ASSESSMENT — PATIENT HEALTH QUESTIONNAIRE - PHQ9
SUM OF ALL RESPONSES TO PHQ QUESTIONS 1-9: 11
5. POOR APPETITE OR OVEREATING: MORE THAN HALF THE DAYS

## 2021-07-07 ASSESSMENT — ANXIETY QUESTIONNAIRES
1. FEELING NERVOUS, ANXIOUS, OR ON EDGE: MORE THAN HALF THE DAYS
6. BECOMING EASILY ANNOYED OR IRRITABLE: NOT AT ALL
IF YOU CHECKED OFF ANY PROBLEMS ON THIS QUESTIONNAIRE, HOW DIFFICULT HAVE THESE PROBLEMS MADE IT FOR YOU TO DO YOUR WORK, TAKE CARE OF THINGS AT HOME, OR GET ALONG WITH OTHER PEOPLE: NOT DIFFICULT AT ALL
2. NOT BEING ABLE TO STOP OR CONTROL WORRYING: MORE THAN HALF THE DAYS
5. BEING SO RESTLESS THAT IT IS HARD TO SIT STILL: NOT AT ALL
7. FEELING AFRAID AS IF SOMETHING AWFUL MIGHT HAPPEN: MORE THAN HALF THE DAYS
GAD7 TOTAL SCORE: 9
3. WORRYING TOO MUCH ABOUT DIFFERENT THINGS: SEVERAL DAYS

## 2021-07-08 ASSESSMENT — ANXIETY QUESTIONNAIRES: GAD7 TOTAL SCORE: 9

## 2021-09-26 ENCOUNTER — HEALTH MAINTENANCE LETTER (OUTPATIENT)
Age: 56
End: 2021-09-26

## 2021-11-01 ENCOUNTER — TELEPHONE (OUTPATIENT)
Dept: FAMILY MEDICINE | Facility: CLINIC | Age: 56
End: 2021-11-01

## 2021-11-01 DIAGNOSIS — R10.2 PELVIC PAIN IN FEMALE: Primary | ICD-10-CM

## 2021-11-01 NOTE — TELEPHONE ENCOUNTER
Reason for Call:  Other call back    Detailed comments: Pt has cyst on left ovary. In pain.  Pt would like to know if she can be seen by Dr. Miguel    Phone Number Patient can be reached at: Home number on file [phone] (home)    Best Time: anytime    Can we leave a detailed message on this number? YES    Call taken on 11/1/2021 at 1:27 PM by Andreas Garcia

## 2021-11-01 NOTE — TELEPHONE ENCOUNTER
Pt last seen 1/22/2015 for ovarian cyst consult.    Pt having concerns for a possible L) ovarian cyst as she is having pain in lower L) abdomen again. Pt denies vaginal bleeding, itching, or abnormal discharge.    Pt asking if an US order can be placed, RN states being she has not been seen in 5 years she will likely need an appt first. Pt asking if this appt can be over the phone.    RN routing to provider for advisement on US order or if appt first can be over the phone.    Yvonne Tai RN on 11/1/2021 at 2:06 PM

## 2021-11-02 NOTE — TELEPHONE ENCOUNTER
Terri Miguel, DO  [facility] Triage 13 hours ago (6:13 PM)     MM    Please have this pt schedule a pelvic US followed by a clinic appt so that we can determine follow up.  The clinic appt could be via a telephone visit the following day since it may take one day for the US read. I have placed the US order.      Relayed Dr. Miguel's message above to pt.  She will call imaging scheduling to schedule an US and then she will call to schedule a phone visit with Dr. Miguel to discuss results.    Kari Conway RN

## 2021-11-04 ENCOUNTER — ANCILLARY PROCEDURE (OUTPATIENT)
Dept: ULTRASOUND IMAGING | Facility: CLINIC | Age: 56
End: 2021-11-04
Attending: OBSTETRICS & GYNECOLOGY
Payer: COMMERCIAL

## 2021-11-04 DIAGNOSIS — R10.2 PELVIC PAIN IN FEMALE: ICD-10-CM

## 2021-11-04 PROCEDURE — 76856 US EXAM PELVIC COMPLETE: CPT | Performed by: RADIOLOGY

## 2021-11-04 PROCEDURE — 76830 TRANSVAGINAL US NON-OB: CPT | Performed by: RADIOLOGY

## 2021-11-09 ENCOUNTER — TELEPHONE (OUTPATIENT)
Dept: OBGYN | Facility: CLINIC | Age: 56
End: 2021-11-09
Payer: COMMERCIAL

## 2021-11-09 DIAGNOSIS — N83.202 LEFT OVARIAN CYST: Primary | ICD-10-CM

## 2021-11-09 NOTE — TELEPHONE ENCOUNTER
[facility]    Phone Message    May a detailed message be left on voicemail: yes     Reason for Call: Requesting Results   Name/type of test: Ultrasound Pelvic  Date of test: 11/4/2021  Was test done at a location other than [facility] (Please fill in the location if not [facility])?: No    Please advise. Thank you.    Action Taken: Message routed to:  [facility] p [phone]    Travel Screening: Not Applicable

## 2022-02-13 DIAGNOSIS — F41.1 GAD (GENERALIZED ANXIETY DISORDER): ICD-10-CM

## 2022-03-03 ENCOUNTER — VIRTUAL VISIT (OUTPATIENT)
Dept: FAMILY MEDICINE | Facility: CLINIC | Age: 57
End: 2022-03-03
Payer: COMMERCIAL

## 2022-03-03 DIAGNOSIS — G44.219 EPISODIC TENSION-TYPE HEADACHE, NOT INTRACTABLE: Primary | ICD-10-CM

## 2022-03-03 PROCEDURE — 99213 OFFICE O/P EST LOW 20 MIN: CPT | Mod: 95 | Performed by: FAMILY MEDICINE

## 2022-03-03 RX ORDER — PREDNISONE 20 MG/1
20 TABLET ORAL 2 TIMES DAILY
Qty: 14 TABLET | Refills: 0 | Status: SHIPPED | OUTPATIENT
Start: 2022-03-03 | End: 2022-03-10

## 2022-03-03 NOTE — PROGRESS NOTES
"Aishwarya is a 56 year old who is being evaluated via a billable telephone visit.      What phone number would you like to be contacted at? [phone]  How would you like to obtain your AVS? Marva    Assessment & Plan     (G44.219) Episodic tension-type headache, not intractable  (primary encounter diagnosis)  Comment: Etiology unclear, reviewed unenhanced head CT which is negative for mass lesion, intracranial bleed, sinusitis.  Does not seem migrainous, consider cluster headache.  Will try a course of oral steroids and see if there is a clinical response.  Plan: predniSONE (DELTASONE) 20 MG tablet        Reviewed side effects.  Advised to follow-up if there is no improvement.    Patient Instructions   Not sure about the cause of your headache.     This could be a cluster headache.     Will try a course of prednisone, a steroid, to see if this will help.     If worse, or not better in one week, please contact your clinic.       6}     BMI:   Estimated body mass index is 29.38 kg/m  as calculated from the following:    Height as of 7/5/21: 1.676 m (5' 6\").    Weight as of 7/5/21: 82.6 kg (182 lb).     Return if symptoms worsen or fail to improve.    Lexis Padilla MD  [facility] YING Neff is a 56 year old who presents for the following health issues    HPI     ED/UC Followup:    Facility:  [facility]  Date of visit: 02/23/22  Reason for visit: Extertional HA  Current Status: HA persisting     Ally DeShong CMA      Review of Systems   CONSTITUTIONAL: NEGATIVE for fever, chills, change in weight  ENT/MOUTH: NEGATIVE for ear, mouth and throat problems  RESP: NEGATIVE for significant cough or SOB  CV: NEGATIVE for chest pain, palpitations or peripheral edema  NEURO: Above-noted headache.      Objective           Vitals:  No vitals were obtained today due to virtual visit.    Physical Exam   healthy, alert and no distress  PSYCH: Alert   Her affect is normal  RESP: No cough, " See messages no audible wheezing, able to talk in full sentences  Remainder of exam unable to be completed due to telephone visits        Phone call duration: 20 minutes    Lexis Padilla MD

## 2022-03-06 NOTE — PATIENT INSTRUCTIONS
Not sure about the cause of your headache.     This could be a cluster headache.     Will try a course of prednisone, a steroid, to see if this will help.     If worse, or not better in one week, please contact your clinic.

## 2022-03-13 ENCOUNTER — HEALTH MAINTENANCE LETTER (OUTPATIENT)
Age: 57
End: 2022-03-13

## 2022-04-05 ENCOUNTER — ANCILLARY PROCEDURE (OUTPATIENT)
Dept: MRI IMAGING | Facility: CLINIC | Age: 57
End: 2022-04-05
Attending: FAMILY MEDICINE
Payer: COMMERCIAL

## 2022-04-05 DIAGNOSIS — M54.12 CERVICAL RADICULOPATHY: ICD-10-CM

## 2022-04-05 PROCEDURE — 72141 MRI NECK SPINE W/O DYE: CPT | Mod: TC | Performed by: RADIOLOGY

## 2022-05-23 ENCOUNTER — MYC REFILL (OUTPATIENT)
Dept: FAMILY MEDICINE | Facility: CLINIC | Age: 57
End: 2022-05-23
Payer: COMMERCIAL

## 2022-05-23 DIAGNOSIS — R10.13 DYSPEPSIA: ICD-10-CM

## 2022-05-23 DIAGNOSIS — F41.1 GAD (GENERALIZED ANXIETY DISORDER): ICD-10-CM

## 2022-05-24 NOTE — TELEPHONE ENCOUNTER
Patient calling regarding her refill request.  Isabel Dinh MA  [facility]  2nd Floor  Primary Care

## 2022-05-25 NOTE — TELEPHONE ENCOUNTER
Needs to establish care with new provider.    Carley Jones RN  [facility]

## 2022-05-26 ENCOUNTER — VIRTUAL VISIT (OUTPATIENT)
Dept: FAMILY MEDICINE | Facility: CLINIC | Age: 57
End: 2022-05-26
Payer: COMMERCIAL

## 2022-05-26 DIAGNOSIS — F41.1 GAD (GENERALIZED ANXIETY DISORDER): Primary | ICD-10-CM

## 2022-05-26 DIAGNOSIS — F32.0 CURRENT MILD EPISODE OF MAJOR DEPRESSIVE DISORDER WITHOUT PRIOR EPISODE (H): ICD-10-CM

## 2022-05-26 DIAGNOSIS — E03.4 HYPOTHYROIDISM DUE TO ACQUIRED ATROPHY OF THYROID: ICD-10-CM

## 2022-05-26 PROCEDURE — 99213 OFFICE O/P EST LOW 20 MIN: CPT | Mod: 95 | Performed by: FAMILY MEDICINE

## 2022-05-26 ASSESSMENT — ANXIETY QUESTIONNAIRES
IF YOU CHECKED OFF ANY PROBLEMS ON THIS QUESTIONNAIRE, HOW DIFFICULT HAVE THESE PROBLEMS MADE IT FOR YOU TO DO YOUR WORK, TAKE CARE OF THINGS AT HOME, OR GET ALONG WITH OTHER PEOPLE: NOT DIFFICULT AT ALL
7. FEELING AFRAID AS IF SOMETHING AWFUL MIGHT HAPPEN: NOT AT ALL
2. NOT BEING ABLE TO STOP OR CONTROL WORRYING: NOT AT ALL
GAD7 TOTAL SCORE: 0
3. WORRYING TOO MUCH ABOUT DIFFERENT THINGS: NOT AT ALL
6. BECOMING EASILY ANNOYED OR IRRITABLE: NOT AT ALL
GAD7 TOTAL SCORE: 0
1. FEELING NERVOUS, ANXIOUS, OR ON EDGE: NOT AT ALL
5. BEING SO RESTLESS THAT IT IS HARD TO SIT STILL: NOT AT ALL

## 2022-05-26 ASSESSMENT — PATIENT HEALTH QUESTIONNAIRE - PHQ9
SUM OF ALL RESPONSES TO PHQ QUESTIONS 1-9: 0
5. POOR APPETITE OR OVEREATING: NOT AT ALL

## 2022-05-26 NOTE — PROGRESS NOTES
"Aishwarya is a 57 year old who is being evaluated via a billable video visit.      How would you like to obtain your AVS? MyChart  If the video visit is dropped, the invitation should be resent by: Text to cell phone: [phone]  Will anyone else be joining your video visit? No      Assessment & Plan     (F41.1) HARLEY (generalized anxiety disorder)  (primary encounter diagnosis)  Comment: Doing well with SSRI therapy, tolerating medication without side effects.  Plan: FLUoxetine (PROZAC) 20 MG capsule       We will continue.  Follow-up in 1 year.    (E03.4) Hypothyroidism due to acquired atrophy of thyroid  Comment: Clinically euthyroid, future orders placed for TSH testing.  Plan: TSH        See patient instructions.    (F32.0) Current mild episode of major depressive disorder without prior episode (H)  Comment: Appears resolved.  Plan: Continue SSRI therapy.    Patient Instructions   Sounds like the medication, fluoxetine, is helping.    I would recommend that you continue on this medication.  I did send in refills for 1 year.  You currently have a 90-day prescription at your pharmacy.    Please call our clinic phone number, or use [employer], to schedule a lab appointment.  I believe there are after hours and Saturday appointments available.  This would be a nonfasting blood test to check your thyroid function.    Sometime in the next few months, I would recommend a wellness visit.    Please call, or use [employer], with any questions or concerns.    BMI:   Estimated body mass index is 29.38 kg/m  as calculated from the following:    Height as of 7/5/21: 1.676 m (5' 6\").    Weight as of 7/5/21: 82.6 kg (182 lb).   Weight management plan: Discussed healthy diet and exercise guidelines      Return in about 1 month (around 6/26/2022) for Lab Work.    Lexis Padilla MD  [facility]    Pop Neff is a 57 year old who presents for the following health issues     HPI     Patient requesting future " labs for thyroid    Depression Followup    How are you doing with your depression since your last visit? No change    Are you having other symptoms that might be associated with depression? No    Have you had a significant life event?  No     Are you feeling anxious or having panic attacks?   Yes:  occasional    Do you have any concerns with your use of alcohol or other drugs? No    Social History     Tobacco Use     Smoking status: Never Smoker     Smokeless tobacco: Never Used   Vaping Use     Vaping Use: Never used   Substance Use Topics     Alcohol use: Yes     Alcohol/week: 2.0 standard drinks     Types: 2 Standard drinks or equivalent per week     Comment: Socially     Drug use: No     PHQ 5/1/2020 7/7/2021 5/26/2022   PHQ-9 Total Score 0 11 0   Q9: Thoughts of better off dead/self-harm past 2 weeks Not at all Not at all Not at all     HARLEY-7 SCORE 5/1/2020 7/7/2021 5/26/2022   Total Score 0 9 0     Last PHQ-9 5/26/2022   1.  Little interest or pleasure in doing things 0   2.  Feeling down, depressed, or hopeless 0   3.  Trouble falling or staying asleep, or sleeping too much 0   4.  Feeling tired or having little energy 0   5.  Poor appetite or overeating 0   6.  Feeling bad about yourself 0   7.  Trouble concentrating 0   8.  Moving slowly or restless 0   Q9: Thoughts of better off dead/self-harm past 2 weeks 0   PHQ-9 Total Score 0   Difficulty at work, home, or with people Not difficult at all     HARLEY-7  5/26/2022   1. Feeling nervous, anxious, or on edge 0   2. Not being able to stop or control worrying 0   3. Worrying too much about different things 0   4. Trouble relaxing 0   5. Being so restless that it is hard to sit still 0   6. Becoming easily annoyed or irritable 0   7. Feeling afraid, as if something awful might happen 0   HARLEY-7 Total Score 0   If you checked any problems, how difficult have they made it for you to do your work, take care of things at home, or get along with other people? Not  difficult at all         Review of Systems   CONSTITUTIONAL: NEGATIVE for fever, chills, change in weight  ENT/MOUTH: NEGATIVE for ear, mouth and throat problems  RESP: NEGATIVE for significant cough or SOB  CV: NEGATIVE for chest pain, palpitations or peripheral edema  ENDOCRINE: NEGATIVE for temperature intolerance, skin/hair changes  PSYCHIATRIC: NEGATIVE for changes in mood or affect      Objective           Vitals:  No vitals were obtained today due to virtual visit.    Physical Exam   GENERAL: Healthy, alert and no distress  RESP: No audible wheeze, cough,  NEURO:  Mentation and speech appropriate for age.  PSYCH: Mentation appears normal, affect normal/bright, judgement and insight intact, normal speech    Lexis Padilla MD          Video-Visit Details    Type of service:  Video Visit.  Converted to phone visit per patient request.    Duration of phone visit: 16 minutes.

## 2022-05-26 NOTE — PATIENT INSTRUCTIONS
Sounds like the medication, fluoxetine, is helping.    I would recommend that you continue on this medication.  I did send in refills for 1 year.  You currently have a 90-day prescription at your pharmacy.    Please call our clinic phone number, or use [employer], to schedule a lab appointment.  I believe there are after hours and Saturday appointments available.  This would be a nonfasting blood test to check your thyroid function.    Sometime in the next few months, I would recommend a wellness visit.    Please call, or use [employer], with any questions or concerns.

## 2022-06-09 DIAGNOSIS — E03.4 HYPOTHYROIDISM DUE TO ACQUIRED ATROPHY OF THYROID: ICD-10-CM

## 2022-06-10 RX ORDER — LEVOTHYROXINE SODIUM 150 UG/1
150 TABLET ORAL DAILY
Qty: 90 TABLET | Refills: 0 | Status: SHIPPED | OUTPATIENT
Start: 2022-06-10 | End: 2022-06-24

## 2022-06-10 NOTE — TELEPHONE ENCOUNTER
Routing refill request to provider for review/approval because:  Please review and advise, previous prescriber retired. Has not established care with a new primary care provider

## 2022-08-05 ENCOUNTER — OFFICE VISIT (OUTPATIENT)
Dept: OPTOMETRY | Facility: CLINIC | Age: 57
End: 2022-08-05
Payer: COMMERCIAL

## 2022-08-05 DIAGNOSIS — H10.13 ALLERGIC CONJUNCTIVITIS OF BOTH EYES: ICD-10-CM

## 2022-08-05 DIAGNOSIS — H52.03 HYPEROPIA, BILATERAL: ICD-10-CM

## 2022-08-05 DIAGNOSIS — Z01.00 EXAMINATION OF EYES AND VISION: Primary | ICD-10-CM

## 2022-08-05 DIAGNOSIS — H04.123 DRY EYE SYNDROME OF BOTH EYES: ICD-10-CM

## 2022-08-05 DIAGNOSIS — R68.89 SUSPECTED GLAUCOMA OF BOTH EYES: ICD-10-CM

## 2022-08-05 DIAGNOSIS — H52.223 REGULAR ASTIGMATISM OF BOTH EYES: ICD-10-CM

## 2022-08-05 DIAGNOSIS — H52.4 PRESBYOPIA: ICD-10-CM

## 2022-08-05 PROCEDURE — 92015 DETERMINE REFRACTIVE STATE: CPT | Performed by: OPTOMETRIST

## 2022-08-05 PROCEDURE — 92014 COMPRE OPH EXAM EST PT 1/>: CPT | Performed by: OPTOMETRIST

## 2022-08-05 RX ORDER — MINERAL OIL, PETROLATUM 425; 573 MG/G; MG/G
OINTMENT OPHTHALMIC
Qty: 3.5 G | Refills: 11 | Status: SHIPPED | OUTPATIENT
Start: 2022-08-05 | End: 2024-02-13

## 2022-08-05 RX ORDER — POLYETHYLENE GLYCOL 400 AND PROPYLENE GLYCOL 4; 3 MG/ML; MG/ML
1 SOLUTION/ DROPS OPHTHALMIC 4 TIMES DAILY
Qty: 6 ML | Refills: 12 | Status: SHIPPED | OUTPATIENT
Start: 2022-08-05 | End: 2024-02-13

## 2022-08-05 RX ORDER — OLOPATADINE HYDROCHLORIDE 2 MG/ML
1 SOLUTION/ DROPS OPHTHALMIC DAILY
Qty: 2.5 ML | Refills: 11 | Status: SHIPPED | OUTPATIENT
Start: 2022-08-05 | End: 2023-08-05

## 2022-08-05 ASSESSMENT — REFRACTION_WEARINGRX
OS_AXIS: 170
OS_ADD: +1.75
OD_AXIS: 010
SPECS_TYPE: OTC READERS
OS_AXIS: 172
OS_SPHERE: PLANO
OS_SPHERE: +2.00
OD_SPHERE: +2.00
OD_SPHERE: +0.25
OS_CYLINDER: +0.50
OD_SPHERE: +2.00
OS_CYLINDER: SPHERE
OD_CYLINDER: SPHERE
OD_CYLINDER: +0.75
OS_SPHERE: +2.00
OD_CYLINDER: +0.50
OS_CYLINDER: +0.50
OD_ADD: +1.75
OD_AXIS: 010

## 2022-08-05 ASSESSMENT — VISUAL ACUITY
OD_CC: 20/25
OD_SC: 20/40
CORRECTION_TYPE: GLASSES
OS_SC+: -1
OS_SC: 20/30
OS_CC: 20/25
OD_CC: 20/40
OD_CC+: -1
OS_CC: 20/40
OD_SC+: -1
METHOD: SNELLEN - LINEAR

## 2022-08-05 ASSESSMENT — CONF VISUAL FIELD
OS_NORMAL: 1
OD_NORMAL: 1

## 2022-08-05 ASSESSMENT — EXTERNAL EXAM - RIGHT EYE: OD_EXAM: NORMAL

## 2022-08-05 ASSESSMENT — REFRACTION_MANIFEST
OS_CYLINDER: +0.50
OS_ADD: +2.00
OD_ADD: +2.00
OD_SPHERE: +0.25
OD_CYLINDER: +1.00
OS_SPHERE: PLANO
OS_AXIS: 170
OD_AXIS: 010

## 2022-08-05 ASSESSMENT — KERATOMETRY
OD_AXISANGLE2_DEGREES: 95
OS_K1POWER_DIOPTERS: 43.50
OS_K2POWER_DIOPTERS: 44.00
OS_AXISANGLE_DEGREES: 154
OD_K2POWER_DIOPTERS: 43.75
OD_AXISANGLE_DEGREES: 5
OS_AXISANGLE2_DEGREES: 64
OD_K1POWER_DIOPTERS: 43.25

## 2022-08-05 ASSESSMENT — PACHYMETRY
OD_CT(UM): .543
OS_CT(UM): .542

## 2022-08-05 ASSESSMENT — TONOMETRY
OD_IOP_MMHG: 15
OS_IOP_MMHG: 15
IOP_METHOD: TONOPEN

## 2022-08-05 ASSESSMENT — CUP TO DISC RATIO
OS_RATIO: 0.5
OD_RATIO: 0.65

## 2022-08-05 ASSESSMENT — SLIT LAMP EXAM - LIDS
COMMENTS: MEIBOMIAN GLAND DYSFUNCTION, DERMATOCHALASIS
COMMENTS: MEIBOMIAN GLAND DYSFUNCTION, DERMATOCHALASIS

## 2022-08-05 ASSESSMENT — EXTERNAL EXAM - LEFT EYE: OS_EXAM: NORMAL

## 2022-08-05 NOTE — PATIENT INSTRUCTIONS
Systane Ultra 1 drop both eyes 3 x day.    Refresh pm at night.    Heat to the eyes daily for 10-15 minutes nightly with warm washcloth or reusable gel masks from the pharmacy or  Motobuykers heat masks can be purchased at Amazon.    Ocusoft lid scrubs at night.      Pataday to be used once daily for itchy eyes.  Use as needed. Contact your pharmacy for refills.    Eyeglass prescription given.    Return in 1 year for a complete eye exam or sooner if needed.    Eric Chu, OD    The affects of the dilating drops last for 4- 6 hours.  You will be more sensitive to light and vision will be blurry up close.  Do not drive if you do not feel comfortable.  Mydriatic sunglasses were given if needed.      Optometry Providers       Clinic Locations                                 Telephone Number   Dr. Gloria Patino   [place]  [facility] [phone]     [facility] Hours:                [place] [facility] Hours:       [facility] Hours:   [address]   [address] [facility] [facility]     [address] [place] [address]   [place] [address]    [place] [address]  Phone: [phone]                    Phone: [phone]     Phone: [phone]                      Monday 8:00-6:00                          Monday 8:00-6:00                          Monday 8:00-6:00              Tuesday 8:00-6:00                          Tuesday 8:00-6:00                          Tuesday 8:00-6:00              Wednesday 8:00-6:00                  Wednesday 8:00-6:00                   Wednesday 8:00-6:00      Thursday 8:00-6:00                        Thursday 8:00-6:00                         Thursday 8:00-6:00            Friday 8:00-5:00                              Friday 8:00-5:00                              Friday 8:00-5:00    [facility] Hours:   [address] [place]  [address]  [phone]    Monday 9:00-6:00  Tuesday 9:00-6:00  Wednesday 9:00-6:00  Thursday 9:00-6:00  Friday 9:00-5:00  Please log on to [email or web address] to order your contact lenses.  The link is found on the [facility] page.  As always, Thank you for trusting us with your health care needs!    There is a combination of three treatments which can greatly improve symptoms of dry eyes.     Artificial tears  Heat (eyes closed)  Eyelid and eyelash cleansing (eyes closed)     Use one drop of artificial tears both eyes 4 x daily.  Once in the morning, lunch, dinner and bedtime. Continue to use the drops regardless if your eyes are comfortable or not.  Artificial tears work best as a preventative and not as well after your eyes are starting to bother you.  It may take 4- 6 weeks of using the drops before you notice improvement.  If after that time you are still having problems schedule an appointment for an evaluation and discussion of different treatments such as Restasis or Xiidra.  Dry eyes are a chronic condition and you may have more symptoms at certain times of the year.    Excess tearing can be due to the right tears not working properly or a blockage in the tear drainage system.  You can try using artificial tears 1 drop both eyes 4 x day.  If the excess tearing is bothersome after 4-6 weeks of treatment then we can send you for further testing.  This would entail a referral to our oculoplastic specialist Dr. Soniya De Leon at the [facility]-[phone].    Recommended brands are:    Systane Complete  Systane Ultra  Systane Balance  Refresh Advanced Optive  Refresh Relieva  Blink    Recommended brands for contact lens wearers are:    Systane contacts  Refresh contacts  Blink contacts    If you are using drops more than 4 x day or have sensitivities to preservatives I recommend non preserved artificial tears.  These come in 1 use vials.  They can be used every 1-2 hours.  Do not reuse the  vials.    Recommended brands are:    Refresh Optive Daniel-3  Systane- preservative free  Refresh-  preservative free  Blink- preservative free    Gels or ointment can be used at night.    Recommended brands are:    Systane Gel  Refresh Gel  Blink Gel  Genteal Gel    Systane night time (ointment)  Refresh Celluvisc  Refresh PM (ointment)      Visine, Clear Eyes or Murine (drops that get the red out) can irritate the eyes and cause a rebound effect where the eyes become more red and you end up using more drops.  Avoid drops containing tetrahydrozoline, naphazoline, phenylephrine, oxymetazoline.      OTC Lumify is a newer product that gives immediate redness relief without the rebound effect.  Use as needed to take the redness out.    Artificial tears may be used with other drops (such as allergy, glaucoma, antibiotics) around the same time.  Be sure to wait 5 minutes in between drops.    Heat to the eyelids can also improve your symptoms of dry eyes.  Marguerite heat masks can be purchased at Amazon to be used nightly for 10-15 minutes.  Other options are gel masks that can be put in the microwave and purchased at most pharmacies.      Tea Tree Oil eyelid cleansers recommended are Ocusoft Oust foam cleanser to cleanse eyelids/lashes at night and in the am. Other options are Blephadex or Cliradex eyelid wipes.  KEEP EYES CLOSED when using these products.  These can be purchased on amazon.com   A good product for make up remover with tea tree oil is WeLoveEyes.  This can be found at www.[employer] or [employer].    Other good eyelid cleansers have hypochlorous which removes excess bacteria and is safe around the eyes. Products are Avenova, Ocusoft Hypochlor or Heyedrate. Spray solution onto cotton pad, close eyes and gently apply to eyelids and eyelashes using side to side motion.  You can also KEEP EYES CLOSED spray and rub into eyelashes.  You do not need to rinse it off. Use morning and evening. These products can  be found on Amazon.  You can check with your local pharmacy and see if they can order if for you if they don't have it.    Other brands of eyelid cleansing wipes are:    Ocusoft wipes  Systane wipes    A great eye make up line is [email or web address]

## 2022-08-05 NOTE — PROGRESS NOTES
Chief Complaint   Patient presents with     Annual Eye Exam         Last Eye Exam: 7/31/2020  Dilated Previously: Yes    What are you currently using to see? OTC readers +1.75 and +2.00, RX glasses for driving and tv       Distance Vision Acuity: Noticed gradual change in both eyes    Near Vision Acuity: Not satisfied     Eye Comfort: dry, waking up in the middle of the night because eyes are dry and itching so much, Right eye seems to have more trouble, sometimes feels like there is sand imbedded in eye  Do you use eye drops? 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       OBJECTIVE: See Ophthalmology exam    ASSESSMENT:    ICD-10-CM    1. Examination of eyes and vision  Z01.00 EYE EXAM (SIMPLE-NONBILLABLE)   2. Dry eye syndrome of both eyes  H04.123 EYE EXAM (SIMPLE-NONBILLABLE)     White Petrolatum-Mineral Oil (REFRESH P.M.) OINT     polyethylene glycol-propylene glycol (SYSTANE ULTRA) 0.4-0.3 % SOLN ophthalmic solution   3. Suspected glaucoma of both eyes  H40.003 EYE EXAM (SIMPLE-NONBILLABLE)     Adult Eye Referral   4. Allergic conjunctivitis of both eyes  H10.13 olopatadine (PATADAY) 0.2 % ophthalmic solution   5. Presbyopia  H52.4 REFRACTION   6. Hyperopia, bilateral  H52.03 REFRACTION   7. Regular astigmatism of both eyes  H52.223 REFRACTION       PLAN:     Patient Instructions   Systane Ultra 1 drop both eyes 3 x day.    Refresh pm at night.    Heat to the eyes daily for 10-15 minutes nightly with warm washcloth or reusable gel masks from the pharmacy or  Intelliworks heat masks can be purchased at Amazon.    Ocusoft lid scrubs at night.      Pataday to be used once daily for itchy eyes.  Use as needed. Contact your pharmacy for refills.    Eyeglass prescription given.    Return in 1 year for a complete eye exam or sooner if needed.    Eric Chu, OD

## 2022-08-05 NOTE — LETTER
8/5/2022         RE: Aishwarya Savage  [address]  [facility] [address]        Dear Colleague,    Thank you for referring your patient, Aishwarya Savage, to the [facility]. Please see a copy of my visit note below.    Chief Complaint   Patient presents with     Annual Eye Exam         Last Eye Exam: 7/31/2020  Dilated Previously: Yes    What are you currently using to see? OTC readers +1.75 and +2.00, RX glasses for driving and tv       Distance Vision Acuity: Noticed gradual change in both eyes    Near Vision Acuity: Not satisfied     Eye Comfort: dry, waking up in the middle of the night because eyes are dry and itching so much, Right eye seems to have more trouble, sometimes feels like there is sand imbedded in eye  Do you use eye drops? 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       OBJECTIVE: See Ophthalmology exam    ASSESSMENT:    ICD-10-CM    1. Examination of eyes and vision  Z01.00 EYE EXAM (SIMPLE-NONBILLABLE)   2. Dry eye syndrome of both eyes  H04.123 EYE EXAM (SIMPLE-NONBILLABLE)     White Petrolatum-Mineral Oil (REFRESH P.M.) OINT     polyethylene glycol-propylene glycol (SYSTANE ULTRA) 0.4-0.3 % SOLN ophthalmic solution   3. Suspected glaucoma of both eyes  H40.003 EYE EXAM (SIMPLE-NONBILLABLE)     Adult Eye Referral   4. Allergic conjunctivitis of both eyes  H10.13 olopatadine (PATADAY) 0.2 % ophthalmic solution   5. Presbyopia  H52.4 REFRACTION   6. Hyperopia, bilateral  H52.03 REFRACTION   7. Regular astigmatism of both eyes  H52.223 REFRACTION       PLAN:     Patient Instructions   Systane Ultra 1 drop both eyes 3 x day.    Refresh pm at night.    Heat to the eyes daily for 10-15 minutes nightly with warm washcloth or reusable gel masks from the pharmacy or  [facility] heat masks can be purchased at  Amazon.    Ocusoft lid scrubs at night.      Pataday to be used once daily for itchy eyes.  Use as needed. Contact your pharmacy for refills.    Eyeglass prescription given.    Return in 1 year for a complete eye exam or sooner if needed.    Eric Cuh, OD               Again, thank you for allowing me to participate in the care of your patient.        Sincerely,        Eric Chu, OD

## 2022-08-05 NOTE — Clinical Note
Poncho Bailey!  Could you please call Aishwarya to schedule glaucoma testing, IOP/OCT/VF with Dr. Pham- first available is fine.  Thank you!  Eric Chu OD

## 2022-08-16 ENCOUNTER — APPOINTMENT (OUTPATIENT)
Dept: OPTOMETRY | Facility: CLINIC | Age: 57
End: 2022-08-16
Payer: COMMERCIAL

## 2022-08-16 PROCEDURE — 92341 FIT SPECTACLES BIFOCAL: CPT | Performed by: OPTOMETRIST

## 2022-11-04 DIAGNOSIS — H40.003 GLAUCOMA SUSPECT OF BOTH EYES: Primary | ICD-10-CM

## 2022-11-07 ENCOUNTER — OFFICE VISIT (OUTPATIENT)
Dept: OPHTHALMOLOGY | Facility: CLINIC | Age: 57
End: 2022-11-07
Payer: COMMERCIAL

## 2022-11-07 DIAGNOSIS — H40.003 GLAUCOMA SUSPECT OF BOTH EYES: Primary | ICD-10-CM

## 2022-11-07 PROCEDURE — 92083 EXTENDED VISUAL FIELD XM: CPT | Performed by: OPHTHALMOLOGY

## 2022-11-07 PROCEDURE — 92002 INTRM OPH EXAM NEW PATIENT: CPT | Performed by: OPHTHALMOLOGY

## 2022-11-07 PROCEDURE — 92133 CPTRZD OPH DX IMG PST SGM ON: CPT | Performed by: OPHTHALMOLOGY

## 2022-11-07 ASSESSMENT — VISUAL ACUITY
OS_CC: 20/20
OS_CC+: -1
OD_CC: 20/20
CORRECTION_TYPE: GLASSES
METHOD: SNELLEN - LINEAR

## 2022-11-07 ASSESSMENT — EXTERNAL EXAM - LEFT EYE: OS_EXAM: NORMAL

## 2022-11-07 ASSESSMENT — PACHYMETRY
OS_CT(UM): .542
OD_CT(UM): .543

## 2022-11-07 ASSESSMENT — SLIT LAMP EXAM - LIDS
COMMENTS: NORMAL
COMMENTS: NORMAL

## 2022-11-07 ASSESSMENT — CUP TO DISC RATIO
OD_RATIO: 0.7
OS_RATIO: 0.6

## 2022-11-07 ASSESSMENT — TONOMETRY
OS_IOP_MMHG: 15
IOP_METHOD: APPLANATION
OD_IOP_MMHG: 15

## 2022-11-07 ASSESSMENT — EXTERNAL EXAM - RIGHT EYE: OD_EXAM: NORMAL

## 2022-11-07 NOTE — LETTER
11/7/2022         RE: Aishwarya Savage  [address] [place] [address]        Dear Colleague,    Thank you for referring your patient, Aishwarya Savage, to the [facility]. Please see a copy of my visit note below.     Current Eye Medications: Systane Ultra 1 drop both eyes  Prn both eyes     Subjective:  Referral from Dr. Chu for glaucoma suspect testing. Was here in 2012 as well. Grandmother had glaucoma.      Objective:  See Ophthalmology Exam.       Assessment:  Glaucoma OCT shows stable abnormal configuration about equivalent to 2012.  Ray Visual Field within normal limits both eyes.  Recent disc exam showed no significant abnormalities.      Plan:  Continue observation with regard to glaucoma suspect status.   Return visit in 2 years for an intraocular pressure check, glaucoma OCT, retinal OCT, and Ray Visual Field ( Glaucoma testing).   Call in April 2023 for an appointment in August 2023 for Complete Exam with Dr. Chu.    Dr. Pham [phone]          Again, thank you for allowing me to participate in the care of your patient.        Sincerely,        Kervin Pham MD

## 2022-11-07 NOTE — PROGRESS NOTES
Current Eye Medications: Systane Ultra 1 drop both eyes  Prn both eyes     Subjective:  Referral from Dr. Chu for glaucoma suspect testing. Was here in 2012 as well. Grandmother had glaucoma.      Objective:  See Ophthalmology Exam.       Assessment:  Glaucoma OCT shows stable abnormal configuration about equivalent to 2012.  Ray Visual Field within normal limits both eyes.  Recent disc exam showed no significant abnormalities.      Plan:  Continue observation with regard to glaucoma suspect status.   Return visit in 2 years for an intraocular pressure check, glaucoma OCT, retinal OCT, and Ray Visual Field ( Glaucoma testing).   Call in April 2023 for an appointment in August 2023 for Complete Exam with Dr. Chu.    Dr. Pham [phone]

## 2022-11-07 NOTE — PATIENT INSTRUCTIONS
Continue observation with regard to glaucoma suspect status.   Return visit in 2 years for an intraocular pressure check, glaucoma OCT, retinal OCT, and Ray Visual Field ( Glaucoma testing).   Call in April 2023 for an appointment in August 2023 for Complete Exam with Dr. Chu.  Dr. Pham [phone]

## 2022-11-22 DIAGNOSIS — E03.4 HYPOTHYROIDISM DUE TO ACQUIRED ATROPHY OF THYROID: ICD-10-CM

## 2022-11-22 RX ORDER — LEVOTHYROXINE SODIUM 150 UG/1
150 TABLET ORAL DAILY
Qty: 90 TABLET | Refills: 0 | Status: SHIPPED | OUTPATIENT
Start: 2022-11-22 | End: 2023-03-24

## 2022-11-28 DIAGNOSIS — R10.13 DYSPEPSIA: ICD-10-CM

## 2023-01-03 ENCOUNTER — MYC REFILL (OUTPATIENT)
Dept: FAMILY MEDICINE | Facility: CLINIC | Age: 58
End: 2023-01-03

## 2023-01-03 DIAGNOSIS — R10.13 DYSPEPSIA: ICD-10-CM

## 2023-01-04 ENCOUNTER — MYC MEDICAL ADVICE (OUTPATIENT)
Dept: FAMILY MEDICINE | Facility: CLINIC | Age: 58
End: 2023-01-04

## 2023-01-04 DIAGNOSIS — D50.9 IRON DEFICIENCY ANEMIA, UNSPECIFIED IRON DEFICIENCY ANEMIA TYPE: ICD-10-CM

## 2023-01-04 DIAGNOSIS — E03.4 HYPOTHYROIDISM DUE TO ACQUIRED ATROPHY OF THYROID: Primary | ICD-10-CM

## 2023-01-04 DIAGNOSIS — Z13.6 CARDIOVASCULAR SCREENING; LDL GOAL LESS THAN 160: ICD-10-CM

## 2023-01-04 DIAGNOSIS — R73.09 ABNORMAL GLUCOSE: ICD-10-CM

## 2023-01-04 NOTE — TELEPHONE ENCOUNTER
Routed to provider to advise.    Laurita RN,BSN  Triage Nurse  [facility]: [facility]  Ph: [phone]

## 2023-01-06 ENCOUNTER — LAB (OUTPATIENT)
Dept: LAB | Facility: CLINIC | Age: 58
End: 2023-01-06
Payer: COMMERCIAL

## 2023-01-06 DIAGNOSIS — D50.9 IRON DEFICIENCY ANEMIA, UNSPECIFIED IRON DEFICIENCY ANEMIA TYPE: ICD-10-CM

## 2023-01-06 DIAGNOSIS — Z13.6 CARDIOVASCULAR SCREENING; LDL GOAL LESS THAN 160: ICD-10-CM

## 2023-01-06 DIAGNOSIS — E03.4 HYPOTHYROIDISM DUE TO ACQUIRED ATROPHY OF THYROID: ICD-10-CM

## 2023-01-06 DIAGNOSIS — R73.09 ABNORMAL GLUCOSE: ICD-10-CM

## 2023-01-06 LAB
ANION GAP SERPL CALCULATED.3IONS-SCNC: 9 MMOL/L (ref 3–14)
BUN SERPL-MCNC: 16 MG/DL (ref 7–30)
CALCIUM SERPL-MCNC: 9.3 MG/DL (ref 8.5–10.1)
CHLORIDE BLD-SCNC: 107 MMOL/L (ref 94–109)
CHOLEST SERPL-MCNC: 170 MG/DL
CO2 SERPL-SCNC: 25 MMOL/L (ref 20–32)
CREAT SERPL-MCNC: 0.9 MG/DL (ref 0.52–1.04)
ERYTHROCYTE [DISTWIDTH] IN BLOOD BY AUTOMATED COUNT: 11.9 % (ref 10–15)
FASTING STATUS PATIENT QL REPORTED: NO
FERRITIN SERPL-MCNC: 55 NG/ML (ref 8–252)
GFR SERPL CREATININE-BSD FRML MDRD: 73 ML/MIN/1.73M2
GLUCOSE BLD-MCNC: 98 MG/DL (ref 70–99)
HBA1C MFR BLD: 5.7 % (ref 0–5.6)
HCT VFR BLD AUTO: 36.5 % (ref 35–47)
HDLC SERPL-MCNC: 64 MG/DL
HGB BLD-MCNC: 12.1 G/DL (ref 11.7–15.7)
LDLC SERPL CALC-MCNC: 92 MG/DL
MCH RBC QN AUTO: 29.5 PG (ref 26.5–33)
MCHC RBC AUTO-ENTMCNC: 33.2 G/DL (ref 31.5–36.5)
MCV RBC AUTO: 89 FL (ref 78–100)
NONHDLC SERPL-MCNC: 106 MG/DL
PLATELET # BLD AUTO: 252 10E3/UL (ref 150–450)
POTASSIUM BLD-SCNC: 3.5 MMOL/L (ref 3.4–5.3)
RBC # BLD AUTO: 4.1 10E6/UL (ref 3.8–5.2)
SODIUM SERPL-SCNC: 141 MMOL/L (ref 133–144)
TRIGL SERPL-MCNC: 72 MG/DL
TSH SERPL DL<=0.005 MIU/L-ACNC: 1.15 MU/L (ref 0.4–4)
WBC # BLD AUTO: 3.8 10E3/UL (ref 4–11)

## 2023-01-06 PROCEDURE — 80048 BASIC METABOLIC PNL TOTAL CA: CPT

## 2023-01-06 PROCEDURE — 84443 ASSAY THYROID STIM HORMONE: CPT

## 2023-01-06 PROCEDURE — 85027 COMPLETE CBC AUTOMATED: CPT

## 2023-01-06 PROCEDURE — 82728 ASSAY OF FERRITIN: CPT

## 2023-01-06 PROCEDURE — 36415 COLL VENOUS BLD VENIPUNCTURE: CPT

## 2023-01-06 PROCEDURE — 80061 LIPID PANEL: CPT

## 2023-01-06 PROCEDURE — 83036 HEMOGLOBIN GLYCOSYLATED A1C: CPT

## 2023-01-07 ENCOUNTER — ANCILLARY PROCEDURE (OUTPATIENT)
Dept: MAMMOGRAPHY | Facility: CLINIC | Age: 58
End: 2023-01-07
Attending: OBSTETRICS & GYNECOLOGY
Payer: COMMERCIAL

## 2023-01-07 DIAGNOSIS — Z12.31 VISIT FOR SCREENING MAMMOGRAM: ICD-10-CM

## 2023-01-07 PROCEDURE — 77067 SCR MAMMO BI INCL CAD: CPT | Mod: TC | Performed by: RADIOLOGY

## 2023-01-07 PROCEDURE — 77063 BREAST TOMOSYNTHESIS BI: CPT | Mod: TC | Performed by: RADIOLOGY

## 2023-01-27 ENCOUNTER — OFFICE VISIT (OUTPATIENT)
Dept: FAMILY MEDICINE | Facility: CLINIC | Age: 58
End: 2023-01-27
Payer: COMMERCIAL

## 2023-01-27 VITALS
BODY MASS INDEX: 29.38 KG/M2 | HEART RATE: 75 BPM | DIASTOLIC BLOOD PRESSURE: 76 MMHG | OXYGEN SATURATION: 95 % | TEMPERATURE: 97.8 F | SYSTOLIC BLOOD PRESSURE: 142 MMHG | HEIGHT: 66 IN

## 2023-01-27 DIAGNOSIS — M72.0 DUPUYTREN'S CONTRACTURE OF RIGHT HAND: ICD-10-CM
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
## 2023-01-27 DIAGNOSIS — M25.562 POSTERIOR LEFT KNEE PAIN: ICD-10-CM

## 2023-01-27 DIAGNOSIS — F32.0 CURRENT MILD EPISODE OF MAJOR DEPRESSIVE DISORDER WITHOUT PRIOR EPISODE (H): ICD-10-CM

## 2023-01-27 DIAGNOSIS — R10.2 PELVIC PAIN IN FEMALE: Primary | ICD-10-CM

## 2023-01-27 DIAGNOSIS — Z12.11 SCREEN FOR COLON CANCER: ICD-10-CM

## 2023-01-27 DIAGNOSIS — S30.861S TICK BITE OF ABDOMEN, SEQUELA: ICD-10-CM

## 2023-01-27 DIAGNOSIS — E55.9 VITAMIN D DEFICIENCY: ICD-10-CM

## 2023-01-27 PROCEDURE — 99214 OFFICE O/P EST MOD 30 MIN: CPT | Performed by: PREVENTIVE MEDICINE

## 2023-01-27 PROCEDURE — 82306 VITAMIN D 25 HYDROXY: CPT | Performed by: PREVENTIVE MEDICINE

## 2023-01-27 PROCEDURE — 86618 LYME DISEASE ANTIBODY: CPT | Performed by: PREVENTIVE MEDICINE

## 2023-01-27 PROCEDURE — 36415 COLL VENOUS BLD VENIPUNCTURE: CPT | Performed by: PREVENTIVE MEDICINE

## 2023-01-27 ASSESSMENT — PATIENT HEALTH QUESTIONNAIRE - PHQ9
SUM OF ALL RESPONSES TO PHQ QUESTIONS 1-9: 3
10. IF YOU CHECKED OFF ANY PROBLEMS, HOW DIFFICULT HAVE THESE PROBLEMS MADE IT FOR YOU TO DO YOUR WORK, TAKE CARE OF THINGS AT HOME, OR GET ALONG WITH OTHER PEOPLE: NOT DIFFICULT AT ALL
SUM OF ALL RESPONSES TO PHQ QUESTIONS 1-9: 3

## 2023-01-27 ASSESSMENT — PAIN SCALES - GENERAL: PAINLEVEL: SEVERE PAIN (6)

## 2023-01-27 NOTE — PROGRESS NOTES
Assessment & Plan     Pelvic pain in female  -symptoms for several years  -has been seen by GYN  -has Ultrasound done 11/4/21:  IMPRESSION:  1. The left ovary contains an echogenic mass measuring 5 mm, not  conspicuous on prior ultrasounds. Differential includes dermoid.  Recommend ultrasound follow-up in 12 months.  2. In the left axilla, there is a tubular cystic structure measuring  3.3 cm, visualized since 2010 without significant change in size. Due  to the patient's history of prior tubal ligation, this may represent  dilated fallopian tube. GYN management is recommended.  3. Stable intramural fibroid.  4. Stable intrauterine myometrial cystic structure adjacent to  endometrium since 2015.    - US Pelvic Complete with Transvaginal    Tick bite of abdomen, sequela  -patient had a tick of the abdomen and would like testing for Lyme disease   - Lyme Disease Total Abs Bld with Reflex to Confirm CLIA    Vitamin D deficiency  -has had low levels in the past   - Vitamin D Deficiency    Current mild episode of major depressive disorder without prior episode (H)  -On Fluoxetine     Dupuytren's contracture of right hand  -explained that I cannot do a cortisone injection in clinic, she would need to see HAND, would like to defer for now     Posterior left knee pain  -discussed possible underlying osteoarthritis and how she may have had a Popliteal cyst  -symptoms resolved at this time     Screen for colon cancer  - Colonoscopy Screening  Referral      Ordering of each unique test  40 minutes spent on the date of the encounter doing chart review, history and exam, documentation and further activities per the note         Return in about 4 weeks (around 2/24/2023) if symptoms worsen or fail to improve.    Vandana Hernandez MD MPH    [facility]    Pop Neff is a 57 year old presenting for the following health issues:  Knee Pain and Groin Pain      History of Present Illness        Reason for visit:  Pain in left side groin and behind left knee Lymes info ganglien cyst on my right hnd and referral for colonoscopy  Symptom onset:  More than a month  Symptoms include:  Discomfort in left side groin and behind left knee and right hand  Symptom intensity:  Moderate  Symptom progression:  Staying the same  Had these symptoms before:  Yes  Has tried/received treatment for these symptoms:  Yes  Previous treatment was successful:  No  What makes it worse:  Being tired at end if day  What makes it better:  Sleep    She eats 2-3 servings of fruits and vegetables daily.She consumes 2 sweetened beverage(s) daily.She exercises with enough effort to increase her heart rate 30 to 60 minutes per day.  She exercises with enough effort to increase her heart rate 5 days per week.   She is taking medications regularly.    Today's PHQ-9         PHQ-9 Total Score: 3    PHQ-9 Q9 Thoughts of better off dead/self-harm past 2 weeks :   Not at all    How difficult have these problems made it for you to do your work, take care of things at home, or get along with other people: Not difficult at all       Left groin pain:  -present for years  -thought was her ovaries  -Had Pelvic US with GYN   -never goes away  -sometimes hurts more     IMPRESSION:  1. The left ovary contains an echogenic mass measuring 5 mm, not  conspicuous on prior ultrasounds. Differential includes dermoid.  Recommend ultrasound follow-up in 12 months.  2. In the left axilla, there is a tubular cystic structure measuring  3.3 cm, visualized since 2010 without significant change in size. Due  to the patient's history of prior tubal ligation, this may represent  dilated fallopian tube. GYN management is recommended.  3. Stable intramural fibroid.  4. Stable intrauterine myometrial cystic structure adjacent to  endometrium since 2015.    Pain behind the left knee:  -is on her knees  -hurst more when standing up from the  "ground  -intermittent+  -hurts more by the end of the day  -no edema  -no redness  -some stiffness  -clicking sounds+  -symptoms are getting better  -does take Ibuprofen as needed  -pain does not wake   -is wondering if the pain behind the knee is caused by Lyme's disease 10/22    Thickening of finger: tendon+ on the right hand, patient wondering if I can do a steroid shot in clinic today.     Sometimes heart beats very hard and she can see it beating.  Stress is better+  May feel it is beating faster  No exertional chest pain  Exercise has made things better  Last episode of symptoms 3-4 times per week  More at night, lasts a few seconds   Thought it may be due to anxiety , has been on Fluoxetine  At this time would like to defer further work up.   EKG normal in 2019 for this concern      Review of Systems   Constitutional, HEENT, cardiovascular, pulmonary, gi and gu systems are negative, except as otherwise noted.      Objective    BP (!) 142/76   Pulse 75   Temp 97.8  F (36.6  C) (Tympanic)   Ht 1.676 m (5' 6\")   LMP 02/24/2014 (LMP Unknown)   SpO2 95%   BMI 29.38 kg/m    Body mass index is 29.38 kg/m .  Physical Exam   GENERAL APPEARANCE: healthy, alert and no distress  EYES: Eyes grossly normal to inspection and conjunctivae and sclerae normal  RESP: lungs clear to auscultation - no rales, rhonchi or wheezes  CV: regular rates and rhythm, normal S1 S2, no S3 or S4 and no murmur, click or rub  ABDOMEN: soft, mild tenderness on deep palpation of the left inguinal region.   MS: extremities normal- no gross deformities noted and peripheral pulses normal  SKIN: no suspicious lesions or rashes  NEURO: Normal strength and tone, mentation intact and speech normal  PSYCH: mentation appears normal  Right hand: thickened tendon+   Left knee:  Inspection does not show any gross deformities.  No edema, no erythema, no skin changes, no rash.  No tibial tuberosity tenderness, no tenderness along medial, and lateral " joint lines.  No pes anserine bursitis.  No popliteal tenderness.  Full range of motion.  No quadriceps atrophy.  Strength is 5/5.  Neurovascular intact.  Anterior and posterior drawer signs are negative.  Varus and valgus stress negative.  Roger test negative.        No results found for this or any previous visit (from the past 24 hour(s)).

## 2023-01-30 LAB
B BURGDOR IGG+IGM SER QL: 0.04
DEPRECATED CALCIDIOL+CALCIFEROL SERPL-MC: 25 UG/L (ref 20–75)

## 2023-01-31 NOTE — RESULT ENCOUNTER NOTE
Aishwarya,    Vitamin D levels are normal.     Please do not hesitate to call us at [phone] if you have any questions or concerns.    Thank you,    Vandana Hernandez MD MPH

## 2023-02-05 ENCOUNTER — MYC MEDICAL ADVICE (OUTPATIENT)
Dept: FAMILY MEDICINE | Facility: CLINIC | Age: 58
End: 2023-02-05
Payer: COMMERCIAL

## 2023-02-05 DIAGNOSIS — G89.29 ABDOMINAL PAIN, CHRONIC, LEFT LOWER QUADRANT: Primary | ICD-10-CM

## 2023-02-05 DIAGNOSIS — R10.32 ABDOMINAL PAIN, CHRONIC, LEFT LOWER QUADRANT: Primary | ICD-10-CM

## 2023-02-07 NOTE — TELEPHONE ENCOUNTER
Order for colonoscopy was placed during the last visit.     Referral Details    Referred By  Referred To   Vandana Hernandez MD   [id number]   [facility] [address]   Phone: [phone]   Fax: [phone]    Diagnoses: Screen for colon cancer   Order: Colonoscopy Screening  Referral       Comment: Please be aware that coverage of these services is subject to the terms and limitations of your health insurance plan.  Call member services at your health plan with any benefit or coverage questions.   [facility] will call you to coordinate your care as prescribed by the provider.  If you don t hear from a representative within 2 business days, please call [phone].       Thank you,  Vandana Hernandez MD MPH

## 2023-02-08 ENCOUNTER — NURSE TRIAGE (OUTPATIENT)
Dept: FAMILY MEDICINE | Facility: CLINIC | Age: 58
End: 2023-02-08
Payer: COMMERCIAL

## 2023-02-08 NOTE — TELEPHONE ENCOUNTER
RN called and spoke with patient regarding below provider message. Pt verbalized understanding and agrees with plan. Pt will call and schedule the ultrasound.   No further questions/concerns.     Gabrielle Llanos RN    [facility]

## 2023-02-08 NOTE — TELEPHONE ENCOUNTER
Patient states that she has had LLQ abdominal for the past 2 years. She explain that the pain has been more constant for the past couple week. Patient has a HX of an Ovarian cyst and has an US order in chart from PCP. Patient denies N/V/D, fevers and further complaints. This pain does not interfere with normal activity.    Patient agrees that she will schedule her ultrasound and follow up sooner if she has any worsening symptoms. Patient verbalizes understanding.     Reason for Disposition    MILD pain (e.g., does not interfere with normal activities) and pain comes and goes (cramps) lasts > 48 hours  (Exception: This same abdominal pain is a chronic symptom recurrent or ongoing AND present > 4 weeks.)    Additional Information    Negative: Passed out (i.e., fainted, collapsed and was not responding)    Negative: Shock suspected (e.g., cold/pale/clammy skin, too weak to stand, low BP, rapid pulse)    Negative: Sounds like a life-threatening emergency to the triager    Negative: Chest pain    Negative: Pain is mainly in upper abdomen (if needed ask: 'is it mainly above the belly button?')    Negative: Abdominal pain and pregnant < 20 weeks    Negative: Abdominal pain and pregnant 20 or more weeks    Negative: SEVERE abdominal pain (e.g., excruciating)    Negative: Vomiting red blood or black (coffee ground) material    Negative: Bloody, black, or tarry bowel movements  (Exception: Chronic-unchanged black-grey bowel movements and is taking iron pills or Pepto-Bismol.)    Negative: Constant abdominal pain lasting > 2 hours    Negative: Vomiting bile (green color)    Negative: Patient sounds very sick or weak to the triager    Negative: Vomiting and abdomen looks much more swollen than usual    Negative: White of the eyes have turned yellow (i.e., jaundice)    Negative: Blood in urine (red, pink, or tea-colored)    Negative: Fever > 103 F (39.4 C)    Negative: Fever > 101 F (38.3 C) and over 60 years of age    Negative:  "Fever > 100.0 F (37.8 C) and has diabetes mellitus or a weak immune system (e.g., HIV positive, cancer chemotherapy, organ transplant, splenectomy, chronic steroids)    Negative: Fever > 100.0 F (37.8 C) and bedridden (e.g., nursing home patient, stroke, chronic illness, recovering from surgery)    Negative: Pregnant or could be pregnant (i.e., missed last menstrual period)    Negative: MODERATE pain (e.g., interferes with normal activities that comes and goes (cramps) lasts > 24 hours  (Exception: Pain with Vomiting or Diarrhea - see that Protocol.)    Negative: Unusual vaginal discharge    Negative: Age > 60 years    Negative: Patient wants to be seen    Answer Assessment - Initial Assessment Questions  1. LOCATION: \"Where does it hurt?\"       LLQ HX of Ovarian cyst   2. RADIATION: \"Does the pain shoot anywhere else?\" (e.g., chest, back)      Left groin that radiates down her left leg   3. ONSET: \"When did the pain begin?\" (e.g., minutes, hours or days ago)       Patient has had this pain for the past 2 years. Patient reports that the pain has been more constant.   4. SUDDEN: \"Gradual or sudden onset?\"      Gradual   5. PATTERN \"Does the pain come and go, or is it constant?\" Constant       6. SEVERITY: \"How bad is the pain?\"  (e.g., Scale 1-10; mild, moderate, or severe)    - MILD (1-3): doesn't interfere with normal activities, abdomen soft and not tender to touch           7. RECURRENT SYMPTOM: \"Have you ever had this type of stomach pain before?\" If Yes, ask: \"When was the last time?\" and \"What happened that time?\"       *No   8. CAUSE: \"What do you think is causing the stomach pain?\"      \"I have no idea\"   9. RELIEVING/AGGRAVATING FACTORS: \"What makes it better or worse?\" (e.g., movement, antacids, bowel movement)      Last BM today (normal)   10. OTHER SYMPTOMS: \"Do you have any other symptoms?\" (e.g., back pain, diarrhea, fever, urination pain, vomiting)   Denies    Protocols used: ABDOMINAL PAIN - " FEMALE-A-OH    Becky HANNAH RN, BSN   [facility]

## 2023-02-09 ENCOUNTER — ANCILLARY PROCEDURE (OUTPATIENT)
Dept: ULTRASOUND IMAGING | Facility: CLINIC | Age: 58
End: 2023-02-09
Attending: PREVENTIVE MEDICINE
Payer: COMMERCIAL

## 2023-02-09 DIAGNOSIS — R10.2 PELVIC PAIN IN FEMALE: ICD-10-CM

## 2023-02-09 PROCEDURE — 76856 US EXAM PELVIC COMPLETE: CPT | Mod: TC | Performed by: RADIOLOGY

## 2023-02-09 PROCEDURE — 76830 TRANSVAGINAL US NON-OB: CPT | Mod: TC | Performed by: RADIOLOGY

## 2023-02-10 NOTE — RESULT ENCOUNTER NOTE
Aishwarya,     Pelvic Ultrasound is showing stable findings.  No new or concerning changes noted.     Please do not hesitate to call us at [phone] if you have any questions or concerns.    Thank you,    Vandana Hernandez MD MPH

## 2023-02-16 ENCOUNTER — ANCILLARY PROCEDURE (OUTPATIENT)
Dept: CT IMAGING | Facility: CLINIC | Age: 58
End: 2023-02-16
Attending: PREVENTIVE MEDICINE
Payer: COMMERCIAL

## 2023-02-16 DIAGNOSIS — R10.32 ABDOMINAL PAIN, CHRONIC, LEFT LOWER QUADRANT: ICD-10-CM

## 2023-02-16 DIAGNOSIS — G89.29 ABDOMINAL PAIN, CHRONIC, LEFT LOWER QUADRANT: ICD-10-CM

## 2023-02-16 PROCEDURE — 74177 CT ABD & PELVIS W/CONTRAST: CPT | Mod: TC | Performed by: RADIOLOGY

## 2023-02-16 RX ORDER — IOPAMIDOL 755 MG/ML
100 INJECTION, SOLUTION INTRAVASCULAR ONCE
Status: COMPLETED | OUTPATIENT
Start: 2023-02-16 | End: 2023-02-16

## 2023-02-16 RX ADMIN — IOPAMIDOL 100 ML: 755 INJECTION, SOLUTION INTRAVASCULAR at 18:39

## 2023-02-16 NOTE — TELEPHONE ENCOUNTER
Orders for CT scan placed. Patient can call [phone] to schedule. I would still recommend doing the colonoscopy.  Thank you,  Vandana Hernandez MD MPH

## 2023-02-17 NOTE — RESULT ENCOUNTER NOTE
Aishwarya, your test results were within normal limits.  CT scan is normal, no acute changes seen.  Mild arthritis in the low back seen.    Please do not hesitate to call us at [phone] if you have any questions or concerns.    Thank you,    Vandana Hernandez MD MPH

## 2023-02-25 DIAGNOSIS — R10.13 DYSPEPSIA: ICD-10-CM

## 2023-03-22 DIAGNOSIS — F41.1 GAD (GENERALIZED ANXIETY DISORDER): ICD-10-CM

## 2023-04-03 ENCOUNTER — TELEPHONE (OUTPATIENT)
Dept: GASTROENTEROLOGY | Facility: CLINIC | Age: 58
End: 2023-04-03
Payer: COMMERCIAL

## 2023-04-03 ENCOUNTER — HOSPITAL ENCOUNTER (OUTPATIENT)
Facility: AMBULATORY SURGERY CENTER | Age: 58
End: 2023-04-03
Attending: FAMILY MEDICINE | Admitting: FAMILY MEDICINE
Payer: COMMERCIAL

## 2023-04-03 NOTE — TELEPHONE ENCOUNTER
Screening Questions  BLUE  KIND OF PREP RED  LOCATION [review exclusion criteria] GREEN  SEDATION TYPE        Y Are you active on mychart?       HECTOR DU Ordering/Referring Provider?        [employer] What type of coverage do you have?      N Have you had a positive covid test in the last 14 days?     29.4 1. BMI  [BMI 40+ - review exclusion criteria]    Y  2. Are you able to give consent for your medical care? [IF NO,RN REVIEW]          N  3. Are you taking any prescription pain medications on a routine schedule   (ex narcotics: oxycodone, roxicodone, oxycontin,  and percocet)? [RN Review]          3a. EXTENDED PREP What kind of prescription?     N 4. Do you have any chemical dependencies such as alcohol, street drugs, or methadone?        **If yes 3- 5 , please schedule with MAC sedation.**          IF YES TO ANY 6 - 10 - HOSPITAL SETTING ONLY.     N 6.   Do you need assistance transferring?     N 7.   Have you had a heart or lung transplant?    N 8.   Are you currently on dialysis?   N 9.   Do you use daily home oxygen?   N 10. Do you take nitroglycerin?   10a.  If yes, how often?     11. [FEMALES]   Are you currently pregnant?    11a.  If yes, how many weeks? [ Greater than 12 weeks, OR NEEDED]    N 12. Do you have Pulmonary Hypertension? *NEED PAC APPT AT [facility] w/ MAC*     N 13. [review exclusion criteria]  Do you have any implantable devices in your body (pacemaker, defib, LVAD)?    N 14. In the past 6 months, have you had any heart related issues including cardiomyopathy or heart attack?     14a.  If yes, did it require cardiac stenting if so when?     N 15. Have you had a stroke or Transient ischemic attack (TIA - aka  mini stroke ) within 6 months?      NO PER PT 16. Do you have mod to severe Obstructive Sleep Apnea?  [Hospital only]    N 17. Do you have SEVERE AND UNCONTROLLED asthma? *NEED PAC APPT AT [facility] w/MAC*     18. Are you currently taking any blood thinners?     18a. No. Continue to 19.   18b.  "     19. Do you take the medication Phentermine?    19a. If yes, \"Hold for 7 days before procedure.  Please consult your prescribing provider if you have questions about holding this medication.\"     N  20. Do you have chronic kidney disease?      N  21. Do you have a diagnosis of diabetes?     N  22. On a regular basis do you go 3-5 days between bowel movements?      23. Preferred LOCAL Pharmacy for Pre Prescription    [ LIST ONLY ONE PHARMACY]     [facility] [address] [facility] - [place] , [place] - [address]        - CLOSING REMINDERS -    Informed patient they will need an adult    Cannot take any type of public or medical transportation alone    Conscious Sedation- Needs  for 6 hours after the procedure       MAC/General-Needs  for 24 hours after procedure    Pre-Procedure Covid test to be completed [[facility] PCR Testing Required]    Confirmed Nurse will call to complete assessment       - SCHEDULING DETAILS -  N Hospital Setting Required? If yes, what is the exclusion?:    DAVI  Surgeon    6/2  Date of Procedure  Lower Endoscopy [Colonoscopy]  Type of Procedure Scheduled  [facility] [place]ALAX GATORADE WITHOUT MAGNEISUM CITRATE Which Colonoscopy Prep was Sent?     CS Sedation Type     N PAC / Pre-op Required                 "

## 2023-04-04 ENCOUNTER — TELEPHONE (OUTPATIENT)
Dept: GASTROENTEROLOGY | Facility: CLINIC | Age: 58
End: 2023-04-04
Payer: COMMERCIAL

## 2023-04-04 NOTE — TELEPHONE ENCOUNTER
Caller: Aishwarya Savage  Reason for Reschedule/Cancellation (please be detailed, any staff messages or encounters to note?): pt think it is too soon to do and is not rescheduling yet      Prior to reschedule please review:    Ordering Provider:freda zacarias    Sedation per order:moderate    Does patient have any ASC Exclusions, please identify?: n      Notes on Cancelled Procedure:    Procedure:Lower Endoscopy [Colonoscopy]     Date: 6/2/23    Location:[facility]; [address], [place] [address]    Surgeon: Emmy        Rescheduled: No

## 2023-04-23 ENCOUNTER — HEALTH MAINTENANCE LETTER (OUTPATIENT)
Age: 58
End: 2023-04-23

## 2023-05-19 ENCOUNTER — APPOINTMENT (OUTPATIENT)
Dept: OPTOMETRY | Facility: CLINIC | Age: 58
End: 2023-05-19
Payer: COMMERCIAL

## 2023-05-19 PROCEDURE — 92341 FIT SPECTACLES BIFOCAL: CPT | Performed by: OPTOMETRIST

## 2023-07-13 ENCOUNTER — MYC REFILL (OUTPATIENT)
Dept: FAMILY MEDICINE | Facility: CLINIC | Age: 58
End: 2023-07-13
Payer: COMMERCIAL

## 2023-07-13 DIAGNOSIS — R10.13 DYSPEPSIA: ICD-10-CM

## 2023-08-30 ENCOUNTER — VIRTUAL VISIT (OUTPATIENT)
Dept: FAMILY MEDICINE | Facility: CLINIC | Age: 58
End: 2023-08-30
Payer: COMMERCIAL

## 2023-08-30 DIAGNOSIS — J40 BRONCHITIS: Primary | ICD-10-CM

## 2023-08-30 DIAGNOSIS — J01.00 ACUTE NON-RECURRENT MAXILLARY SINUSITIS: ICD-10-CM

## 2023-08-30 DIAGNOSIS — B37.31 CANDIDIASIS OF VAGINA: ICD-10-CM

## 2023-08-30 PROCEDURE — 99442 PR PHYSICIAN TELEPHONE EVALUATION 11-20 MIN: CPT | Mod: 95 | Performed by: INTERNAL MEDICINE

## 2023-08-30 RX ORDER — PREDNISONE 20 MG/1
TABLET ORAL
Qty: 20 TABLET | Refills: 0 | Status: SHIPPED | OUTPATIENT
Start: 2023-08-30 | End: 2024-02-13

## 2023-08-30 RX ORDER — FLUCONAZOLE 150 MG/1
150 TABLET ORAL
Qty: 3 TABLET | Refills: 0 | Status: SHIPPED | OUTPATIENT
Start: 2023-08-30 | End: 2023-09-06

## 2023-08-30 ASSESSMENT — PATIENT HEALTH QUESTIONNAIRE - PHQ9
SUM OF ALL RESPONSES TO PHQ QUESTIONS 1-9: 2
SUM OF ALL RESPONSES TO PHQ QUESTIONS 1-9: 2
10. IF YOU CHECKED OFF ANY PROBLEMS, HOW DIFFICULT HAVE THESE PROBLEMS MADE IT FOR YOU TO DO YOUR WORK, TAKE CARE OF THINGS AT HOME, OR GET ALONG WITH OTHER PEOPLE: NOT DIFFICULT AT ALL

## 2023-08-30 ASSESSMENT — ENCOUNTER SYMPTOMS: COUGH: 1

## 2023-08-30 NOTE — PROGRESS NOTES
Aishwarya is a 58 year old who is being evaluated via a billable telephone visit.      What phone number would you like to be contacted at?     [phone]      How would you like to obtain your AVS? Marva    Distant Location (provider location):  Off-site    Assessment & Plan     Bronchitis  She initially started having some rhinorrhea and sore throat like symptoms  These was around 3 to 4 weeks ago  Then she started having cough  She is bringing up some phlegm now  This is green in color  Also has been wheezing   Does get short of breath when she coughs  No fever  No exertional chest pain  She was given some inhalers in the past for chest congestion and inhaler has been helping her  Cough is worse at night  No other exacerbating factors for cough  I suspect she has got bronchitis and bronchial hypersensitivity from the infection that she had recently  Since symptoms have been ongoing for the last 3 weeks and she is bringing up some green phlegm I think it is not unreasonable to start some antibiotics  - predniSONE (DELTASONE) 20 MG tablet; Take 3 tabs by mouth daily x 3 days, then 2 tabs daily x 3 days, then 1 tab daily x 3 days, then 1/2 tab daily x 3 days.    Acute non-recurrent maxillary sinusitis  She also has some postnasal drip and feels that she has some sinusitis like symptoms  Augmentin will also cover this  Advised about stimulation  - amoxicillin-clavulanate (AUGMENTIN) 875-125 MG tablet; Take 1 tablet by mouth 2 times daily    Candidiasis of vagina  She gets candidiasis of the vagina whenever she takes antibiotics  - fluconazole (DIFLUCAN) 150 MG tablet; Take 1 tablet (150 mg) by mouth every 3 days for 3 doses      25 minutes spent by me on the date of the encounter doing chart review, history and exam, documentation and further activities per the note           Terrell Herbert MD  [facility]   Aishwarya is a 58 year old, presenting for the [facility] health  issues:  Cough      8/30/2023     9:55 AM   Additional Questions   Roomed by Melinda       History of Present Illness       Reason for visit:  Cough  Symptom onset:  3-4 weeks ago  Symptoms include:  A cough that is deep in my chest. Slight difficulty breathing, heavy chest.  Symptom intensity:  Moderate  Symptom progression:  Worsening  Had these symptoms before:  Yes  Has tried/received treatment for these symptoms:  Yes  Previous treatment was successful:  Yes  Prior treatment description:  Antibiotic  What makes it worse:  Not taking 1200 mg guaifinisen 2 times daily and 2 puffs of albuterol at night.  What makes it better:  I exhale as hard as I can multiple times into a towel and it loosens up a little phlegm that i can cough up but then I feel.like a chaffing feeling deep in my lungs.    She eats 2-3 servings of fruits and vegetables daily.She consumes 1 sweetened beverage(s) daily.She exercises with enough effort to increase her heart rate 30 to 60 minutes per day.  She exercises with enough effort to increase her heart rate 5 days per week.   She is taking medications regularly.               Review of Systems   Respiratory:  Positive for cough.             Objective           Vitals:  No vitals were obtained today due to virtual visit.    Physical Exam   healthy, alert, and no distress  PSYCH: Alert and oriented times 3; coherent speech, normal   rate and volume, able to articulate logical thoughts, able   to abstract reason, no tangential thoughts, no hallucinations   or delusions  Her affect is normal  RESP: No cough, no audible wheezing, able to talk in full sentences  Remainder of exam unable to be completed due to telephone visits                Phone call duration: 15 minutes

## 2023-10-11 DIAGNOSIS — E03.4 HYPOTHYROIDISM DUE TO ACQUIRED ATROPHY OF THYROID: ICD-10-CM

## 2023-10-11 DIAGNOSIS — F41.1 GAD (GENERALIZED ANXIETY DISORDER): ICD-10-CM

## 2023-10-11 DIAGNOSIS — R10.13 DYSPEPSIA: ICD-10-CM

## 2023-10-12 RX ORDER — LEVOTHYROXINE SODIUM 150 UG/1
150 TABLET ORAL DAILY
Qty: 90 TABLET | Refills: 0 | Status: SHIPPED | OUTPATIENT
Start: 2023-10-12 | End: 2024-01-15

## 2024-01-15 ENCOUNTER — MYC REFILL (OUTPATIENT)
Dept: FAMILY MEDICINE | Facility: CLINIC | Age: 59
End: 2024-01-15
Payer: COMMERCIAL

## 2024-01-15 ENCOUNTER — ANCILLARY ORDERS (OUTPATIENT)
Dept: FAMILY MEDICINE | Facility: CLINIC | Age: 59
End: 2024-01-15

## 2024-01-15 DIAGNOSIS — R10.13 DYSPEPSIA: ICD-10-CM

## 2024-01-15 DIAGNOSIS — F41.1 GAD (GENERALIZED ANXIETY DISORDER): ICD-10-CM

## 2024-01-15 DIAGNOSIS — Z12.31 VISIT FOR SCREENING MAMMOGRAM: Primary | ICD-10-CM

## 2024-01-15 DIAGNOSIS — E03.4 HYPOTHYROIDISM DUE TO ACQUIRED ATROPHY OF THYROID: ICD-10-CM

## 2024-01-15 RX ORDER — LEVOTHYROXINE SODIUM 150 UG/1
150 TABLET ORAL DAILY
Qty: 30 TABLET | Refills: 1 | Status: SHIPPED | OUTPATIENT
Start: 2024-01-15 | End: 2024-02-13

## 2024-02-13 ENCOUNTER — OFFICE VISIT (OUTPATIENT)
Dept: FAMILY MEDICINE | Facility: CLINIC | Age: 59
End: 2024-02-13
Payer: COMMERCIAL

## 2024-02-13 VITALS
BODY MASS INDEX: 32.1 KG/M2 | HEART RATE: 81 BPM | OXYGEN SATURATION: 95 % | SYSTOLIC BLOOD PRESSURE: 139 MMHG | DIASTOLIC BLOOD PRESSURE: 85 MMHG | WEIGHT: 188 LBS | HEIGHT: 64 IN | TEMPERATURE: 97.3 F | RESPIRATION RATE: 16 BRPM

## 2024-02-13 DIAGNOSIS — F32.0 CURRENT MILD EPISODE OF MAJOR DEPRESSIVE DISORDER WITHOUT PRIOR EPISODE (H): ICD-10-CM

## 2024-02-13 DIAGNOSIS — Z13.220 LIPID SCREENING: ICD-10-CM

## 2024-02-13 DIAGNOSIS — B00.9 HSV (HERPES SIMPLEX VIRUS) INFECTION: ICD-10-CM

## 2024-02-13 DIAGNOSIS — Z00.00 ROUTINE HISTORY AND PHYSICAL EXAMINATION OF ADULT: Primary | ICD-10-CM

## 2024-02-13 DIAGNOSIS — Z12.4 SCREENING FOR CERVICAL CANCER: ICD-10-CM

## 2024-02-13 DIAGNOSIS — R73.09 ABNORMAL GLUCOSE: ICD-10-CM

## 2024-02-13 DIAGNOSIS — Z12.11 COLON CANCER SCREENING: ICD-10-CM

## 2024-02-13 DIAGNOSIS — M54.12 CERVICAL RADICULOPATHY: ICD-10-CM

## 2024-02-13 DIAGNOSIS — F41.1 GAD (GENERALIZED ANXIETY DISORDER): ICD-10-CM

## 2024-02-13 DIAGNOSIS — E03.4 HYPOTHYROIDISM DUE TO ACQUIRED ATROPHY OF THYROID: ICD-10-CM

## 2024-02-13 DIAGNOSIS — R10.13 DYSPEPSIA: ICD-10-CM

## 2024-02-13 LAB
HBA1C MFR BLD: 5.7 % (ref 0–5.6)
HGB BLD-MCNC: 12.5 G/DL (ref 11.7–15.7)

## 2024-02-13 PROCEDURE — 80053 COMPREHEN METABOLIC PANEL: CPT | Performed by: PREVENTIVE MEDICINE

## 2024-02-13 PROCEDURE — 36415 COLL VENOUS BLD VENIPUNCTURE: CPT | Performed by: PREVENTIVE MEDICINE

## 2024-02-13 PROCEDURE — G0145 SCR C/V CYTO,THINLAYER,RESCR: HCPCS | Performed by: PREVENTIVE MEDICINE

## 2024-02-13 PROCEDURE — 84439 ASSAY OF FREE THYROXINE: CPT | Performed by: PREVENTIVE MEDICINE

## 2024-02-13 PROCEDURE — 99396 PREV VISIT EST AGE 40-64: CPT | Performed by: PREVENTIVE MEDICINE

## 2024-02-13 PROCEDURE — 87624 HPV HI-RISK TYP POOLED RSLT: CPT | Performed by: PREVENTIVE MEDICINE

## 2024-02-13 PROCEDURE — 80061 LIPID PANEL: CPT | Performed by: PREVENTIVE MEDICINE

## 2024-02-13 PROCEDURE — 99214 OFFICE O/P EST MOD 30 MIN: CPT | Mod: 25 | Performed by: PREVENTIVE MEDICINE

## 2024-02-13 PROCEDURE — 85018 HEMOGLOBIN: CPT | Performed by: PREVENTIVE MEDICINE

## 2024-02-13 PROCEDURE — 84443 ASSAY THYROID STIM HORMONE: CPT | Performed by: PREVENTIVE MEDICINE

## 2024-02-13 PROCEDURE — 83036 HEMOGLOBIN GLYCOSYLATED A1C: CPT | Performed by: PREVENTIVE MEDICINE

## 2024-02-13 RX ORDER — LEVOTHYROXINE SODIUM 150 UG/1
150 TABLET ORAL DAILY
Qty: 90 TABLET | Refills: 3 | Status: SHIPPED | OUTPATIENT
Start: 2024-02-13

## 2024-02-13 RX ORDER — ACYCLOVIR 50 MG/G
OINTMENT TOPICAL
Qty: 15 G | Refills: 1 | Status: SHIPPED | OUTPATIENT
Start: 2024-02-13

## 2024-02-13 SDOH — HEALTH STABILITY: PHYSICAL HEALTH: ON AVERAGE, HOW MANY MINUTES DO YOU ENGAGE IN EXERCISE AT THIS LEVEL?: 20 MIN

## 2024-02-13 SDOH — HEALTH STABILITY: PHYSICAL HEALTH: ON AVERAGE, HOW MANY DAYS PER WEEK DO YOU ENGAGE IN MODERATE TO STRENUOUS EXERCISE (LIKE A BRISK WALK)?: 5 DAYS

## 2024-02-13 ASSESSMENT — SOCIAL DETERMINANTS OF HEALTH (SDOH): HOW OFTEN DO YOU GET TOGETHER WITH FRIENDS OR RELATIVES?: ONCE A WEEK

## 2024-02-13 ASSESSMENT — PATIENT HEALTH QUESTIONNAIRE - PHQ9
10. IF YOU CHECKED OFF ANY PROBLEMS, HOW DIFFICULT HAVE THESE PROBLEMS MADE IT FOR YOU TO DO YOUR WORK, TAKE CARE OF THINGS AT HOME, OR GET ALONG WITH OTHER PEOPLE: NOT DIFFICULT AT ALL
SUM OF ALL RESPONSES TO PHQ QUESTIONS 1-9: 6
SUM OF ALL RESPONSES TO PHQ QUESTIONS 1-9: 6

## 2024-02-13 NOTE — PROGRESS NOTES
Preventive Care Visit  [facility] ROBERT Hernandez MD, Family Medicine  Feb 13, 2024    Assessment & Plan     Routine history and physical examination of adult  -preventive guidelines reviewed     Hypothyroidism due to acquired atrophy of thyroid  -refills on medication provided   - Comprehensive metabolic panel  - T4 free  - TSH  - levothyroxine (SYNTHROID/LEVOTHROID) 150 MCG tablet  Dispense: 90 tablet; Refill: 3    TSH   Date Value Ref Range Status   01/06/2023 1.15 0.40 - 4.00 mU/L Final   07/05/2021 0.57 0.40 - 4.00 mU/L Final       Cervical radiculopathy  -MRI done 4/22, results reviewed   -I did discuss with the patient that if she has increased pain again we can refer her to pain management for an injection   - Comprehensive metabolic panel  - Hemoglobin    Colon cancer screening  -screening guidelines reviewed   - Colonoscopy Screening  Referral    HSV (herpes simplex virus) infection  -good results with topical medication, has not used oral anti virals   - acyclovir (ZOVIRAX) 5 % external ointment  Dispense: 15 g; Refill: 1    HARLEY (generalized anxiety disorder)  -refills on medication provided   - Comprehensive metabolic panel  - FLUoxetine (PROZAC) 20 MG capsule  Dispense: 90 capsule; Refill: 1  - Comprehensive metabolic panel    Dyspepsia  - Comprehensive metabolic panel  - omeprazole (PRILOSEC) 20 MG DR capsule  Dispense: 90 capsule; Refill: 3  - Comprehensive metabolic panel    Lipid screening  - Lipid panel reflex to direct LDL Non-fasting    Abnormal glucose  - Hemoglobin A1c    Lab Results   Component Value Date    A1C 5.7 02/13/2024    A1C 5.7 01/06/2023         Screening for cervical cancer  -screening guidelines reviewed   - Pap screen with HPV - recommended age 30 - 65 years    Current mild episode of major depressive disorder without prior episode (H24)  -stable on Fluoxetine               BMI  Estimated body mass index is 32.02 kg/m  as calculated from the  "following:    Height as of this encounter: 1.632 m (5' 4.25\").    Weight as of this encounter: 85.3 kg (188 lb).       Counseling  Appropriate preventive services were discussed with this patient, including applicable screening as appropriate for fall prevention, nutrition, physical activity, Tobacco-use cessation, weight loss and cognition.  Checklist reviewing preventive services available has been given to the patient.  Reviewed patient's diet, addressing concerns and/or questions.   The patient was instructed to see the dentist every 6 months.   The patient's PHQ-9 score is consistent with mild depression. She was provided with information regarding depression.             Pop Neff is a 58 year old, presenting for the following:  Physical        2/13/2024     1:14 PM   Additional Questions   Roomed by Katey HANNAH   Accompanied by self        Via the Health Maintenance questionnaire, the patient has reported the following services have been completed -Eye Exam, this information has been sent to the abstraction team.  Health Care Directive  Patient does not have a Health Care Directive or Living Will: Patient states has Advance Directive and will bring in a copy to clinic.    HPI    History of cold sores:  Needs refill on Acyclovir, used for cold sores as needed    Thyroid:  -Taking medication as prescribed  -no side effects of medication  -no heat or cold intolerance  -no bowel changes     Mood:  -stable  -doing well with medication  -no medication side effects  -no thoughts of self harm    Cervical radiculopathy:  -MRI done  -better with oral Prednisone          2/13/2024   General Health   How would you rate your overall physical health? Good   Feel stress (tense, anxious, or unable to sleep) To some extent   (!) STRESS CONCERN      2/13/2024   Nutrition   Three or more servings of calcium each day? Yes   Diet: Regular (no restrictions)   How many servings of fruit and vegetables per day? (!) 2-3   How " many sweetened beverages each day? (!) 2         2/13/2024   Exercise   Days per week of moderate/strenous exercise 5 days   Average minutes spent exercising at this level 20 min         2/13/2024   Social Factors   Frequency of gathering with friends or relatives Once a week   Worry food won't last until get money to buy more No   Food not last or not have enough money for food? No   Do you have housing?  Yes   Are you worried about losing your housing? No   Lack of transportation? No   Unable to get utilities (heat,electricity)? No         2/13/2024   Fall Risk   Fallen 2 or more times in the past year? No   Trouble with walking or balance? No          2/13/2024   Dental   Dentist two times every year? (!) NO         2/13/2024   TB Screening   Were you born outside of [place]?  No       Today's PHQ-9 Score:       2/13/2024     1:04 PM   PHQ-9 SCORE   PHQ-9 Total Score MyChart 6 (Mild depression)   PHQ-9 Total Score 6         2/13/2024   Substance Use   Alcohol more than 3/day or more than 7/wk Not Applicable   Do you use any other substances recreationally? No     Social History     Tobacco Use    Smoking status: Never    Smokeless tobacco: Never   Vaping Use    Vaping Use: Never used   Substance Use Topics    Alcohol use: Yes     Alcohol/week: 2.0 standard drinks of alcohol     Types: 2 Standard drinks or equivalent per week     Comment: Socially    Drug use: No             2/13/2024   Breast Cancer Screening   Family history of breast, colon, or ovarian cancer? Yes         2/13/2024   LAST FHS-7 RESULTS   1st degree relative breast or ovarian cancer No   Any relative bilateral breast cancer No   Any male have breast cancer No   Any woman have breast and ovarian cancer No   Any woman with breast cancer before 50yrs Yes   2 or more relatives with breast and/or ovarian cancer No   2 or more relatives with breast and/or bowel cancer No        Mammogram Screening - Mammogram every 1-2 years updated in Health Maintenance  based on mutual decision making        2/13/2024   STI Screening   New sexual partner(s) since last STI/HIV test? No     History of abnormal Pap smear: NO - age 30-65 PAP every 5 years with negative HPV co-testing recommended        Latest Ref Rng & Units 12/16/2019     6:41 PM 12/16/2019     7:00 AM 12/23/2014    12:00 AM   PAP / HPV   PAP (Historical)  NIL   NIL    HPV 16 DNA NEG^Negative  Negative     HPV 18 DNA NEG^Negative  Negative     Other HR HPV NEG^Negative  Negative       The 10-year ASCVD risk score (Galindo PAYNE, et al., 2019) is: 3.1%    Values used to calculate the score:      Age: 58 years      Sex: Female      Is Non- : No      Diabetic: No      Tobacco smoker: No      Systolic Blood Pressure: 139 mmHg      Is BP treated: No      HDL Cholesterol: 64 mg/dL      Total Cholesterol: 170 mg/dL           Reviewed and updated as needed this visit by Provider                    Past Medical History:   Diagnosis Date    Allergies     HSV (herpes simplex virus) infection     Oral    Neuroma, Kimani's      Past Surgical History:   Procedure Laterality Date    CL AFF SURGICAL PATHOLOGY  01/01/2001    COLONOSCOPY  02/21/2014    Procedure: COLONOSCOPY;  Colonoscopy, screening;  Surgeon: Micheal Webb MD;  Location: [facility] OR     TOOTH EXTRACTION W/FORCEP      OPEN REDUCTION INTERNAL FIXATION ANKLE Left 07/12/2019    Procedure: Internal Fixation Left Ankle Medial Lateral Malleolus;  Surgeon: Tin Guzman MD;  Location: [facility] OR    TUBAL LIGATION  01/01/1991    ZZHC EXCISE HAND/FOOT NEUROMA  2009, 2010    Rt Foot     Lab work is in process  Labs reviewed in EPIC  BP Readings from Last 3 Encounters:   02/13/24 139/85   01/27/23 (!) 142/76   07/05/21 131/87    Wt Readings from Last 3 Encounters:   02/13/24 85.3 kg (188 lb)   07/05/21 82.6 kg (182 lb)   07/12/19 81.6 kg (180 lb)                  Patient Active Problem List   Diagnosis    Hypothyroidism    HSV (herpes simplex virus)  infection    Depression    Glaucoma suspect    HARLEY (generalized anxiety disorder)    CHEYANNE (obstructive sleep apnea)- mild (AHI 6)    Fibromyalgia    Hyperlipidemia LDL goal <160    Situational mixed anxiety and depressive disorder    Acute seasonal allergic rhinitis due to pollen    Current mild episode of major depressive disorder without prior episode (H24)    Closed displaced bimalleolar fracture of left lower leg with routine healing     Past Surgical History:   Procedure Laterality Date    CL AFF SURGICAL PATHOLOGY  01/01/2001    COLONOSCOPY  02/21/2014    Procedure: COLONOSCOPY;  Colonoscopy, screening;  Surgeon: Micheal Webb MD;  Location: [facility] OR    HC TOOTH EXTRACTION W/FORCEP      OPEN REDUCTION INTERNAL FIXATION ANKLE Left 07/12/2019    Procedure: Internal Fixation Left Ankle Medial Lateral Malleolus;  Surgeon: Tin Guzman MD;  Location: [facility] OR    TUBAL LIGATION  01/01/1991    ZZHC EXCISE HAND/FOOT NEUROMA  2009, 2010    Rt Foot       Social History     Tobacco Use    Smoking status: Never    Smokeless tobacco: Never   Substance Use Topics    Alcohol use: Yes     Alcohol/week: 2.0 standard drinks of alcohol     Types: 2 Standard drinks or equivalent per week     Comment: Socially     Family History   Problem Relation Age of Onset    Cancer Maternal Grandmother         lung    Cancer Mother         lung    Blood Disease Mother         nonhodgkins lymphoma    Thyroid Disease Mother         hypothyroid    Diabetes Mother         Type 2    Diabetes Father         2    Hypertension Father     C.A.D. Father         ? MI at 69    Cardiovascular Paternal Grandfather         AAA    Breast Cancer Other     Glaucoma Paternal Grandmother     Diabetes Sister         diabetic    Cerebrovascular Disease Maternal Aunt     Macular Degeneration No family hx of          Current Outpatient Medications   Medication Sig Dispense Refill    acyclovir (ZOVIRAX) 5 % external ointment Apply topically 6 times daily 15 g  "1    albuterol (PROAIR HFA/PROVENTIL HFA/VENTOLIN HFA) 108 (90 Base) MCG/ACT inhaler INHALE 1-2 PUFFS INTO THE LUNGS EVERY 6 HRS AS NEEDED FOR SHORTNESS OF BREATH/DYSPNEA/WHEEZING 18 g 3    FLUoxetine (PROZAC) 20 MG capsule TAKE 1 CAPSULE BY MOUTH EVERY DAY 90 capsule 1    IBUPROFEN 200 MG OR TABS 1 TABLET EVERY 4 TO 6 HOURS AS NEEDED      levothyroxine (SYNTHROID/LEVOTHROID) 150 MCG tablet Take 1 tablet (150 mcg) by mouth daily 90 tablet 3    Multiple Vitamins-Minerals (MULTIVITAMIN ADULT PO) Take 1 tablet by mouth daily      omeprazole (PRILOSEC) 20 MG DR capsule TAKE 1 CAPSULE BY MOUTH EVERY DAY. 90 capsule 3    traZODone (DESYREL) 50 MG tablet Take 1 tablet (50 mg) by mouth At Bedtime 30 tablet 3    cetirizine (ZYRTEC) 10 MG tablet TAKE 1 TABLET BY MOUTH EVERY DAY (Patient not taking: Reported on 2/13/2024) 60 tablet 3     Allergies   Allergen Reactions    Clindamycin Swelling     face    Sulfa Antibiotics Hives         Review of Systems  Constitutional, HEENT, cardiovascular, pulmonary, gi and gu systems are negative, except as otherwise noted.     Objective    Exam  /85 (BP Location: Left arm, Patient Position: Sitting, Cuff Size: Adult Large)   Pulse 81   Temp 97.3  F (36.3  C) (Oral)   Resp 16   Ht 1.632 m (5' 4.25\")   Wt 85.3 kg (188 lb)   LMP 02/24/2014 (LMP Unknown)   SpO2 95%   BMI 32.02 kg/m     Estimated body mass index is 32.02 kg/m  as calculated from the following:    Height as of this encounter: 1.632 m (5' 4.25\").    Weight as of this encounter: 85.3 kg (188 lb).    Physical Exam  GENERAL: alert and no distress  EYES: Eyes grossly normal to inspection, PERRL and conjunctivae and sclerae normal  NECK: no adenopathy, no asymmetry, masses, or scars  RESP: lungs clear to auscultation - no rales, rhonchi or wheezes  CV: regular rate and rhythm, normal S1 S2, no S3 or S4, no murmur, click or rub, no peripheral edema  ABDOMEN: soft, nontender, no rebound or guarding    (female) " w/bimanual: normal female external genitalia, normal urethral meatus, normal vaginal mucosa, and normal cervix without masses or discharge  MS: no gross musculoskeletal defects noted, no edema  SKIN: no suspicious lesions or rashes  NEURO: Normal strength and tone, mentation intact and speech normal  PSYCH: mentation appears normal, affect normal/bright      Signed Electronically by: Vandana Hernandez MD MPH

## 2024-02-14 LAB
ALBUMIN SERPL BCG-MCNC: 4.6 G/DL (ref 3.5–5.2)
ALP SERPL-CCNC: 91 U/L (ref 40–150)
ALT SERPL W P-5'-P-CCNC: 26 U/L (ref 0–50)
ANION GAP SERPL CALCULATED.3IONS-SCNC: 12 MMOL/L (ref 7–15)
AST SERPL W P-5'-P-CCNC: 25 U/L (ref 0–45)
BILIRUB SERPL-MCNC: 0.3 MG/DL
BUN SERPL-MCNC: 12.1 MG/DL (ref 8–23)
CALCIUM SERPL-MCNC: 10.1 MG/DL (ref 8.8–10.2)
CHLORIDE SERPL-SCNC: 103 MMOL/L (ref 98–107)
CHOLEST SERPL-MCNC: 166 MG/DL
CREAT SERPL-MCNC: 0.91 MG/DL (ref 0.51–0.95)
DEPRECATED HCO3 PLAS-SCNC: 25 MMOL/L (ref 22–29)
EGFRCR SERPLBLD CKD-EPI 2021: 72 ML/MIN/1.73M2
FASTING STATUS PATIENT QL REPORTED: NO
GLUCOSE SERPL-MCNC: 83 MG/DL (ref 70–99)
HDLC SERPL-MCNC: 61 MG/DL
LDLC SERPL CALC-MCNC: 84 MG/DL
NONHDLC SERPL-MCNC: 105 MG/DL
POTASSIUM SERPL-SCNC: 3.9 MMOL/L (ref 3.4–5.3)
PROT SERPL-MCNC: 6.9 G/DL (ref 6.4–8.3)
SODIUM SERPL-SCNC: 140 MMOL/L (ref 135–145)
T4 FREE SERPL-MCNC: 1.01 NG/DL (ref 0.9–1.7)
TRIGL SERPL-MCNC: 105 MG/DL
TSH SERPL DL<=0.005 MIU/L-ACNC: 2.62 UIU/ML (ref 0.3–4.2)

## 2024-02-14 NOTE — RESULT ENCOUNTER NOTE
Aishwarya,     Three month glucose number is showing pre diabetes.  Prediabetes means your blood sugar is high and you are at increased risk for developing overt diabetes in the future.   Be sure to monitor your intake of things like bread, pasta, rice, starchy foods (ie: potatoes), sugary beverages (ie: soda, juice-even the natural kind) and alcohol.  I recommend your plate be 1/2 vegetables, 1/4 protein, and 1/4 carbohydrate.      Cholesterol is at goal for you.  Thyroid labs are normal, continue current dose of medication.  Electrolytes, glucose, kidney function, and liver function tests are normal.  Hemoglobin is normal, you are not anemic.       Please do not hesitate to call us at [phone] if you have any questions or concerns.    Thank you,    Vandana Hernandez MD MPH

## 2024-02-15 LAB
BKR LAB AP GYN ADEQUACY: NORMAL
BKR LAB AP GYN INTERPRETATION: NORMAL
BKR LAB AP HPV REFLEX: NORMAL
BKR LAB AP PREVIOUS ABNORMAL: NORMAL
PATH REPORT.COMMENTS IMP SPEC: NORMAL
PATH REPORT.COMMENTS IMP SPEC: NORMAL
PATH REPORT.RELEVANT HX SPEC: NORMAL

## 2024-02-19 LAB
HUMAN PAPILLOMA VIRUS 16 DNA: NEGATIVE
HUMAN PAPILLOMA VIRUS 18 DNA: NEGATIVE
HUMAN PAPILLOMA VIRUS FINAL DIAGNOSIS: NORMAL
HUMAN PAPILLOMA VIRUS OTHER HR: NEGATIVE

## 2024-02-29 ENCOUNTER — OFFICE VISIT (OUTPATIENT)
Dept: URGENT CARE | Facility: URGENT CARE | Age: 59
End: 2024-02-29
Payer: COMMERCIAL

## 2024-02-29 ENCOUNTER — ANCILLARY PROCEDURE (OUTPATIENT)
Dept: GENERAL RADIOLOGY | Facility: CLINIC | Age: 59
End: 2024-02-29
Attending: PHYSICIAN ASSISTANT
Payer: COMMERCIAL

## 2024-02-29 VITALS
TEMPERATURE: 97.4 F | BODY MASS INDEX: 31.8 KG/M2 | RESPIRATION RATE: 16 BRPM | WEIGHT: 186.7 LBS | HEART RATE: 74 BPM | OXYGEN SATURATION: 98 % | SYSTOLIC BLOOD PRESSURE: 159 MMHG | DIASTOLIC BLOOD PRESSURE: 90 MMHG

## 2024-02-29 DIAGNOSIS — M79.671 RIGHT FOOT PAIN: Primary | ICD-10-CM

## 2024-02-29 DIAGNOSIS — M79.671 RIGHT FOOT PAIN: ICD-10-CM

## 2024-02-29 PROCEDURE — 99214 OFFICE O/P EST MOD 30 MIN: CPT | Performed by: PHYSICIAN ASSISTANT

## 2024-02-29 PROCEDURE — 73630 X-RAY EXAM OF FOOT: CPT | Mod: TC | Performed by: RADIOLOGY

## 2024-02-29 ASSESSMENT — ENCOUNTER SYMPTOMS
ALLERGIC/IMMUNOLOGIC NEGATIVE: 1
CHILLS: 0
NECK STIFFNESS: 0
ARTHRALGIAS: 1
COUGH: 0
HEADACHES: 0
PALPITATIONS: 0
EYES NEGATIVE: 1
VOMITING: 0
ENDOCRINE NEGATIVE: 1
BACK PAIN: 0
DIZZINESS: 0
DIARRHEA: 0
WOUND: 0
CARDIOVASCULAR NEGATIVE: 1
RHINORRHEA: 0
JOINT SWELLING: 0
FEVER: 0
NAUSEA: 0
WEAKNESS: 0
MYALGIAS: 0
SORE THROAT: 0
NECK PAIN: 0
SHORTNESS OF BREATH: 0
RESPIRATORY NEGATIVE: 1
LIGHT-HEADEDNESS: 0

## 2024-03-01 NOTE — PROGRESS NOTES
Chief Complaint:     Chief Complaint   Patient presents with    Urgent Care    Foot Pain     Hurt foot (right) on the 10th of Feb and it's not better         ASSESSMENT     1. Right foot pain         PLAN    XR of the right foot was negative for any acute fractures per my read.  RICE discussed  Recommended rest and avoidance of activities which cause pain or swelling.  Pain relief: Acetaminophen and or Ibuprofen with food.  Patient verbalized understanding, and agrees with this plan.    32 minutes was spent in the care of this patient including chart review, HPI, ROS, PE, review of plan, and placing of orders.      HPI: Aishwarya Savage is an 58 year old female who presents today for evaluation of R foot injury injury.  Onset of the injury was 3 week ago. She states that she was moving a trailer with her  when the trailer rolled over right foot. She noted that she had bruising at the right 4th and 5th digits. The bruising has resolved. She now describes her pain to be located mainly at the lateral aspect of her foot and describes it as a burning sharp pain. States that the pain is worth with walking on it which she has been doing since injuring it. She has tried ibuprofen and icing with some relief.     Patient denies any numbness, tingling, or dysfunction of the right foot.    ROS:      Review of Systems   Constitutional:  Negative for chills and fever.   HENT:  Negative for congestion, ear pain, rhinorrhea and sore throat.    Eyes: Negative.    Respiratory: Negative.  Negative for cough and shortness of breath.    Cardiovascular: Negative.  Negative for chest pain and palpitations.   Gastrointestinal:  Negative for diarrhea, nausea and vomiting.   Endocrine: Negative.    Genitourinary: Negative.    Musculoskeletal:  Positive for arthralgias. Negative for back pain, joint swelling, myalgias, neck pain and neck stiffness.   Skin: Negative.  Negative for rash and wound.   Allergic/Immunologic: Negative.   Negative for immunocompromised state.   Neurological:  Negative for dizziness, weakness, light-headedness and headaches.        Problem history  Patient Active Problem List   Diagnosis    Hypothyroidism    HSV (herpes simplex virus) infection    Depression    Glaucoma suspect    HARLEY (generalized anxiety disorder)    CHEYANNE (obstructive sleep apnea)- mild (AHI 6)    Fibromyalgia    Hyperlipidemia LDL goal <160    Situational mixed anxiety and depressive disorder    Acute seasonal allergic rhinitis due to pollen    Current mild episode of major depressive disorder without prior episode (H24)    Closed displaced bimalleolar fracture of left lower leg with routine healing        Allergies  Allergies   Allergen Reactions    Clindamycin Swelling     face    Seasonal Allergies     Sulfa Antibiotics Hives        Smoking History  History   Smoking Status    Never   Smokeless Tobacco    Never        Current Meds    Current Outpatient Medications:     cetirizine (ZYRTEC) 10 MG tablet, TAKE 1 TABLET BY MOUTH EVERY DAY, Disp: 60 tablet, Rfl: 3    FLUoxetine (PROZAC) 20 MG capsule, TAKE 1 CAPSULE BY MOUTH EVERY DAY, Disp: 90 capsule, Rfl: 1    IBUPROFEN 200 MG OR TABS, 1 TABLET EVERY 4 TO 6 HOURS AS NEEDED, Disp: , Rfl:     levothyroxine (SYNTHROID/LEVOTHROID) 150 MCG tablet, Take 1 tablet (150 mcg) by mouth daily, Disp: 90 tablet, Rfl: 3    Multiple Vitamins-Minerals (MULTIVITAMIN ADULT PO), Take 1 tablet by mouth daily, Disp: , Rfl:     omeprazole (PRILOSEC) 20 MG DR capsule, TAKE 1 CAPSULE BY MOUTH EVERY DAY., Disp: 90 capsule, Rfl: 3    acyclovir (ZOVIRAX) 5 % external ointment, Apply topically 6 times daily (Patient not taking: Reported on 2/29/2024), Disp: 15 g, Rfl: 1    albuterol (PROAIR HFA/PROVENTIL HFA/VENTOLIN HFA) 108 (90 Base) MCG/ACT inhaler, INHALE 1-2 PUFFS INTO THE LUNGS EVERY 6 HRS AS NEEDED FOR SHORTNESS OF BREATH/DYSPNEA/WHEEZING (Patient not taking: Reported on 2/29/2024), Disp: 18 g, Rfl: 3    traZODone  (DESYREL) 50 MG tablet, Take 1 tablet (50 mg) by mouth At Bedtime (Patient not taking: Reported on 2/29/2024), Disp: 30 tablet, Rfl: 3        Vital signs reviewed by Andrez Madrigal PA-C  BP (!) 159/90 (BP Location: Left arm, Patient Position: Sitting, Cuff Size: Adult Regular)   Pulse 74   Temp 97.4  F (36.3  C) (Tympanic)   Resp 16   Wt 84.7 kg (186 lb 11.2 oz)   LMP 02/24/2014 (LMP Unknown)   SpO2 98%   BMI 31.80 kg/m      Physical Exam     Physical Exam  Vitals and nursing note reviewed.   Constitutional:       General: She is not in acute distress.     Appearance: She is well-developed. She is not ill-appearing, toxic-appearing or diaphoretic.   HENT:      Head: Normocephalic and atraumatic.      Right Ear: Tympanic membrane and external ear normal. No drainage, swelling or tenderness. Tympanic membrane is not perforated, erythematous, retracted or bulging.      Left Ear: Tympanic membrane and external ear normal. No drainage, swelling or tenderness. Tympanic membrane is not perforated, erythematous, retracted or bulging.      Nose: No mucosal edema, congestion or rhinorrhea.      Right Sinus: No maxillary sinus tenderness or frontal sinus tenderness.      Left Sinus: No maxillary sinus tenderness or frontal sinus tenderness.      Mouth/Throat:      Pharynx: No pharyngeal swelling, oropharyngeal exudate, posterior oropharyngeal erythema or uvula swelling.      Tonsils: No tonsillar abscesses.   Eyes:      Pupils: Pupils are equal, round, and reactive to light.   Neck:      Trachea: Trachea normal.   Cardiovascular:      Rate and Rhythm: Normal rate and regular rhythm.      Pulses:           Dorsalis pedis pulses are 2+ on the right side.        Posterior tibial pulses are 2+ on the right side.      Heart sounds: Normal heart sounds, S1 normal and S2 normal. No murmur heard.     No friction rub. No gallop.   Pulmonary:      Effort: Pulmonary effort is normal. No respiratory distress.      Breath sounds:  Normal breath sounds. No decreased breath sounds, wheezing, rhonchi or rales.   Abdominal:      General: Bowel sounds are normal. There is no distension.      Palpations: Abdomen is soft. Abdomen is not rigid. There is no mass.      Tenderness: There is no abdominal tenderness. There is no guarding or rebound.   Musculoskeletal:      Cervical back: Full passive range of motion without pain, normal range of motion and neck supple.      Right foot: Normal range of motion. No deformity.        Feet:    Feet:      Comments: Tenderness to palpation of the lateral aspect of right foot  Lymphadenopathy:      Cervical: No cervical adenopathy.   Skin:     General: Skin is warm and dry.   Neurological:      Mental Status: She is alert and oriented to person, place, and time.      Cranial Nerves: No cranial nerve deficit.      Deep Tendon Reflexes: Reflexes are normal and symmetric.   Psychiatric:         Behavior: Behavior normal. Behavior is cooperative.         Thought Content: Thought content normal.         Judgment: Judgment normal.             Andrez Madrigal PA-C  2/29/2024, 6:07 PM

## 2024-03-02 ENCOUNTER — ANCILLARY PROCEDURE (OUTPATIENT)
Dept: MAMMOGRAPHY | Facility: CLINIC | Age: 59
End: 2024-03-02
Attending: PREVENTIVE MEDICINE
Payer: COMMERCIAL

## 2024-03-02 DIAGNOSIS — Z12.31 VISIT FOR SCREENING MAMMOGRAM: ICD-10-CM

## 2024-03-02 PROCEDURE — 77063 BREAST TOMOSYNTHESIS BI: CPT | Mod: TC | Performed by: RADIOLOGY

## 2024-03-02 PROCEDURE — 77067 SCR MAMMO BI INCL CAD: CPT | Mod: TC | Performed by: RADIOLOGY

## 2024-05-24 DIAGNOSIS — R10.13 DYSPEPSIA: ICD-10-CM

## 2024-05-29 ENCOUNTER — MYC REFILL (OUTPATIENT)
Dept: FAMILY MEDICINE | Facility: CLINIC | Age: 59
End: 2024-05-29
Payer: COMMERCIAL

## 2024-05-29 DIAGNOSIS — R10.13 DYSPEPSIA: ICD-10-CM

## 2024-08-19 DIAGNOSIS — F41.1 GAD (GENERALIZED ANXIETY DISORDER): ICD-10-CM

## 2024-09-17 NOTE — TELEPHONE ENCOUNTER
REASON FOR VISIT: Left hip pain    DATE OF APPT: 9/19/2024   NOTES (FOR ALL VISITS) STATUS DETAILS   OFFICE NOTE from referring provider Internal Self   MEDICATION LIST Internal    IMAGING  (FOR ALL VISITS)     XR N/A    MRI (HEAD, NECK, SPINE) N/A    CT (HEAD, NECK, SPINE) N/A

## 2024-09-19 ENCOUNTER — PRE VISIT (OUTPATIENT)
Dept: ORTHOPEDICS | Facility: CLINIC | Age: 59
End: 2024-09-19

## 2024-09-19 ENCOUNTER — ANCILLARY PROCEDURE (OUTPATIENT)
Dept: GENERAL RADIOLOGY | Facility: CLINIC | Age: 59
End: 2024-09-19
Attending: FAMILY MEDICINE
Payer: COMMERCIAL

## 2024-09-19 ENCOUNTER — OFFICE VISIT (OUTPATIENT)
Dept: ORTHOPEDICS | Facility: CLINIC | Age: 59
End: 2024-09-19
Payer: COMMERCIAL

## 2024-09-19 DIAGNOSIS — M25.552 LEFT HIP PAIN: Primary | ICD-10-CM

## 2024-09-19 DIAGNOSIS — M25.552 LEFT HIP PAIN: ICD-10-CM

## 2024-09-19 PROCEDURE — 73502 X-RAY EXAM HIP UNI 2-3 VIEWS: CPT | Mod: LT | Performed by: RADIOLOGY

## 2024-09-19 PROCEDURE — 99204 OFFICE O/P NEW MOD 45 MIN: CPT | Performed by: FAMILY MEDICINE

## 2024-09-19 RX ORDER — PREDNISONE 20 MG/1
40 TABLET ORAL DAILY
Qty: 10 TABLET | Refills: 0 | Status: SHIPPED | OUTPATIENT
Start: 2024-09-19 | End: 2024-09-24

## 2024-09-19 ASSESSMENT — PAIN SCALES - GENERAL: PAINLEVEL: SEVERE PAIN (6)

## 2024-09-19 NOTE — LETTER
9/19/2024      Aishwarya Savage  [address] [facility] [id number]      Dear Colleague,    Thank you for referring your patient, Aishwarya Savage, to the [facility] [place]. Please see a copy of my visit note below.    CHIEF COMPLAINT:  Pain and New Patient of the Left Hip (Left hip pain for about a year)       HISTORY OF PRESENT ILLNESS  Ms. Savage is a 59 year old female who presents to clinic today with left hip pain.  Aishwarya has been experiencing pain in her left hip for the past year or so.  She points to the lateral aspect of her hip, this is tender to the touch and does bother her in the morning, this will get somewhat better as the day goes on but then get worse towards the end of the day.  She also has back pain with radicular features, this feels somewhat different to her but is difficult to discern.        Additional history: as documented    MEDICAL HISTORY  Patient Active Problem List   Diagnosis     Hypothyroidism     HSV (herpes simplex virus) infection     Depression     Glaucoma suspect     HARLEY (generalized anxiety disorder)     CHEYANNE (obstructive sleep apnea)- mild (AHI 6)     Fibromyalgia     Hyperlipidemia LDL goal <160     Situational mixed anxiety and depressive disorder     Acute seasonal allergic rhinitis due to pollen     Current mild episode of major depressive disorder without prior episode (H24)     Closed displaced bimalleolar fracture of left lower leg with routine healing       Current Outpatient Medications   Medication Sig Dispense Refill     cetirizine (ZYRTEC) 10 MG tablet TAKE 1 TABLET BY MOUTH EVERY DAY 60 tablet 3     FLUoxetine (PROZAC) 20 MG capsule TAKE 1 CAPSULE BY MOUTH EVERY DAY 90 capsule 1     IBUPROFEN 200 MG OR TABS 1 TABLET EVERY 4 TO 6 HOURS AS NEEDED       levothyroxine (SYNTHROID/LEVOTHROID) 150 MCG tablet Take 1 tablet (150 mcg) by mouth daily 90 tablet 3     Multiple Vitamins-Minerals (MULTIVITAMIN ADULT PO) Take 1  tablet by mouth daily       omeprazole (PRILOSEC) 20 MG DR capsule TAKE 1 CAPSULE BY MOUTH EVERY DAY. 90 capsule 1     predniSONE (DELTASONE) 20 MG tablet Take 2 tablets (40 mg) by mouth daily for 5 days. 10 tablet 0     acyclovir (ZOVIRAX) 5 % external ointment Apply topically 6 times daily (Patient not taking: Reported on 2/29/2024) 15 g 1     albuterol (PROAIR HFA/PROVENTIL HFA/VENTOLIN HFA) 108 (90 Base) MCG/ACT inhaler INHALE 1-2 PUFFS INTO THE LUNGS EVERY 6 HRS AS NEEDED FOR SHORTNESS OF BREATH/DYSPNEA/WHEEZING (Patient not taking: Reported on 2/29/2024) 18 g 3     traZODone (DESYREL) 50 MG tablet Take 1 tablet (50 mg) by mouth At Bedtime (Patient not taking: Reported on 2/29/2024) 30 tablet 3       Allergies   Allergen Reactions     Clindamycin Swelling     face     Seasonal Allergies      Sulfa Antibiotics Hives       Family History   Problem Relation Age of Onset     Cancer Maternal Grandmother         lung     Cancer Mother         lung     Blood Disease Mother         nonhodgkins lymphoma     Thyroid Disease Mother         hypothyroid     Diabetes Mother         Type 2     Diabetes Father         2     Hypertension Father      C.A.D. Father         ? MI at 69     Cardiovascular Paternal Grandfather         AAA     Breast Cancer Other      Glaucoma Paternal Grandmother      Diabetes Sister         diabetic     Cerebrovascular Disease Maternal Aunt      Macular Degeneration No family hx of        Additional medical/Social/Surgical histories reviewed in [facility] and updated as appropriate.        PHYSICAL EXAM  General  - normal appearance, in no obvious distress  Musculoskeletal - left hip  - stance: normal gait without limp  - inspection: no swelling or effusion, normal bone and joint alignment, no obvious deformity  - palpation: tender over the greater trochanter  - ROM: pain with active abduction, normal and painless flexion, extension, IR, ER  - strength: 5/5 in all planes  - special tests:  (-) KENNY  (-)  JILL  Neuro  - no sensory or motor deficit, grossly normal coordination, normal muscle tone               ASSESSMENT & PLAN  Ms. Savage is a 59 year old female who presents to clinic today with left hip pain.    I ordered and independently reviewed an xray of her pelvis and left hip, this reveals mild osteoarthritic change and some enthesopathic change at the greater trochanter.    Her symptoms are consistent with greater trochanteric pain syndrome.  We discussed the pathophysiology of this, we also discussed rehab exercises.  I did demonstrate some exercises in the room as well.    For symptoms I did prescribe her prednisone, she is going to use this and engage in her home exercise plan.    If her symptoms are not improving whatsoever over the course of the next 4 to 6 weeks I would likely recommend either a trochanteric injection or a return to clinic to discuss advanced imaging.    It was a pleasure seeing Aishwarya today.    Judah Santillan DO, CAM  Primary Care Sports Medicine      This note was constructed using Dragon dictation software, please excuse any minor errors in spelling, grammar, or syntax.    Again, thank you for allowing me to participate in the care of your patient.        Sincerely,        Judah Santillan DO

## 2024-09-19 NOTE — NURSING NOTE
Chief Complaint   Patient presents with    Left Hip - Pain, New Patient     Left hip pain for about a year       There were no vitals filed for this visit.    There is no height or weight on file to calculate BMI.      TOOTIE Bah NREMT

## 2024-09-20 NOTE — PROGRESS NOTES
CHIEF COMPLAINT:  Pain and New Patient of the Left Hip (Left hip pain for about a year)       HISTORY OF PRESENT ILLNESS  Ms. Savage is a 59 year old female who presents to clinic today with left hip pain.  Aishwarya has been experiencing pain in her left hip for the past year or so.  She points to the lateral aspect of her hip, this is tender to the touch and does bother her in the morning, this will get somewhat better as the day goes on but then get worse towards the end of the day.  She also has back pain with radicular features, this feels somewhat different to her but is difficult to discern.        Additional history: as documented    MEDICAL HISTORY  Patient Active Problem List   Diagnosis    Hypothyroidism    HSV (herpes simplex virus) infection    Depression    Glaucoma suspect    HARLEY (generalized anxiety disorder)    CHEYANNE (obstructive sleep apnea)- mild (AHI 6)    Fibromyalgia    Hyperlipidemia LDL goal <160    Situational mixed anxiety and depressive disorder    Acute seasonal allergic rhinitis due to pollen    Current mild episode of major depressive disorder without prior episode (H24)    Closed displaced bimalleolar fracture of left lower leg with routine healing       Current Outpatient Medications   Medication Sig Dispense Refill    cetirizine (ZYRTEC) 10 MG tablet TAKE 1 TABLET BY MOUTH EVERY DAY 60 tablet 3    FLUoxetine (PROZAC) 20 MG capsule TAKE 1 CAPSULE BY MOUTH EVERY DAY 90 capsule 1    IBUPROFEN 200 MG OR TABS 1 TABLET EVERY 4 TO 6 HOURS AS NEEDED      levothyroxine (SYNTHROID/LEVOTHROID) 150 MCG tablet Take 1 tablet (150 mcg) by mouth daily 90 tablet 3    Multiple Vitamins-Minerals (MULTIVITAMIN ADULT PO) Take 1 tablet by mouth daily      omeprazole (PRILOSEC) 20 MG DR capsule TAKE 1 CAPSULE BY MOUTH EVERY DAY. 90 capsule 1    predniSONE (DELTASONE) 20 MG tablet Take 2 tablets (40 mg) by mouth daily for 5 days. 10 tablet 0    acyclovir (ZOVIRAX) 5 % external ointment Apply topically 6 times  daily (Patient not taking: Reported on 2/29/2024) 15 g 1    albuterol (PROAIR HFA/PROVENTIL HFA/VENTOLIN HFA) 108 (90 Base) MCG/ACT inhaler INHALE 1-2 PUFFS INTO THE LUNGS EVERY 6 HRS AS NEEDED FOR SHORTNESS OF BREATH/DYSPNEA/WHEEZING (Patient not taking: Reported on 2/29/2024) 18 g 3    traZODone (DESYREL) 50 MG tablet Take 1 tablet (50 mg) by mouth At Bedtime (Patient not taking: Reported on 2/29/2024) 30 tablet 3       Allergies   Allergen Reactions    Clindamycin Swelling     face    Seasonal Allergies     Sulfa Antibiotics Hives       Family History   Problem Relation Age of Onset    Cancer Maternal Grandmother         lung    Cancer Mother         lung    Blood Disease Mother         nonhodgkins lymphoma    Thyroid Disease Mother         hypothyroid    Diabetes Mother         Type 2    Diabetes Father         2    Hypertension Father     C.A.D. Father         ? MI at 69    Cardiovascular Paternal Grandfather         AAA    Breast Cancer Other     Glaucoma Paternal Grandmother     Diabetes Sister         diabetic    Cerebrovascular Disease Maternal Aunt     Macular Degeneration No family hx of        Additional medical/Social/Surgical histories reviewed in [facility] and updated as appropriate.        PHYSICAL EXAM  General  - normal appearance, in no obvious distress  Musculoskeletal - left hip  - stance: normal gait without limp  - inspection: no swelling or effusion, normal bone and joint alignment, no obvious deformity  - palpation: tender over the greater trochanter  - ROM: pain with active abduction, normal and painless flexion, extension, IR, ER  - strength: 5/5 in all planes  - special tests:  (-) KENNY  (-) FADIR  Neuro  - no sensory or motor deficit, grossly normal coordination, normal muscle tone               ASSESSMENT & PLAN  Ms. Savage is a 59 year old female who presents to clinic today with left hip pain.    I ordered and independently reviewed an xray of her pelvis and left hip, this reveals mild  osteoarthritic change and some enthesopathic change at the greater trochanter.    Her symptoms are consistent with greater trochanteric pain syndrome.  We discussed the pathophysiology of this, we also discussed rehab exercises.  I did demonstrate some exercises in the room as well.    For symptoms I did prescribe her prednisone, she is going to use this and engage in her home exercise plan.    If her symptoms are not improving whatsoever over the course of the next 4 to 6 weeks I would likely recommend either a trochanteric injection or a return to clinic to discuss advanced imaging.    It was a pleasure seeing Aishwarya today.    Judah Santillan DO, [facility]  Primary Care Sports Medicine      This note was constructed using Dragon dictation software, please excuse any minor errors in spelling, grammar, or syntax.

## 2024-11-08 DIAGNOSIS — R10.13 DYSPEPSIA: ICD-10-CM

## 2024-11-27 NOTE — PROGRESS NOTES
Aishwarya Savage  :  1965  DOS: 2024  MRN: [id number]  PCP: Vandana Hernandez    Sports Medicine Clinic Visit    HPI  Aishwarya Savage is a 59 year old female who is seen as a self referral presenting with left ankle pain    - Mechanism of Injury:    - No inciting injury  - Pertinent history and prior evaluations:    - None for this new left foot pain. She does a significant history of surgical fixation of the ankle due to a bimalleolar fracture in 2019.    - Pain Character:    - Location: Anterolateral ankle at the tibiotalar joint and ATFL  - Character:  Intermittent sharp, aching. Waxes and wanes with intensity  - Duration:  Started about 2-3 months ago  - Course:  getting worse  - Endorses:    - swelling, numbness, tingling numbness and tingling in the toes occasionally in digits 3-5 when the swelling and pain comes on near the ATFL.  Primarily concerned if her hardware might be loose.  - Denies:    - clicking/popping, mechanical locking symptoms, instability  - Alleviating factors:    -  Nothing  major  - Aggravating factors:    - pivoting, walking, morning, stretching  - Other treatments tried:    - stretching, massage, changing gait    - Patient Goals:    - understand the cause of the symptoms, be able to manage the symptoms successfully, discuss treatment options  - Social History:   - Employed:  Childcare      Review of Systems  Musculoskeletal: as above  Remainder of review of systems is negative including constitutional, CV, pulmonary, GI, Skin and Neurologic except as noted in HPI or medical history.    Past Medical History:   Diagnosis Date    Allergies     HSV (herpes simplex virus) infection     Oral    Neuroma, Harman's      Past Surgical History:   Procedure Laterality Date    CL AFF SURGICAL PATHOLOGY  2001    COLONOSCOPY  2014    Procedure: COLONOSCOPY;  Colonoscopy, screening;  Surgeon: Micheal Webb MD;  Location: [facility] OR    HC TOOTH EXTRACTION W/FORCEP      OPEN REDUCTION  INTERNAL FIXATION ANKLE Left 07/12/2019    Procedure: Internal Fixation Left Ankle Medial Lateral Malleolus;  Surgeon: Tin Guzman MD;  Location: [facility] OR    TUBAL LIGATION  01/01/1991    ZZHC EXCISE HAND/FOOT NEUROMA  2009, 2010    Rt Foot     Family History   Problem Relation Age of Onset    Cancer Maternal Grandmother         lung    Cancer Mother         lung    Blood Disease Mother         nonhodgkins lymphoma    Thyroid Disease Mother         hypothyroid    Diabetes Mother         Type 2    Diabetes Father         2    Hypertension Father     C.A.D. Father         ? MI at 69    Cardiovascular Paternal Grandfather         AAA    Breast Cancer Other     Glaucoma Paternal Grandmother     Diabetes Sister         diabetic    Cerebrovascular Disease Maternal Aunt     Macular Degeneration No family hx of          Objective  LMP 02/24/2014 (LMP Unknown)     General: healthy, alert and in no acute distress.    HEENT: no scleral icterus or conjunctival erythema.   Skin: no suspicious lesions or rash. No jaundice.   CV: regular rhythm by palpation, 2+ distal pulses.  Resp: normal respiratory effort without conversational dyspnea.   Psych: normal mood and affect.    Gait:  slightly antalgic favoring the left ankle with a compensated gait, appropriate coordination and balance.    Neuro:       - Slightly diminished sensation of the dorsal lateral foot down to digits 3-5.  Slight worsening numbness with palpation over the distal anterior fibula approximately 3 cm above the joint    MSK - Ankle/Foot:       - Inspection:    -Baseline anterior, medial, lateral ankle swelling since surgery is present today.  No erythema, ecchymosis, lesion.        - ROM:    - Full AROM/PROM with some discomfort during dorsiflexion and eversion.       - Palpation:    - TTP at the ATFL, anterolateral ankle joint, over a bony spur at the anterolateral talus.   - No warmth  - NTTP elsewhere including over her fixation hardware screws and  plate, lateral malleolus.        - Strength:  (*antalgic)   - Dorsiflexion  5   - Plantarflexion 5   - Ext. Misael. Longus 5   - Inversion  5   - Eversion  5        - Special tests:        - Anterior drawer:  Neg   - Talar tilt:  Neg   - Syndesmotic squeeze: Neg   - Kleiger:  Neg   - Soniya:  Neg   - Martin:  Neg   - Metatarsal squeeze:  Neg      Radiology  I independently reviewed the available relevant imaging in the chart, with my interpretations as above in HPI.    I independently reviewed today's new relevant imaging, with the following interpretation:  - XR L ankle 12/7/2024 does show moderate posttraumatic osteoarthritis of the tibiotalar joint.  No acute fractures or dislocations.  No complication or loosening of her hardware.      Procedure  Medium Joint Injection/Arthrocentesis: L ankle    Date/Time: 12/7/2024 10:38 AM    Performed by: Hernesto Stevens DO  Authorized by: Hernesto Stevens DO    Indications:  Pain  Needle Size:  25 G  Guidance: ultrasound    Location:  Ankle  Site:  L ankle  Medications:  20 mg triamcinolone 40 MG/ML  Outcome:  Tolerated well, no immediate complications  Procedure discussed: discussed risks, benefits, and alternatives    Consent Given by:  Patient  Timeout: timeout called immediately prior to procedure    Prep: patient was prepped and draped in usual sterile fashion      Ultrasound-guided intra-articular ankle joint corticosteroid injection - Left  The risks of the procedure were explained to the patient.  A consent was signed for the intra-articular ankle injection.  The patient was evaluated with a [employer] ultrasound machine using a 12 MHz linear probe.     By palpation and ultrasound guidance, the tibiotalar joint space was identified medial to the tibialis anterior tendon and long axis and marked. Injection site was prepared with chlorhexidine solution in sterile fashion.  A 1.5 inch 25 gauge needle was used to enter the ankle joint via anterior approach under ultrasound guidance  and long axis in-plane.  A mixture of 1ml 1% lidocaine, 1ml of 0.5% bupivacaine, and 1ml of kenalog (40mg/ml) was injected without difficulty.  After removal of the needle, the area was cleaned, hemostasis achieved, and bandage applied. There was ultrasound documentation of needle placement and injection.  Patient tolerated the procedure well, no complications.      Assessment  1. Post-traumatic osteoarthritis of left ankle        Plan  Aishwarya Savage is a pleasant 59 year old female that presents with acute on chronic left ankle pain.  She feels pain in the anterolateral ankle that feels like an achy soreness that can get severe at times.  It can be painful after activities or after rest, nighttime pain.  She has a significant history of ORIF in 2019 after a bimalleolar fracture.  Surgery was successful and she does not feel major instability of the ankle.  Strength and range of motion are intact.  She also has some associated numbness/tingling in the dorsal lateral foot down to digits 3-5 intermittently, often will feel it when the ankle swells.  Her radiographs do reveal significant posttraumatic osteoarthritis in the tibiotalar joint and no major disruption of her hardware. History and physical exam appear most consistent with posttraumatic osteoarthritis of the left ankle.     We discussed the nature of the condition and available treatment options, and mutually agreed upon the following plan:    - Imaging:          - Reviewed and independently interpreted the relevant imaging in the chart, including any imaging ordered for today's clinic.  - Reviewed results and images with patient.   - Medications:          - Discussed pharmacologic options for pain relief.   - May use NSAIDs (Ibuprofen, Naproxen) or Acetaminophen (Tylenol) as needed for pain control.   - Do not take these if previously advised to avoid them for other medical conditions.  - May also use topical medications such as lidocaine, IcyHot,  BioFreeze, or Voltaren gel as needed for pain control.    - Voltaren gel is an anti-inflammatory cream that may be used up to 4 times per day over the painful area.   - Injections:          - Discussed possible injection options and alternatives.    - Injection options include: Intra-articular ankle joint corticosteroid injection  - Performed a corticosteroid injection of the left tibiotalar joint under ultrasound guidance today in clinic. Patient tolerated the procedure well without complications.     - Post-procedure instructions:    - Keep the injection site clean and dry.   - Do not submerge the injection site for 24 hours (no baths, pools). Showers are ok.   - Rest the area for 24-48 hours before resuming normal activities. Avoid overexerting the area for the first few weeks.   - It may take 2-3 days to start noticing the effects of the injection and up to 3-4 weeks to feel significant benefits.   - Therapy:          - Discussed the benefits of therapy vs home exercise program for optimization of range of motion, flexibility, strength, stability and function.   -Has a home exercise program at home from her prior postoperative rehab.  Could consider physical therapy referral in the future as needed.  - Modalities:          - May use ice, heat, massage or other modalities as needed.   - Bracing:          - Discussed bracing options and may consider an ankle brace if she develops more instability or pain does not go away with a injection.  - Surgery:          -At this time, it does not appear that she has a complication with her hardware and I see no surgical indications today.  We will continue with conservative care and evaluate for progress.  - Activity:          - Encouraged to remain active and participate in regular activities as symptoms allow.   Avoid or modify exacerbating activities as needed.  - Follow up:          - As needed for re-evaluation and update to treatment plan.  - May follow up sooner for  new/worsening symptoms.  - May contact clinic by phone or MyChart for questions or concerns.       Hernesto Stevens DO, CAQSM  [facility] - Sports Medicine  [facility] - Department of Orthopedic Surgery       Disclaimer:  This note was prepared and written using Dragon Medical dictation software. As a result, there may be errors in the script that have gone undetected. Please consider this when interpreting the information in this note.

## 2024-12-07 ENCOUNTER — ANCILLARY PROCEDURE (OUTPATIENT)
Dept: GENERAL RADIOLOGY | Facility: CLINIC | Age: 59
End: 2024-12-07
Attending: STUDENT IN AN ORGANIZED HEALTH CARE EDUCATION/TRAINING PROGRAM
Payer: COMMERCIAL

## 2024-12-07 ENCOUNTER — OFFICE VISIT (OUTPATIENT)
Dept: ORTHOPEDICS | Facility: CLINIC | Age: 59
End: 2024-12-07
Payer: COMMERCIAL

## 2024-12-07 DIAGNOSIS — M25.572 LEFT ANKLE PAIN: ICD-10-CM

## 2024-12-07 DIAGNOSIS — M19.172 POST-TRAUMATIC OSTEOARTHRITIS OF LEFT ANKLE: Primary | ICD-10-CM

## 2024-12-07 PROCEDURE — 73610 X-RAY EXAM OF ANKLE: CPT | Mod: LT | Performed by: RADIOLOGY

## 2024-12-07 PROCEDURE — 20606 DRAIN/INJ JOINT/BURSA W/US: CPT | Mod: LT | Performed by: STUDENT IN AN ORGANIZED HEALTH CARE EDUCATION/TRAINING PROGRAM

## 2024-12-07 PROCEDURE — 99214 OFFICE O/P EST MOD 30 MIN: CPT | Mod: 25 | Performed by: STUDENT IN AN ORGANIZED HEALTH CARE EDUCATION/TRAINING PROGRAM

## 2024-12-07 RX ORDER — TRIAMCINOLONE ACETONIDE 40 MG/ML
20 INJECTION, SUSPENSION INTRA-ARTICULAR; INTRAMUSCULAR
Status: COMPLETED | OUTPATIENT
Start: 2024-12-07 | End: 2024-12-07

## 2024-12-07 RX ADMIN — TRIAMCINOLONE ACETONIDE 20 MG: 40 INJECTION, SUSPENSION INTRA-ARTICULAR; INTRAMUSCULAR at 10:38

## 2024-12-07 NOTE — NURSING NOTE
[facility]   ORTHOPEDICS & SPORTS MEDICINE  [address] [place] [address]  Dept: [phone]  ______________________________________________________________________________    Patient: Aishwarya Savage   : 1965   MRN: [id number]   2024    INVASIVE PROCEDURE SAFETY CHECKLIST    Date: 2024   Procedure:USG Left  ankle  injection   Patient Name: Aishwarya Savage  MRN: [id number]  YOB: 1965    Action: Complete sections as appropriate. Any discrepancy results in a HARD COPY until resolved.     PRE PROCEDURE:  Patient ID verified with 2 identifiers (name and  or MRN): Yes  Procedure and site verified with patient/designee (when able): Yes  Accurate consent documentation in medical record: Yes  H&P (or appropriate assessment) documented in medical record: NA  H&P must be up to 20 days prior to procedure and updates within 24 hours of procedure as applicable: NA  Relevant diagnostic and radiology test results appropriately labeled and displayed as applicable: Yes  Procedure site(s) marked with provider initials: Yes    TIMEOUT:  Time-Out performed immediately prior to starting procedure, including verbal and active participation of all team members addressing the following:Yes  * Correct patient identify  * Confirmed that the correct side and site are marked  * An accurate procedure consent form  * Agreement on the procedure to be done  * Correct patient position  * Relevant images and results are properly labeled and appropriately displayed  * The need to administer antibiotics or fluids for irrigation purposes during the procedure as applicable   * Safety precautions based on patient history or medication use    DURING PROCEDURE: Verification of correct person, site, and procedures any time the responsibility for care of the patient is transferred to another member of the care team.       Prior to injection, verified patient identity using patient's name  and date of birth.  Due to injection administration, patient instructed to remain in clinic for 15 minutes  afterwards, and to report any adverse reaction to me immediately.    Joint injection was performed.      Drug Amount Wasted:  Yes: 20 mg/ml   Vial/Syringe: Single dose vial  Expiration Date:  7/30/2026      Fanny Clark, [facility]  December 7, 2024

## 2024-12-07 NOTE — LETTER
2024      Aishwarya Savage  [address] [place] [address]      Dear Colleague,    Thank you for referring your patient, Aishwarya Savage, to the [facility] [place]. Please see a copy of my visit note below.    Aishwarya Savage  :  1965  DOS: 2024  MRN: [id number]  PCP: Vandana Hernandez    Sports Medicine Clinic Visit    HPI  Aishwarya Savage is a 59 year old female who is seen as a self referral presenting with left ankle pain    - Mechanism of Injury:    - No inciting injury  - Pertinent history and prior evaluations:    - None for this new left foot pain. She does a significant history of surgical fixation of the ankle due to a bimalleolar fracture in 2019.    - Pain Character:    - Location: Anterolateral ankle at the tibiotalar joint and ATFL  - Character:  Intermittent sharp, aching. Waxes and wanes with intensity  - Duration:  Started about 2-3 months ago  - Course:  getting worse  - Endorses:    - swelling, numbness, tingling numbness and tingling in the toes occasionally in digits 3-5 when the swelling and pain comes on near the ATFL.  Primarily concerned if her hardware might be loose.  - Denies:    - clicking/popping, mechanical locking symptoms, instability  - Alleviating factors:    -  Nothing  major  - Aggravating factors:    - pivoting, walking, morning, stretching  - Other treatments tried:    - stretching, massage, changing gait    - Patient Goals:    - understand the cause of the symptoms, be able to manage the symptoms successfully, discuss treatment options  - Social History:   - Employed:  Childcare      Review of Systems  Musculoskeletal: as above  Remainder of review of systems is negative including constitutional, CV, pulmonary, GI, Skin and Neurologic except as noted in HPI or medical history.    Past Medical History:   Diagnosis Date     Allergies      HSV (herpes simplex virus) infection     Oral     Neuroma, Harman's      Past  Surgical History:   Procedure Laterality Date     CL AFF SURGICAL PATHOLOGY  01/01/2001     COLONOSCOPY  02/21/2014    Procedure: COLONOSCOPY;  Colonoscopy, screening;  Surgeon: Micheal Webb MD;  Location: [facility] OR     HC TOOTH EXTRACTION W/FORCEP       OPEN REDUCTION INTERNAL FIXATION ANKLE Left 07/12/2019    Procedure: Internal Fixation Left Ankle Medial Lateral Malleolus;  Surgeon: Tin Guzman MD;  Location: [facility] OR     TUBAL LIGATION  01/01/1991     ZZHC EXCISE HAND/FOOT NEUROMA  2009, 2010    Rt Foot     Family History   Problem Relation Age of Onset     Cancer Maternal Grandmother         lung     Cancer Mother         lung     Blood Disease Mother         nonhodgkins lymphoma     Thyroid Disease Mother         hypothyroid     Diabetes Mother         Type 2     Diabetes Father         2     Hypertension Father      C.A.D. Father         ? MI at 69     Cardiovascular Paternal Grandfather         AAA     Breast Cancer Other      Glaucoma Paternal Grandmother      Diabetes Sister         diabetic     Cerebrovascular Disease Maternal Aunt      Macular Degeneration No family hx of          Objective  LMP 02/24/2014 (LMP Unknown)     General: healthy, alert and in no acute distress.    HEENT: no scleral icterus or conjunctival erythema.   Skin: no suspicious lesions or rash. No jaundice.   CV: regular rhythm by palpation, 2+ distal pulses.  Resp: normal respiratory effort without conversational dyspnea.   Psych: normal mood and affect.    Gait:  slightly antalgic favoring the left ankle with a compensated gait, appropriate coordination and balance.    Neuro:       - Slightly diminished sensation of the dorsal lateral foot down to digits 3-5.  Slight worsening numbness with palpation over the distal anterior fibula approximately 3 cm above the joint    MSK - Ankle/Foot:       - Inspection:    -Baseline anterior, medial, lateral ankle swelling since surgery is present today.  No erythema, ecchymosis,  lesion.        - ROM:    - Full AROM/PROM with some discomfort during dorsiflexion and eversion.       - Palpation:    - TTP at the ATFL, anterolateral ankle joint, over a bony spur at the anterolateral talus.   - No warmth  - NTTP elsewhere including over her fixation hardware screws and plate, lateral malleolus.        - Strength:  (*antalgic)   - Dorsiflexion  5   - Plantarflexion 5   - Ext. Misael. Longus 5   - Inversion  5   - Eversion  5        - Special tests:        - Anterior drawer:  Neg   - Talar tilt:  Neg   - Syndesmotic squeeze: Neg   - Kleiger:  Neg   - Soniya:  Neg   - Martin:  Neg   - Metatarsal squeeze:  Neg      Radiology  I independently reviewed the available relevant imaging in the chart, with my interpretations as above in HPI.    I independently reviewed today's new relevant imaging, with the following interpretation:  - XR L ankle 12/7/2024 does show moderate posttraumatic osteoarthritis of the tibiotalar joint.  No acute fractures or dislocations.  No complication or loosening of her hardware.      Procedure  Medium Joint Injection/Arthrocentesis: L ankle    Date/Time: 12/7/2024 10:38 AM    Performed by: Hernesto Stevens DO  Authorized by: Hernesto Stevens DO    Indications:  Pain  Needle Size:  25 G  Guidance: ultrasound    Location:  Ankle  Site:  L ankle  Medications:  20 mg triamcinolone 40 MG/ML  Outcome:  Tolerated well, no immediate complications  Procedure discussed: discussed risks, benefits, and alternatives    Consent Given by:  Patient  Timeout: timeout called immediately prior to procedure    Prep: patient was prepped and draped in usual sterile fashion      Ultrasound-guided intra-articular ankle joint corticosteroid injection - Left  The risks of the procedure were explained to the patient.  A consent was signed for the intra-articular ankle injection.  The patient was evaluated with a FireScope ultrasound machine using a 12 MHz linear probe.     By palpation and ultrasound guidance,  the tibiotalar joint space was identified medial to the tibialis anterior tendon and long axis and marked. Injection site was prepared with chlorhexidine solution in sterile fashion.  A 1.5 inch 25 gauge needle was used to enter the ankle joint via anterior approach under ultrasound guidance and long axis in-plane.  A mixture of 1ml 1% lidocaine, 1ml of 0.5% bupivacaine, and 1ml of kenalog (40mg/ml) was injected without difficulty.  After removal of the needle, the area was cleaned, hemostasis achieved, and bandage applied. There was ultrasound documentation of needle placement and injection.  Patient tolerated the procedure well, no complications.      Assessment  1. Post-traumatic osteoarthritis of left ankle        Plan  Aishwarya Savage is a pleasant 59 year old female that presents with acute on chronic left ankle pain.  She feels pain in the anterolateral ankle that feels like an achy soreness that can get severe at times.  It can be painful after activities or after rest, nighttime pain.  She has a significant history of ORIF in 2019 after a bimalleolar fracture.  Surgery was successful and she does not feel major instability of the ankle.  Strength and range of motion are intact.  She also has some associated numbness/tingling in the dorsal lateral foot down to digits 3-5 intermittently, often will feel it when the ankle swells.  Her radiographs do reveal significant posttraumatic osteoarthritis in the tibiotalar joint and no major disruption of her hardware. History and physical exam appear most consistent with posttraumatic osteoarthritis of the left ankle.     We discussed the nature of the condition and available treatment options, and mutually agreed upon the following plan:    - Imaging:          - Reviewed and independently interpreted the relevant imaging in the chart, including any imaging ordered for today's clinic.  - Reviewed results and images with patient.   - Medications:          - Discussed  pharmacologic options for pain relief.   - May use NSAIDs (Ibuprofen, Naproxen) or Acetaminophen (Tylenol) as needed for pain control.   - Do not take these if previously advised to avoid them for other medical conditions.  - May also use topical medications such as lidocaine, IcyHot, BioFreeze, or Voltaren gel as needed for pain control.    - Voltaren gel is an anti-inflammatory cream that may be used up to 4 times per day over the painful area.   - Injections:          - Discussed possible injection options and alternatives.    - Injection options include: Intra-articular ankle joint corticosteroid injection  - Performed a corticosteroid injection of the left tibiotalar joint under ultrasound guidance today in clinic. Patient tolerated the procedure well without complications.     - Post-procedure instructions:    - Keep the injection site clean and dry.   - Do not submerge the injection site for 24 hours (no baths, pools). Showers are ok.   - Rest the area for 24-48 hours before resuming normal activities. Avoid overexerting the area for the first few weeks.   - It may take 2-3 days to start noticing the effects of the injection and up to 3-4 weeks to feel significant benefits.   - Therapy:          - Discussed the benefits of therapy vs home exercise program for optimization of range of motion, flexibility, strength, stability and function.   -Has a home exercise program at home from her prior postoperative rehab.  Could consider physical therapy referral in the future as needed.  - Modalities:          - May use ice, heat, massage or other modalities as needed.   - Bracing:          - Discussed bracing options and may consider an ankle brace if she develops more instability or pain does not go away with a injection.  - Surgery:          -At this time, it does not appear that she has a complication with her hardware and I see no surgical indications today.  We will continue with conservative care and evaluate  for progress.  - Activity:          - Encouraged to remain active and participate in regular activities as symptoms allow.   Avoid or modify exacerbating activities as needed.  - Follow up:          - As needed for re-evaluation and update to treatment plan.  - May follow up sooner for new/worsening symptoms.  - May contact clinic by phone or MyChart for questions or concerns.       Hernesto Stevens DO, CAQSM  [facility] - Sports Medicine  [facility] - Department of Orthopedic Surgery       Disclaimer:  This note was prepared and written using Dragon Medical dictation software. As a result, there may be errors in the script that have gone undetected. Please consider this when interpreting the information in this note.       Again, thank you for allowing me to participate in the care of your patient.        Sincerely,      Hernesto Stevens DO

## 2025-01-18 ENCOUNTER — NURSE TRIAGE (OUTPATIENT)
Dept: NURSING | Facility: CLINIC | Age: 60
End: 2025-01-18
Payer: COMMERCIAL

## 2025-01-18 NOTE — TELEPHONE ENCOUNTER
"Nurse Triage SBAR    Is this a 2nd Level Triage? NO    Situation: Pt is phoning with abdominal pain - upper abdominal     Background: Pt is phoning with abdominal pain - upper abdominal     Assessment: Pt is phoning with abdominal pain - upper abdominal - started Wednesday night 01/15/2025    Rates abdominal pain 8/10    Denies fever     No vomiting or diarrhea     Pt feels dizzy if she moves to quickly     Pt states that when she stands up straight it makes the pain worse       Protocol Recommended Disposition: Go to ED Now   No disposition on file.    Recommendation: Go to ED Now   - pt states that she will be going to [facility] for evaluation now     Care advice given per protocol and when to call back. Pt verbalized understanding and agrees to plan of care.    Kirsten Bautista RN  [place] Nurse Advisor  12:16 PM 1/18/2025      Reason for Disposition   [1] SEVERE pain (e.g., excruciating) AND [2] present > 1 hour    Additional Information   Negative: Shock suspected (e.g., cold/pale/clammy skin, too weak to stand, low BP, rapid pulse)   Negative: Difficult to awaken or acting confused (e.g., disoriented, slurred speech)   Negative: Passed out (i.e., lost consciousness, collapsed and was not responding)   Negative: Sounds like a life-threatening emergency to the triager   Negative: Followed an abdomen (stomach) injury   Negative: Chest pain   Negative: [1] Abdominal pain AND [2] pregnant < 20 weeks   Negative: [1] Abdominal pain AND [2] pregnant 20 or more weeks   Negative: [1] Abdominal pain AND [2] postpartum (from 0 to 6 weeks after delivery)   Negative: Pain is mainly in upper abdomen  (if needed ask: \"is it mainly above the belly button?\")   Negative: Abdomen bloating or swelling are main symptoms    Protocols used: Abdominal Pain - Female-A-AH    "

## 2025-02-25 DIAGNOSIS — E03.4 HYPOTHYROIDISM DUE TO ACQUIRED ATROPHY OF THYROID: ICD-10-CM

## 2025-02-25 RX ORDER — LEVOTHYROXINE SODIUM 150 UG/1
150 TABLET ORAL DAILY
Qty: 30 TABLET | Refills: 0 | Status: SHIPPED | OUTPATIENT
Start: 2025-02-25

## 2025-02-25 NOTE — TELEPHONE ENCOUNTER
Over a year since last visit. 30 day refill provided. Please schedule a follow up with any available provider. Thank you, Vandana Hernandez MD MPH

## 2025-03-30 ENCOUNTER — HEALTH MAINTENANCE LETTER (OUTPATIENT)
Age: 60
End: 2025-03-30

## 2025-04-07 ENCOUNTER — VIRTUAL VISIT (OUTPATIENT)
Dept: FAMILY MEDICINE | Facility: CLINIC | Age: 60
End: 2025-04-07
Payer: COMMERCIAL

## 2025-04-07 DIAGNOSIS — B37.31 YEAST INFECTION OF THE VAGINA: ICD-10-CM

## 2025-04-07 DIAGNOSIS — F41.1 GAD (GENERALIZED ANXIETY DISORDER): ICD-10-CM

## 2025-04-07 DIAGNOSIS — R53.83 OTHER FATIGUE: ICD-10-CM

## 2025-04-07 DIAGNOSIS — E03.4 HYPOTHYROIDISM DUE TO ACQUIRED ATROPHY OF THYROID: Primary | ICD-10-CM

## 2025-04-07 DIAGNOSIS — B00.9 HSV (HERPES SIMPLEX VIRUS) INFECTION: ICD-10-CM

## 2025-04-07 DIAGNOSIS — R05.3 COUGH, PERSISTENT: ICD-10-CM

## 2025-04-07 DIAGNOSIS — J01.00 ACUTE NON-RECURRENT MAXILLARY SINUSITIS: ICD-10-CM

## 2025-04-07 PROCEDURE — 96127 BRIEF EMOTIONAL/BEHAV ASSMT: CPT | Performed by: PHYSICIAN ASSISTANT

## 2025-04-07 PROCEDURE — 98014 SYNCH AUDIO-ONLY EST MOD 30: CPT | Performed by: PHYSICIAN ASSISTANT

## 2025-04-07 RX ORDER — ACYCLOVIR 50 MG/G
OINTMENT TOPICAL
Qty: 15 G | Refills: 1 | Status: SHIPPED | OUTPATIENT
Start: 2025-04-07

## 2025-04-07 RX ORDER — LEVOTHYROXINE SODIUM 150 UG/1
150 TABLET ORAL DAILY
Qty: 30 TABLET | Refills: 0 | Status: SHIPPED | OUTPATIENT
Start: 2025-04-07

## 2025-04-07 RX ORDER — ALBUTEROL SULFATE 90 UG/1
INHALANT RESPIRATORY (INHALATION)
Qty: 18 G | Refills: 3 | Status: SHIPPED | OUTPATIENT
Start: 2025-04-07

## 2025-04-07 RX ORDER — FLUCONAZOLE 150 MG/1
150 TABLET ORAL ONCE
Qty: 1 TABLET | Refills: 3 | Status: SHIPPED | OUTPATIENT
Start: 2025-04-07 | End: 2025-04-07

## 2025-04-07 ASSESSMENT — PATIENT HEALTH QUESTIONNAIRE - PHQ9: SUM OF ALL RESPONSES TO PHQ QUESTIONS 1-9: 5

## 2025-04-07 NOTE — PROGRESS NOTES
Aishwarya is a 60 year old who is being evaluated via a billable telephone visit.    What phone number would you like to be contacted at? [phone]    How would you like to obtain your AVS? [place]  Originating Location (pt. Location): Home    Distant Location (provider location):  On-site  Telephone visit completed due to the patient did not consent to a video visit.    Assessment & Plan     Hypothyroidism due to acquired atrophy of thyroid  Due for TSH.  With her fatigue we opted to fill for 30 days in case dose adjustment is needed.  She does also have a physical scheduled with her PCP coming up.  - TSH; Future  - levothyroxine (SYNTHROID/LEVOTHROID) 150 MCG tablet; Take 1 tablet (150 mcg) by mouth daily.    Other fatigue  Will also check CBC and ferritin per patient request, may be related to thyroid or environmental.  - CBC with platelets; Future  - Ferritin; Future    HARLEY (generalized anxiety disorder)  Stable on current medication, refilled.  - FLUoxetine (PROZAC) 20 MG capsule; TAKE 1 CAPSULE BY MOUTH EVERY DAY    HSV (herpes simplex virus) infection  No current symptoms, would like refill.  - acyclovir (ZOVIRAX) 5 % external ointment; Apply topically 6 times daily.    Acute non-recurrent maxillary sinusitis  Significant symptoms for 8 days now but having pressure and drainage longer.  Not having improvement and opted to treat with antibiotics.  - amoxicillin-clavulanate (AUGMENTIN) 875-125 MG tablet; Take 1 tablet by mouth 2 times daily for 10 days.    Yeast infection of the vagina  Does get some recurrent issues, typically will use over-the-counter Monistat but does not always seem effective.  Discussed antibiotic associated yeast infections as well and sent fluconazole.  - fluconazole (DIFLUCAN) 150 MG tablet; Take 1 tablet (150 mg) by mouth once for 1 dose.    Cough, persistent  Rare use of albuterol but does like to have on hand, refilled.  - albuterol (PROAIR HFA/PROVENTIL HFA/VENTOLIN HFA) 108 (90  Base) MCG/ACT inhaler; INHALE 1-2 PUFFS INTO THE LUNGS EVERY 6 HRS AS NEEDED FOR SHORTNESS OF BREATH/DYSPNEA/WHEEZING                Pop Neff is a 60 year old, presenting for the following health issues:  MH Follow Up and Thyroid Problem        4/7/2025     9:01 AM   Additional Questions   Roomed by Melly Burgos CMA     HPI      Sinus pain, pressure    Depression and Anxiety   How are you doing with your depression since your last visit? No change  How are you doing with your anxiety since your last visit?  No change  Are you having other symptoms that might be associated with depression or anxiety? No  Have you had a significant life event? No   Do you have any concerns with your use of alcohol or other drugs? No    Social History     Tobacco Use    Smoking status: Never    Smokeless tobacco: Never   Vaping Use    Vaping status: Never Used   Substance Use Topics    Alcohol use: Yes     Alcohol/week: 2.0 standard drinks of alcohol     Types: 2 Standard drinks or equivalent per week     Comment: Socially    Drug use: No         8/30/2023     9:39 AM 2/13/2024     1:04 PM 4/7/2025     9:03 AM   PHQ   PHQ-9 Total Score 2 6 5   Q9: Thoughts of better off dead/self-harm past 2 weeks Not at all Not at all Not at all         5/1/2020     3:29 PM 7/7/2021     7:00 AM 5/26/2022     8:29 AM   HARLEY-7 SCORE   Total Score 0 9 0         Suicide Assessment Five-step Evaluation and Treatment (SAFE-T)      Hypothyroidism Follow-up    Since last visit, patient describes the following symptoms: Weight stable, no hair loss, no skin changes, no constipation, no loose stools            Feels like mood symptoms have been very under control.  Taking fluoxetine, has been on it for several years.  States in the past she has weaned herself off and seems like symptoms seem to flareup.  Would like to continue on this at this time, no adverse effects.    Having fatigue but otherwise denies thyroid symptoms.  Wonders if fatigue could be  related to low iron or due to weather changes.    Thinks she may have a sinus infection.  Often has congestion, pressure and postnasal drip.  About 8 days ago seem like it got very severe, having a lot of pain and pressure in her sinuses.  Symptoms have been ongoing since then, she is able to get some drainage out but still having a lot of pressure.  No fevers.  Using ibuprofen which does take the edge off.  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          Objective           Vitals:  No vitals were obtained today due to virtual visit.    Physical Exam   General: Alert and no distress //Respiratory: No audible wheeze, cough, or shortness of breath // Psychiatric:  Appropriate affect, tone, and pace of words            Phone call duration: 12 minutes  Signed Electronically by: Luisa Polk PA-C

## 2025-05-06 ENCOUNTER — OFFICE VISIT (OUTPATIENT)
Dept: FAMILY MEDICINE | Facility: CLINIC | Age: 60
End: 2025-05-06
Payer: COMMERCIAL

## 2025-05-06 VITALS
BODY MASS INDEX: 31.58 KG/M2 | HEIGHT: 64 IN | SYSTOLIC BLOOD PRESSURE: 121 MMHG | HEART RATE: 79 BPM | RESPIRATION RATE: 16 BRPM | OXYGEN SATURATION: 97 % | DIASTOLIC BLOOD PRESSURE: 82 MMHG | TEMPERATURE: 97.3 F | WEIGHT: 185 LBS

## 2025-05-06 DIAGNOSIS — E03.4 HYPOTHYROIDISM DUE TO ACQUIRED ATROPHY OF THYROID: ICD-10-CM

## 2025-05-06 DIAGNOSIS — Z13.6 SCREENING FOR CARDIOVASCULAR CONDITION: ICD-10-CM

## 2025-05-06 DIAGNOSIS — R53.83 OTHER FATIGUE: ICD-10-CM

## 2025-05-06 DIAGNOSIS — R10.13 DYSPEPSIA: ICD-10-CM

## 2025-05-06 DIAGNOSIS — Z00.00 ROUTINE GENERAL MEDICAL EXAMINATION AT A HEALTH CARE FACILITY: Primary | ICD-10-CM

## 2025-05-06 DIAGNOSIS — F41.1 GAD (GENERALIZED ANXIETY DISORDER): ICD-10-CM

## 2025-05-06 DIAGNOSIS — Z12.31 VISIT FOR SCREENING MAMMOGRAM: ICD-10-CM

## 2025-05-06 DIAGNOSIS — Z23 ENCOUNTER FOR IMMUNIZATION: ICD-10-CM

## 2025-05-06 DIAGNOSIS — R73.03 PRE-DIABETES: ICD-10-CM

## 2025-05-06 DIAGNOSIS — E78.5 HYPERLIPIDEMIA LDL GOAL <160: ICD-10-CM

## 2025-05-06 LAB
BASOPHILS # BLD AUTO: 0 10E3/UL (ref 0–0.2)
BASOPHILS NFR BLD AUTO: 0 %
EOSINOPHIL # BLD AUTO: 0.1 10E3/UL (ref 0–0.7)
EOSINOPHIL NFR BLD AUTO: 1 %
ERYTHROCYTE [DISTWIDTH] IN BLOOD BY AUTOMATED COUNT: 11.7 % (ref 10–15)
EST. AVERAGE GLUCOSE BLD GHB EST-MCNC: 117 MG/DL
HBA1C MFR BLD: 5.7 % (ref 0–5.6)
HCT VFR BLD AUTO: 36.9 % (ref 35–47)
HGB BLD-MCNC: 12.2 G/DL (ref 11.7–15.7)
IMM GRANULOCYTES # BLD: 0 10E3/UL
IMM GRANULOCYTES NFR BLD: 0 %
LYMPHOCYTES # BLD AUTO: 1.1 10E3/UL (ref 0.8–5.3)
LYMPHOCYTES NFR BLD AUTO: 22 %
MCH RBC QN AUTO: 29.2 PG (ref 26.5–33)
MCHC RBC AUTO-ENTMCNC: 33.1 G/DL (ref 31.5–36.5)
MCV RBC AUTO: 88 FL (ref 78–100)
MONOCYTES # BLD AUTO: 0.4 10E3/UL (ref 0–1.3)
MONOCYTES NFR BLD AUTO: 9 %
NEUTROPHILS # BLD AUTO: 3.4 10E3/UL (ref 1.6–8.3)
NEUTROPHILS NFR BLD AUTO: 68 %
PLATELET # BLD AUTO: 216 10E3/UL (ref 150–450)
RBC # BLD AUTO: 4.18 10E6/UL (ref 3.8–5.2)
WBC # BLD AUTO: 4.9 10E3/UL (ref 4–11)

## 2025-05-06 PROCEDURE — 90471 IMMUNIZATION ADMIN: CPT | Performed by: PREVENTIVE MEDICINE

## 2025-05-06 PROCEDURE — 84439 ASSAY OF FREE THYROXINE: CPT | Performed by: PREVENTIVE MEDICINE

## 2025-05-06 PROCEDURE — 1126F AMNT PAIN NOTED NONE PRSNT: CPT | Performed by: PREVENTIVE MEDICINE

## 2025-05-06 PROCEDURE — 84443 ASSAY THYROID STIM HORMONE: CPT | Performed by: PREVENTIVE MEDICINE

## 2025-05-06 PROCEDURE — 82306 VITAMIN D 25 HYDROXY: CPT | Performed by: PREVENTIVE MEDICINE

## 2025-05-06 PROCEDURE — 90677 PCV20 VACCINE IM: CPT | Performed by: PREVENTIVE MEDICINE

## 2025-05-06 PROCEDURE — 80053 COMPREHEN METABOLIC PANEL: CPT | Performed by: PREVENTIVE MEDICINE

## 2025-05-06 PROCEDURE — 99396 PREV VISIT EST AGE 40-64: CPT | Mod: 25 | Performed by: PREVENTIVE MEDICINE

## 2025-05-06 PROCEDURE — 85025 COMPLETE CBC W/AUTO DIFF WBC: CPT | Performed by: PREVENTIVE MEDICINE

## 2025-05-06 PROCEDURE — 82607 VITAMIN B-12: CPT | Performed by: PREVENTIVE MEDICINE

## 2025-05-06 PROCEDURE — 80061 LIPID PANEL: CPT | Performed by: PREVENTIVE MEDICINE

## 2025-05-06 PROCEDURE — 83036 HEMOGLOBIN GLYCOSYLATED A1C: CPT | Performed by: PREVENTIVE MEDICINE

## 2025-05-06 PROCEDURE — 3079F DIAST BP 80-89 MM HG: CPT | Performed by: PREVENTIVE MEDICINE

## 2025-05-06 PROCEDURE — 82728 ASSAY OF FERRITIN: CPT | Performed by: PREVENTIVE MEDICINE

## 2025-05-06 PROCEDURE — 99214 OFFICE O/P EST MOD 30 MIN: CPT | Mod: 25 | Performed by: PREVENTIVE MEDICINE

## 2025-05-06 PROCEDURE — 3074F SYST BP LT 130 MM HG: CPT | Performed by: PREVENTIVE MEDICINE

## 2025-05-06 PROCEDURE — 36415 COLL VENOUS BLD VENIPUNCTURE: CPT | Performed by: PREVENTIVE MEDICINE

## 2025-05-06 RX ORDER — FLUCONAZOLE 150 MG/1
1 TABLET ORAL
COMMUNITY
Start: 2025-04-07 | End: 2025-05-06

## 2025-05-06 RX ORDER — LEVOTHYROXINE SODIUM 150 UG/1
150 TABLET ORAL DAILY
Qty: 90 TABLET | Refills: 3 | Status: SHIPPED | OUTPATIENT
Start: 2025-05-06

## 2025-05-06 RX ORDER — OMEPRAZOLE 20 MG/1
CAPSULE, DELAYED RELEASE ORAL
Qty: 90 CAPSULE | Refills: 1 | Status: SHIPPED | OUTPATIENT
Start: 2025-05-06

## 2025-05-06 SDOH — HEALTH STABILITY: PHYSICAL HEALTH: ON AVERAGE, HOW MANY DAYS PER WEEK DO YOU ENGAGE IN MODERATE TO STRENUOUS EXERCISE (LIKE A BRISK WALK)?: 5 DAYS

## 2025-05-06 SDOH — HEALTH STABILITY: PHYSICAL HEALTH: ON AVERAGE, HOW MANY MINUTES DO YOU ENGAGE IN EXERCISE AT THIS LEVEL?: 20 MIN

## 2025-05-06 ASSESSMENT — SOCIAL DETERMINANTS OF HEALTH (SDOH): HOW OFTEN DO YOU GET TOGETHER WITH FRIENDS OR RELATIVES?: MORE THAN THREE TIMES A WEEK

## 2025-05-06 ASSESSMENT — PAIN SCALES - GENERAL: PAINLEVEL_OUTOF10: NO PAIN (0)

## 2025-05-06 NOTE — PATIENT INSTRUCTIONS
At [facility], we strive to deliver an exceptional experience to you, every time we see you. If you receive a survey, please let us know what we are doing well and/or what we could improve upon, as we do value your feedback.  If you have MyChart, you can expect to receive results automatically within 24 hours of their completion.  Your provider will send a note interpreting your results as well.   If you do not have MyChart, you should receive your results in about a week by mail.    Your care team:                            Family Medicine Internal Medicine   MD Toby Short, MD Michelle Evans, MD Terrell Herbert, MD Shruthi Childress, PA-C    Sea Stephen, MD Pediatrics   Vandana Hernandez, MD Mary Larios, MD Gabrielle Patel, APRN CNP Chrissie SANTA CNP   Sheila Hardy, MD Coreen Stanley, MD Cait Michel, CNP     Nicolette Wilson, PA-C Same-Day Provider (No follow-up visits)   KIET Ricci, STEPHANIA Roper, PA-C    Luisa Polk PA-C     Clinic hours: Monday - Thursday 7 am-6 pm; Fridays 7 am-5 pm.   Urgent care: Monday - Friday 10 am- 8 pm; Saturday and Sunday 9 am-5 pm.    Clinic: [phone]       [facility]: Monday - Thursday 8 am - 7 pm; Friday 8 am - 6 pm  [facility]: [phone]    Patient Education   Preventive Care Advice   This is general advice given by our system to help you stay healthy. However, your care team may have specific advice just for you. Please talk to your care team about your preventive care needs.  Nutrition  Eat 5 or more servings of fruits and vegetables each day.  Try wheat bread, brown rice and whole grain pasta (instead of white bread, rice, and pasta).  Get enough calcium and vitamin D. Check the label on foods and aim for 100% of the RDA (recommended daily allowance).  Lifestyle  Exercise at least 150 minutes each week  (30  minutes a day, 5 days a week).  Do muscle strengthening activities 2 days a week. These help control your weight and prevent disease.  No smoking.  Wear sunscreen to prevent skin cancer.  Have a dental exam and cleaning every 6 months.  Yearly exams  See your health care team every year to talk about:  Any changes in your health.  Any medicines your care team has prescribed.  Preventive care, family planning, and ways to prevent chronic diseases.  Shots (vaccines)   HPV shots (up to age 26), if you've never had them before.  Hepatitis B shots (up to age 59), if you've never had them before.  COVID-19 shot: Get this shot when it's due.  Flu shot: Get a flu shot every year.  Tetanus shot: Get a tetanus shot every 10 years.  Pneumococcal, hepatitis A, and RSV shots: Ask your care team if you need these based on your risk.  Shingles shot (for age 50 and up)  General health tests  Diabetes screening:  Starting at age 35, Get screened for diabetes at least every 3 years.  If you are younger than age 35, ask your care team if you should be screened for diabetes.  Cholesterol test: At age 39, start having a cholesterol test every 5 years, or more often if advised.  Bone density scan (DEXA): At age 50, ask your care team if you should have this scan for osteoporosis (brittle bones).  Hepatitis C: Get tested at least once in your life.  STIs (sexually transmitted infections)  Before age 24: Ask your care team if you should be screened for STIs.  After age 24: Get screened for STIs if you're at risk. You are at risk for STIs (including HIV) if:  You are sexually active with more than one person.  You don't use condoms every time.  You or a partner was diagnosed with a sexually transmitted infection.  If you are at risk for HIV, ask about PrEP medicine to prevent HIV.  Get tested for HIV at least once in your life, whether you are at risk for HIV or not.  Cancer screening tests  Cervical cancer screening: If you have a cervix,  begin getting regular cervical cancer screening tests starting at age 21.  Breast cancer scan (mammogram): If you've ever had breasts, begin having regular mammograms starting at age 40. This is a scan to check for breast cancer.  Colon cancer screening: It is important to start screening for colon cancer at age 45.  Have a colonoscopy test every 10 years (or more often if you're at risk) Or, ask your provider about stool tests like a FIT test every year or Cologuard test every 3 years.  To learn more about your testing options, visit:   .  For help making a decision, visit:   https://bit.ly/rf90515.  Prostate cancer screening test: If you have a prostate, ask your care team if a prostate cancer screening test (PSA) at age 55 is right for you.  Lung cancer screening: If you are a current or former smoker ages 50 to 80, ask your care team if ongoing lung cancer screenings are right for you.  For informational purposes only. Not to replace the advice of your health care provider. Copyright   2023 [facility] [employer]. All rights reserved. Clinically reviewed by the [facility] Transitions Program. [employer] [id number] - REV 01/24.  Learning About Stress  What is stress?     Stress is your body's response to a hard situation. Your body can have a physical, emotional, or mental response. Stress is a fact of life for most people, and it affects everyone differently. What causes stress for you may not be stressful for someone else.  A lot of things can cause stress. You may feel stress when you go on a job interview, take a test, or run a race. This kind of short-term stress is normal and even useful. It can help you if you need to work hard or react quickly. For example, stress can help you finish an important job on time.  Long-term stress is caused by ongoing stressful situations or events. Examples of long-term stress include long-term health problems, ongoing problems at work, or conflicts in your family.  Long-term stress can harm your health.  How does stress affect your health?  When you are stressed, your body responds as though you are in danger. It makes hormones that speed up your heart, make you breathe faster, and give you a burst of energy. This is called the fight-or-flight stress response. If the stress is over quickly, your body goes back to normal and no harm is done.  But if stress happens too often or lasts too long, it can have bad effects. Long-term stress can make you more likely to get sick, and it can make symptoms of some diseases worse. If you tense up when you are stressed, you may develop neck, shoulder, or low back pain. Stress is linked to high blood pressure and heart disease.  Stress also harms your emotional health. It can make you bellamy, tense, or depressed. Your relationships may suffer, and you may not do well at work or school.  What can you do to manage stress?  You can try these things to help manage stress:   Do something active. Exercise or activity can help reduce stress. Walking is a great way to get started. Even everyday activities such as housecleaning or yard work can help.  Try yoga or aarti chi. These techniques combine exercise and meditation. You may need some training at first to learn them.  Do something you enjoy. For example, listen to music or go to a movie. Practice your hobby or do volunteer work.  Meditate. This can help you relax, because you are not worrying about what happened before or what may happen in the future.  Do guided imagery. Imagine yourself in any setting that helps you feel calm. You can use online videos, books, or a teacher to guide you.  Do breathing exercises. For example:  From a standing position, bend forward from the waist with your knees slightly bent. Let your arms dangle close to the floor.  Breathe in slowly and deeply as you return to a standing position. Roll up slowly and lift your head last.  Hold your breath for just a few seconds  "in the standing position.  Breathe out slowly and bend forward from the waist.  Let your feelings out. Talk, laugh, cry, and express anger when you need to. Talking with supportive friends or family, a counselor, or a jacquelyn leader about your feelings is a healthy way to relieve stress. Avoid discussing your feelings with people who make you feel worse.  Write. It may help to write about things that are bothering you. This helps you find out how much stress you feel and what is causing it. When you know this, you can find better ways to cope.  What can you do to prevent stress?  You might try some of these things to help prevent stress:  Manage your time. This helps you find time to do the things you want and need to do.  Get enough sleep. Your body recovers from the stresses of the day while you are sleeping.  Get support. Your family, friends, and community can make a difference in how you experience stress.  Limit your news feed. Avoid or limit time on social media or news that may make you feel stressed.  Do something active. Exercise or activity can help reduce stress. Walking is a great way to get started.  Where can you learn more?  Go to https://www.[employer].net/patiented  Enter N032 in the search box to learn more about \"Learning About Stress.\"  Current as of: October 24, 2024  Content Version: 14.4    [id number] [employer].   Care instructions adapted under license by your healthcare professional. If you have questions about a medical condition or this instruction, always ask your healthcare professional. [employer] disclaims any warranty or liability for your use of this information.       Patient Education   Preventive Care Advice   This is general advice given by our system to help you stay healthy. However, your care team may have specific advice just for you. Please talk to your care team about your preventive care needs.  Nutrition  Eat 5 or more servings of fruits and " vegetables each day.  Try wheat bread, brown rice and whole grain pasta (instead of white bread, rice, and pasta).  Get enough calcium and vitamin D. Check the label on foods and aim for 100% of the RDA (recommended daily allowance).  Lifestyle  Exercise at least 150 minutes each week  (30 minutes a day, 5 days a week).  Do muscle strengthening activities 2 days a week. These help control your weight and prevent disease.  No smoking.  Wear sunscreen to prevent skin cancer.  Have a dental exam and cleaning every 6 months.  Yearly exams  See your health care team every year to talk about:  Any changes in your health.  Any medicines your care team has prescribed.  Preventive care, family planning, and ways to prevent chronic diseases.  Shots (vaccines)   HPV shots (up to age 26), if you've never had them before.  Hepatitis B shots (up to age 59), if you've never had them before.  COVID-19 shot: Get this shot when it's due.  Flu shot: Get a flu shot every year.  Tetanus shot: Get a tetanus shot every 10 years.  Pneumococcal, hepatitis A, and RSV shots: Ask your care team if you need these based on your risk.  Shingles shot (for age 50 and up)  General health tests  Diabetes screening:  Starting at age 35, Get screened for diabetes at least every 3 years.  If you are younger than age 35, ask your care team if you should be screened for diabetes.  Cholesterol test: At age 39, start having a cholesterol test every 5 years, or more often if advised.  Bone density scan (DEXA): At age 50, ask your care team if you should have this scan for osteoporosis (brittle bones).  Hepatitis C: Get tested at least once in your life.  STIs (sexually transmitted infections)  Before age 24: Ask your care team if you should be screened for STIs.  After age 24: Get screened for STIs if you're at risk. You are at risk for STIs (including HIV) if:  You are sexually active with more than one person.  You don't use condoms every time.  You or a  partner was diagnosed with a sexually transmitted infection.  If you are at risk for HIV, ask about PrEP medicine to prevent HIV.  Get tested for HIV at least once in your life, whether you are at risk for HIV or not.  Cancer screening tests  Cervical cancer screening: If you have a cervix, begin getting regular cervical cancer screening tests starting at age 21.  Breast cancer scan (mammogram): If you've ever had breasts, begin having regular mammograms starting at age 40. This is a scan to check for breast cancer.  Colon cancer screening: It is important to start screening for colon cancer at age 45.  Have a colonoscopy test every 10 years (or more often if you're at risk) Or, ask your provider about stool tests like a FIT test every year or Cologuard test every 3 years.  To learn more about your testing options, visit:   .  For help making a decision, visit:   https://bit.ly/wc58297.  Prostate cancer screening test: If you have a prostate, ask your care team if a prostate cancer screening test (PSA) at age 55 is right for you.  Lung cancer screening: If you are a current or former smoker ages 50 to 80, ask your care team if ongoing lung cancer screenings are right for you.  For informational purposes only. Not to replace the advice of your health care provider. Copyright   2023 [facility]. All rights reserved. Clinically reviewed by the [facility] Transitions Program. [employer] [id number] - REV 01/24.  Learning About Stress  What is stress?     Stress is your body's response to a hard situation. Your body can have a physical, emotional, or mental response. Stress is a fact of life for most people, and it affects everyone differently. What causes stress for you may not be stressful for someone else.  A lot of things can cause stress. You may feel stress when you go on a job interview, take a test, or run a race. This kind of short-term stress is normal and even useful. It can help you if you need  to work hard or react quickly. For example, stress can help you finish an important job on time.  Long-term stress is caused by ongoing stressful situations or events. Examples of long-term stress include long-term health problems, ongoing problems at work, or conflicts in your family. Long-term stress can harm your health.  How does stress affect your health?  When you are stressed, your body responds as though you are in danger. It makes hormones that speed up your heart, make you breathe faster, and give you a burst of energy. This is called the fight-or-flight stress response. If the stress is over quickly, your body goes back to normal and no harm is done.  But if stress happens too often or lasts too long, it can have bad effects. Long-term stress can make you more likely to get sick, and it can make symptoms of some diseases worse. If you tense up when you are stressed, you may develop neck, shoulder, or low back pain. Stress is linked to high blood pressure and heart disease.  Stress also harms your emotional health. It can make you bellamy, tense, or depressed. Your relationships may suffer, and you may not do well at work or school.  What can you do to manage stress?  You can try these things to help manage stress:   Do something active. Exercise or activity can help reduce stress. Walking is a great way to get started. Even everyday activities such as housecleaning or yard work can help.  Try yoga or aarti chi. These techniques combine exercise and meditation. You may need some training at first to learn them.  Do something you enjoy. For example, listen to music or go to a movie. Practice your hobby or do volunteer work.  Meditate. This can help you relax, because you are not worrying about what happened before or what may happen in the future.  Do guided imagery. Imagine yourself in any setting that helps you feel calm. You can use online videos, books, or a teacher to guide you.  Do breathing exercises.  "For example:  From a standing position, bend forward from the waist with your knees slightly bent. Let your arms dangle close to the floor.  Breathe in slowly and deeply as you return to a standing position. Roll up slowly and lift your head last.  Hold your breath for just a few seconds in the standing position.  Breathe out slowly and bend forward from the waist.  Let your feelings out. Talk, laugh, cry, and express anger when you need to. Talking with supportive friends or family, a counselor, or a jacquelyn leader about your feelings is a healthy way to relieve stress. Avoid discussing your feelings with people who make you feel worse.  Write. It may help to write about things that are bothering you. This helps you find out how much stress you feel and what is causing it. When you know this, you can find better ways to cope.  What can you do to prevent stress?  You might try some of these things to help prevent stress:  Manage your time. This helps you find time to do the things you want and need to do.  Get enough sleep. Your body recovers from the stresses of the day while you are sleeping.  Get support. Your family, friends, and community can make a difference in how you experience stress.  Limit your news feed. Avoid or limit time on social media or news that may make you feel stressed.  Do something active. Exercise or activity can help reduce stress. Walking is a great way to get started.  Where can you learn more?  Go to https://www.[employer].net/patiented  Enter N032 in the search box to learn more about \"Learning About Stress.\"  Current as of: October 24, 2024  Content Version: 14.4    [id number] [employer].   Care instructions adapted under license by your healthcare professional. If you have questions about a medical condition or this instruction, always ask your healthcare professional. [employer] disclaims any warranty or liability for your use of this information.       "

## 2025-05-06 NOTE — PROGRESS NOTES
"Preventive Care Visit  [facility] ROBERT Hernandez MD, Family Medicine  May 6, 2025      Assessment & Plan     Routine general medical examination at a health care facility  - REVIEW OF HEALTH MAINTENANCE PROTOCOL ORDERS  - PRIMARY CARE FOLLOW-UP SCHEDULING    Screening for cardiovascular condition  -lipid screening     Hyperlipidemia LDL goal <160  -await labs   - Lipid panel reflex to direct LDL Non-fasting    Visit for screening mammogram  - MA Screen Bilateral w/Eric    HARLEY (generalized anxiety disorder)  -stable on Fluoxetine     Hypothyroidism due to acquired atrophy of thyroid  -await labs   - TSH  - T4 free  - levothyroxine (SYNTHROID/LEVOTHROID) 150 MCG tablet  Dispense: 90 tablet; Refill: 3    TSH   Date Value Ref Range Status   02/13/2024 2.62 0.30 - 4.20 uIU/mL Final   01/06/2023 1.15 0.40 - 4.00 mU/L Final   07/05/2021 0.57 0.40 - 4.00 mU/L Final         Other fatigue  -await labs  -mood is controlled  -History of mild sleep apnea which is likely contributing to fatigue, declined sleep clinic follow up  -has a hard time falling asleep, has tried Trazodone, Benadryl without improvement, will try melatonin 3 mg 2 hours before bedtime   - CBC with Platelets & Differential  - Comprehensive metabolic panel  - Ferritin  - Vitamin D Deficiency  - Vitamin B12    Pre-diabetes  -has cut back on sweets   - Hemoglobin A1c    Lab Results   Component Value Date    A1C 5.7 02/13/2024    A1C 5.7 01/06/2023         Encounter for immunization  - Pneumococcal 20 Valent Conjugate (PCV20)    Dyspepsia  -understands long term risk on bone loss, will try and use every other day   - omeprazole (PRILOSEC) 20 MG DR capsule  Dispense: 90 capsule; Refill: 1          BMI  Estimated body mass index is 31.76 kg/m  as calculated from the following:    Height as of this encounter: 1.626 m (5' 4\").    Weight as of this encounter: 83.9 kg (185 lb).   Weight management plan: Discussed healthy diet and exercise " guidelines    Counseling  Appropriate preventive services were addressed with this patient via screening, questionnaire, or discussion as appropriate for fall prevention, nutrition, physical activity, Tobacco-use cessation, social engagement, weight loss and cognition.  Checklist reviewing preventive services available has been given to the patient.  Reviewed patient's diet, addressing concerns and/or questions.   She is at risk for psychosocial distress and has been provided with information to reduce risk.       Follow-up    Follow-up Visit   Expected date:  May 06, 2026 (Approximate)      Follow Up Appointment Details:     Follow-up with whom?: PCP    Follow-Up for what?: Adult Preventive    How?: In Person             Follow-up Visit   Expected date:  May 06, 2026 (Approximate)      Follow Up Appointment Details:     Follow-up with whom?: PCP    Follow-Up for what?: Adult Preventive    How?: In Person                 Pop Neff is a 60 year old, presenting for the following:  Physical        5/6/2025     4:33 PM   Additional Questions   Roomed by usama   Accompanied by self         5/6/2025     4:33 PM   Patient Reported Additional Medications   Patient reports taking the following new medications none        Wt Readings from Last 2 Encounters:   05/06/25 83.9 kg (185 lb)   02/29/24 84.7 kg (186 lb 11.2 oz)        HPI    Patient would like to be tested for thyroid and wondering about Seasonal affective disorder.   Patient has a lot fatigue.       Not taking any pain medication for hip pain     History of mild sleep apnea in the past   has mentioned snoring    Works in      Has been cutting down on candy.    For sleep has tried Benadryl, Trazodone   Would like to try melatonin  Declined sleep clinic referral  No major weight changes     Thyroid:  -Taking medication as prescribed  -no side effects of medication  -no heat or cold intolerance  -no bowel changes      Mood:  -stable  -doing  well with medication  -no medication side effects  -no thoughts of self harm    Advance Care Planning    Health Care Directive received at today's visit.  Forwarded to [employer].        5/6/2025   General Health   How would you rate your overall physical health? Good   Feel stress (tense, anxious, or unable to sleep) To some extent   (!) STRESS CONCERN      5/6/2025   Nutrition   Three or more servings of calcium each day? Yes   Diet: Regular (no restrictions)   How many servings of fruit and vegetables per day? (!) 2-3   How many sweetened beverages each day? 0-1         5/6/2025   Exercise   Days per week of moderate/strenous exercise 5 days   Average minutes spent exercising at this level 20 min         5/6/2025   Social Factors   Frequency of gathering with friends or relatives More than three times a week   Worry food won't last until get money to buy more No   Food not last or not have enough money for food? No   Do you have housing? (Housing is defined as stable permanent housing and does not include staying outside in a car, in a tent, in an abandoned building, in an overnight shelter, or couch-surfing.) Yes   Are you worried about losing your housing? No   Lack of transportation? No   Unable to get utilities (heat,electricity)? No         5/6/2025   Fall Risk   Fallen 2 or more times in the past year? No   Trouble with walking or balance? No          5/6/2025   Dental   Dentist two times every year? Yes         Today's PHQ-9 Score:       4/7/2025     9:03 AM   PHQ-9 SCORE   PHQ-9 Total Score 5           5/6/2025   Substance Use   Alcohol more than 3/day or more than 7/wk Not Applicable   Do you use any other substances recreationally? No     Social History     Tobacco Use    Smoking status: Never    Smokeless tobacco: Never   Vaping Use    Vaping status: Never Used   Substance Use Topics    Alcohol use: Yes     Alcohol/week: 2.0 standard drinks of alcohol     Types: 2 Standard drinks or equivalent  per week     Comment: Socially    Drug use: No           3/2/2024   LAST FHS-7 RESULTS   1st degree relative breast or ovarian cancer No   Any relative bilateral breast cancer No   Any male have breast cancer No   Any ONE woman have BOTH breast AND ovarian cancer No   Any woman with breast cancer before 50yrs Yes   2 or more relatives with breast AND/OR ovarian cancer No   2 or more relatives with breast AND/OR bowel cancer No        Mammogram Screening - Mammogram every 1-2 years updated in Health Maintenance based on mutual decision making        5/6/2025   STI Screening   New sexual partner(s) since last STI/HIV test? No     History of abnormal Pap smear: No - age 30- 64 PAP with HPV every 5 years recommended        Latest Ref Rng & Units 2/13/2024     2:02 PM 12/16/2019     6:41 PM 12/16/2019     7:00 AM   PAP / HPV   PAP  Negative for Intraepithelial Lesion or Malignancy (NILM)      PAP (Historical)   NIL     HPV 16 DNA Negative Negative   Negative    HPV 18 DNA Negative Negative   Negative    Other HR HPV Negative Negative   Negative      ASCVD Risk   The 10-year ASCVD risk score (Galindo PAYNE, et al., 2019) is: 3%    Values used to calculate the score:      Age: 60 years      Sex: Female      Is Non- : No      Diabetic: No      Tobacco smoker: No      Systolic Blood Pressure: 121 mmHg      Is BP treated: No      HDL Cholesterol: 61 mg/dL      Total Cholesterol: 166 mg/dL         Reviewed and updated as needed this visit by Provider   Tobacco  Allergies  Meds  Problems  Med Hx  Surg Hx  Fam Hx            Past Medical History:   Diagnosis Date    Allergies     HSV (herpes simplex virus) infection     Oral    Neuroma, Harman's      Past Surgical History:   Procedure Laterality Date    CL AFF SURGICAL PATHOLOGY  01/01/2001    COLONOSCOPY  02/21/2014    Procedure: COLONOSCOPY;  Colonoscopy, screening;  Surgeon: Micheal Webb MD;  Location:  OR     TOOTH EXTRACTION  W/FORCEP      OPEN REDUCTION INTERNAL FIXATION ANKLE Left 07/12/2019    Procedure: Internal Fixation Left Ankle Medial Lateral Malleolus;  Surgeon: Tin Guzman MD;  Location: [facility] OR    TUBAL LIGATION  01/01/1991    [facility] EXCISE HAND/FOOT NEUROMA  2009, 2010    Rt Foot     Lab work is in process  Labs reviewed in EPIC  BP Readings from Last 3 Encounters:   05/06/25 121/82   02/29/24 (!) 159/90   02/13/24 139/85    Wt Readings from Last 3 Encounters:   05/06/25 83.9 kg (185 lb)   02/29/24 84.7 kg (186 lb 11.2 oz)   02/13/24 85.3 kg (188 lb)                  Patient Active Problem List   Diagnosis    Hypothyroidism    HSV (herpes simplex virus) infection    Depression    Glaucoma suspect    HARLEY (generalized anxiety disorder)    CHEYANNE (obstructive sleep apnea)- mild (AHI 6)    Fibromyalgia    Hyperlipidemia LDL goal <160    Situational mixed anxiety and depressive disorder    Acute seasonal allergic rhinitis due to pollen    Current mild episode of major depressive disorder without prior episode    Closed displaced bimalleolar fracture of left lower leg with routine healing     Past Surgical History:   Procedure Laterality Date    CL AFF SURGICAL PATHOLOGY  01/01/2001    COLONOSCOPY  02/21/2014    Procedure: COLONOSCOPY;  Colonoscopy, screening;  Surgeon: Micheal Webb MD;  Location: [facility] OR    HC TOOTH EXTRACTION W/FORCEP      OPEN REDUCTION INTERNAL FIXATION ANKLE Left 07/12/2019    Procedure: Internal Fixation Left Ankle Medial Lateral Malleolus;  Surgeon: Tin Guzman MD;  Location: [facility] OR    TUBAL LIGATION  01/01/1991    [facility] EXCISE HAND/FOOT NEUROMA  2009, 2010    Rt Foot       Social History     Tobacco Use    Smoking status: Never    Smokeless tobacco: Never   Substance Use Topics    Alcohol use: Yes     Alcohol/week: 2.0 standard drinks of alcohol     Types: 2 Standard drinks or equivalent per week     Comment: Socially     Family History   Problem Relation Age of Onset    Cancer Maternal  "Grandmother         lung    Cancer Mother         lung    Blood Disease Mother         nonhodgkins lymphoma    Thyroid Disease Mother         hypothyroid    Diabetes Mother         Type 2    Diabetes Father         2    Hypertension Father     C.A.D. Father         ? MI at 69    Cardiovascular Paternal Grandfather         AAA    Breast Cancer Other     Glaucoma Paternal Grandmother     Diabetes Sister         diabetic    Cerebrovascular Disease Maternal Aunt     Macular Degeneration No family hx of          Current Outpatient Medications   Medication Sig Dispense Refill    acyclovir (ZOVIRAX) 5 % external ointment Apply topically 6 times daily. 15 g 1    albuterol (PROAIR HFA/PROVENTIL HFA/VENTOLIN HFA) 108 (90 Base) MCG/ACT inhaler INHALE 1-2 PUFFS INTO THE LUNGS EVERY 6 HRS AS NEEDED FOR SHORTNESS OF BREATH/DYSPNEA/WHEEZING 18 g 3    cetirizine (ZYRTEC) 10 MG tablet TAKE 1 TABLET BY MOUTH EVERY DAY 60 tablet 3    FLUoxetine (PROZAC) 20 MG capsule TAKE 1 CAPSULE BY MOUTH EVERY DAY 90 capsule 3    IBUPROFEN 200 MG OR TABS 1 TABLET EVERY 4 TO 6 HOURS AS NEEDED      levothyroxine (SYNTHROID/LEVOTHROID) 150 MCG tablet Take 1 tablet (150 mcg) by mouth daily. 90 tablet 3    Multiple Vitamins-Minerals (MULTIVITAMIN ADULT PO) Take 1 tablet by mouth daily      omeprazole (PRILOSEC) 20 MG DR capsule TAKE 1 CAPSULE BY MOUTH EVERY DAY. 90 capsule 1     Allergies   Allergen Reactions    Clindamycin Swelling     face    Seasonal Allergies     Sulfa Antibiotics Hives         Review of Systems  Constitutional, HEENT, cardiovascular, pulmonary, gi and gu systems are negative, except as otherwise noted.     Objective    Exam  /82 (BP Location: Left arm, Patient Position: Sitting, Cuff Size: Adult Large)   Pulse 79   Temp 97.3  F (36.3  C) (Temporal)   Resp 16   Ht 1.626 m (5' 4\")   Wt 83.9 kg (185 lb)   LMP 02/24/2014 (LMP Unknown)   SpO2 97%   BMI 31.76 kg/m     Estimated body mass index is 31.76 kg/m  as calculated " "from the following:    Height as of this encounter: 1.626 m (5' 4\").    Weight as of this encounter: 83.9 kg (185 lb).    Physical Exam  GENERAL APPEARANCE: healthy, alert and no distress  EYES: Eyes grossly normal to inspection and conjunctivae and sclerae normal  NECK: no adenopathy and trachea midline and normal to palpation  RESP: lungs clear to auscultation - no rales, rhonchi or wheezes  CV: regular rates and rhythm, normal S1 S2, no S3 or S4 and no murmur, click or rub  ABDOMEN: soft, non-tender and no rebound or guarding   MS: extremities normal- no gross deformities noted and peripheral pulses normal  NEURO: Normal strength and tone, mentation intact and speech normal  PSYCH: mentation appears normal          Signed Electronically by: Vandana Hernandez MD    "

## 2025-05-07 ENCOUNTER — RESULTS FOLLOW-UP (OUTPATIENT)
Dept: FAMILY MEDICINE | Facility: CLINIC | Age: 60
End: 2025-05-07

## 2025-05-07 LAB
ALBUMIN SERPL BCG-MCNC: 4.6 G/DL (ref 3.5–5.2)
ALP SERPL-CCNC: 96 U/L (ref 40–150)
ALT SERPL W P-5'-P-CCNC: 27 U/L (ref 0–50)
ANION GAP SERPL CALCULATED.3IONS-SCNC: 11 MMOL/L (ref 7–15)
AST SERPL W P-5'-P-CCNC: 26 U/L (ref 0–45)
BILIRUB SERPL-MCNC: 0.3 MG/DL
BUN SERPL-MCNC: 12.7 MG/DL (ref 8–23)
CALCIUM SERPL-MCNC: 10 MG/DL (ref 8.8–10.4)
CHLORIDE SERPL-SCNC: 105 MMOL/L (ref 98–107)
CHOLEST SERPL-MCNC: 182 MG/DL
CREAT SERPL-MCNC: 0.92 MG/DL (ref 0.51–0.95)
EGFRCR SERPLBLD CKD-EPI 2021: 70 ML/MIN/1.73M2
FASTING STATUS PATIENT QL REPORTED: ABNORMAL
FASTING STATUS PATIENT QL REPORTED: NORMAL
FERRITIN SERPL-MCNC: 106 NG/ML (ref 11–328)
GLUCOSE SERPL-MCNC: 93 MG/DL (ref 70–99)
HCO3 SERPL-SCNC: 24 MMOL/L (ref 22–29)
HDLC SERPL-MCNC: 63 MG/DL
LDLC SERPL CALC-MCNC: 102 MG/DL
NONHDLC SERPL-MCNC: 119 MG/DL
POTASSIUM SERPL-SCNC: 3.9 MMOL/L (ref 3.4–5.3)
PROT SERPL-MCNC: 6.9 G/DL (ref 6.4–8.3)
SODIUM SERPL-SCNC: 140 MMOL/L (ref 135–145)
T4 FREE SERPL-MCNC: 1.2 NG/DL (ref 0.9–1.7)
TRIGL SERPL-MCNC: 85 MG/DL
TSH SERPL DL<=0.005 MIU/L-ACNC: 0.54 UIU/ML (ref 0.3–4.2)
VIT B12 SERPL-MCNC: 689 PG/ML (ref 232–1245)
VIT D+METAB SERPL-MCNC: 28 NG/ML (ref 20–50)

## 2025-05-07 NOTE — RESULT ENCOUNTER NOTE
Aishwarya, your test results were within normal limits.    Cholesterol is at goal for you.  3-month glucose number hemoglobin A1c is still in the prediabetic range at 5.7.    Prediabetes means your blood sugar is high and you are at increased risk for developing overt diabetes in the future.   Be sure to monitor your intake of things like bread, pasta, rice, starchy foods (ie: potatoes), sugary beverages (ie: soda, juice-even the natural kind) and alcohol.  I recommend your plate be 1/2 vegetables, 1/4 protein, and 1/4 carbohydrate.      Vitamin D levels are normal.  Electrolytes, glucose, kidney function, and liver function tests are normal.  Thyroid function is in the correct range.    Iron stores are normal, vitamin B12 is normal.    Blood count is not showing anemia or infection.        Please do not hesitate to call us at [phone] if you have any questions or concerns.    Thank you,    Vandana Hernandez MD MPH

## 2025-06-16 ENCOUNTER — E-VISIT (OUTPATIENT)
Dept: URGENT CARE | Facility: CLINIC | Age: 60
End: 2025-06-16
Payer: COMMERCIAL

## 2025-06-16 ENCOUNTER — OFFICE VISIT (OUTPATIENT)
Dept: URGENT CARE | Facility: URGENT CARE | Age: 60
End: 2025-06-16
Payer: COMMERCIAL

## 2025-06-16 VITALS
SYSTOLIC BLOOD PRESSURE: 135 MMHG | RESPIRATION RATE: 16 BRPM | TEMPERATURE: 98.8 F | DIASTOLIC BLOOD PRESSURE: 81 MMHG | OXYGEN SATURATION: 94 % | HEART RATE: 76 BPM

## 2025-06-16 DIAGNOSIS — L98.9 SKIN LESION: Primary | ICD-10-CM

## 2025-06-16 DIAGNOSIS — R21 RASH AND NONSPECIFIC SKIN ERUPTION: Primary | ICD-10-CM

## 2025-06-16 PROCEDURE — 99207 PR NON-BILLABLE SERV PER CHARTING: CPT | Performed by: EMERGENCY MEDICINE

## 2025-06-16 PROCEDURE — 87070 CULTURE OTHR SPECIMN AEROBIC: CPT | Performed by: PHYSICIAN ASSISTANT

## 2025-06-16 PROCEDURE — 99213 OFFICE O/P EST LOW 20 MIN: CPT | Performed by: PHYSICIAN ASSISTANT

## 2025-06-16 PROCEDURE — 3075F SYST BP GE 130 - 139MM HG: CPT | Performed by: PHYSICIAN ASSISTANT

## 2025-06-16 PROCEDURE — 3079F DIAST BP 80-89 MM HG: CPT | Performed by: PHYSICIAN ASSISTANT

## 2025-06-16 RX ORDER — TRIAMCINOLONE ACETONIDE 1 MG/G
CREAM TOPICAL
Qty: 80 G | Refills: 0 | Status: SHIPPED | OUTPATIENT
Start: 2025-06-16 | End: 2025-06-30

## 2025-06-16 RX ORDER — DOXYCYCLINE 100 MG/1
100 CAPSULE ORAL 2 TIMES DAILY
Qty: 14 CAPSULE | Refills: 0 | Status: SHIPPED | OUTPATIENT
Start: 2025-06-16 | End: 2025-06-23

## 2025-06-16 NOTE — PATIENT INSTRUCTIONS
Dear Aishwarya Savage,    We are sorry you are not feeling well. Based on the responses you provided, it is recommended that you be seen in-person in urgent care so we can better evaluate your symptoms. Please click here to find the nearest urgent care location to you.   You will not be charged for this Visit. Thank you for trusting us with your care.    Andrez Rowell MD

## 2025-06-16 NOTE — PROGRESS NOTES
Urgent Care Clinic Visit    Chief Complaint   Patient presents with    Sore     Sore In lower leg, [place], today with red dots                6/16/2025     5:52 PM   Additional Questions   Roomed by yoel beauchamp   Accompanied by self         6/16/2025   Declines Weight   Did patient decline having their weight taken? Yes

## 2025-06-16 NOTE — PROGRESS NOTES
Assessment & Plan     Skin lesion  - doxycycline hyclate (VIBRAMYCIN) 100 MG capsule; Take 1 capsule (100 mg) by mouth 2 times daily for 7 days.  - triamcinolone (KENALOG) 0.1 % external cream; Apply to affected area twice daily for up to 14 days.  - Wound Aerobic Bacterial Culture Routine Without Gram Stain    Uncertain etiology. Possible contact dermatitis vs staph infection. Will treat for both.     Return in about 1 week (around 6/23/2025) for visit with primary care provider if not improving.     Roz Simon PA-C  [facility]    Subjective   Aishwarya Savage is a 60 year old who presents for the following health issues     Patient presents with:  Sore: Sore In lower leg, [place] maybe [place], today with red dots       HPI    Aishwarya presents for evaluation of a sore on her lower leg.  She first noticed this 2 weeks ago shortly [place]  Maybe [place] by some weeds  The lesion is growing and becoming more red  This area is itchy, not painful    Review of Systems   ROS negative except as stated above.      Objective    /81 (BP Location: Left arm, Patient Position: Sitting, Cuff Size: Adult Regular)   Pulse 76   Temp 98.8  F (37.1  C) (Tympanic)   Resp 16   LMP 02/24/2014 (LMP Unknown)   SpO2 94%   Physical Exam   GENERAL: alert and no distress  SKIN: raised firm erythematous papules on left shin. 2 lesions on right shin. See photos below.    No results found for this or any previous visit (from the past 24 hours).

## 2025-06-18 ENCOUNTER — RESULTS FOLLOW-UP (OUTPATIENT)
Dept: URGENT CARE | Facility: URGENT CARE | Age: 60
End: 2025-06-18

## 2025-06-19 LAB
BACTERIA WND CULT: ABNORMAL
BACTERIA WND CULT: ABNORMAL

## 2025-06-22 LAB
BACTERIA WND CULT: ABNORMAL
BACTERIA WND CULT: ABNORMAL

## (undated) DEVICE — PREP SKIN SCRUB TRAY 4461A

## (undated) DEVICE — PREP DURAPREP REMOVER 4OZ 8611

## (undated) DEVICE — GLOVE PROTEXIS POWDER FREE SMT 7.0  2D72PT70X

## (undated) DEVICE — SUCTION MANIFOLD NEPTUNE 2 SYS 1 PORT 702-025-000

## (undated) DEVICE — GLOVE PROTEXIS BLUE W/NEU-THERA 7.5  2D73EB75

## (undated) DEVICE — PREP DURAPREP 26ML APL 8630

## (undated) DEVICE — ESU ELEC BLADE HEX-LOCKING 2.5" E1450X

## (undated) DEVICE — PREP POVIDONE IODINE SCRUB 7.5% 120ML

## (undated) DEVICE — CAST PADDING 4" STERILE 9044S

## (undated) DEVICE — DRAPE STERI TOWEL LG 1010

## (undated) DEVICE — DRILL BIT QUICK COUPLING 2.5X110MM GOLD 310.25

## (undated) DEVICE — ESU GROUND PAD ADULT W/CORD E7507

## (undated) DEVICE — GLOVE PROTEXIS W/NEU-THERA 7.0  2D73TE70

## (undated) DEVICE — SOL NACL 0.9% IRRIG 1000ML BOTTLE 2F7124

## (undated) DEVICE — DRAPE CONVERTORS U-DRAPE 60X72" 8476

## (undated) DEVICE — DRILL BIT QUICK COUPLING 2.0MM 310.201

## (undated) DEVICE — PACK LOWER EXTREMITY CUSTOM ASC

## (undated) DEVICE — LINEN ORTHO PACK 5446

## (undated) DEVICE — SU MONOCRYL 4-0 PS-2 18" UND Y496G

## (undated) DEVICE — SOL WATER IRRIG 1000ML BOTTLE 2F7114

## (undated) DEVICE — IMPLANTABLE DEVICE
Type: IMPLANTABLE DEVICE | Site: ANKLE | Status: NON-FUNCTIONAL
Removed: 2019-07-12

## (undated) RX ORDER — GABAPENTIN 300 MG/1
CAPSULE ORAL
Status: DISPENSED
Start: 2019-07-12

## (undated) RX ORDER — FENTANYL CITRATE 50 UG/ML
INJECTION, SOLUTION INTRAMUSCULAR; INTRAVENOUS
Status: DISPENSED
Start: 2019-07-12

## (undated) RX ORDER — BUPIVACAINE HYDROCHLORIDE 2.5 MG/ML
INJECTION, SOLUTION EPIDURAL; INFILTRATION; INTRACAUDAL
Status: DISPENSED
Start: 2019-07-12

## (undated) RX ORDER — CEFAZOLIN SODIUM 2 G/50ML
SOLUTION INTRAVENOUS
Status: DISPENSED
Start: 2019-07-12

## (undated) RX ORDER — ACETAMINOPHEN 325 MG/1
TABLET ORAL
Status: DISPENSED
Start: 2019-07-12